# Patient Record
Sex: FEMALE | Race: WHITE | Employment: OTHER | URBAN - METROPOLITAN AREA
[De-identification: names, ages, dates, MRNs, and addresses within clinical notes are randomized per-mention and may not be internally consistent; named-entity substitution may affect disease eponyms.]

---

## 2018-03-12 ENCOUNTER — TELEPHONE (OUTPATIENT)
Dept: FAMILY MEDICINE CLINIC | Age: 83
End: 2018-03-12

## 2018-03-12 NOTE — TELEPHONE ENCOUNTER
LSW called patient regarding transportation issues. Patient reported she ended up asking a friend who is taking off work to take her to appt on 3/15/18 at [employer]. Patient stated [facility] had advised her to call back on 20th to request ride for 4/5/18 appt with Dr. Whitley Maguire and 4/20/18 with Dr. Roshan Rendon. LSW advised patient if unable to get [facility] transportation to call the offices where her appointments are and ask the staff to request Pauly Mnotero transportation with [employer]. LSW advised patient to call LSW with any future transportation issues and as needed.

## 2018-03-15 ENCOUNTER — HOSPITAL ENCOUNTER (OUTPATIENT)
Dept: CARDIOLOGY | Age: 83
Discharge: HOME | End: 2018-03-15
Payer: MEDICARE

## 2018-03-15 LAB
LV EF: 65 %
LVEF MODALITY: NORMAL

## 2018-03-15 PROCEDURE — 93306 TTE W/DOPPLER COMPLETE: CPT

## 2018-04-05 ENCOUNTER — TELEPHONE (OUTPATIENT)
Dept: FAMILY MEDICINE CLINIC | Age: 83
End: 2018-04-05

## 2018-04-05 ENCOUNTER — OFFICE VISIT (OUTPATIENT)
Dept: NON INVASIVE DIAGNOSTICS | Age: 83
End: 2018-04-05
Payer: MEDICARE

## 2018-04-05 VITALS
HEART RATE: 80 BPM | HEIGHT: 64 IN | SYSTOLIC BLOOD PRESSURE: 128 MMHG | BODY MASS INDEX: 30.56 KG/M2 | WEIGHT: 179 LBS | DIASTOLIC BLOOD PRESSURE: 60 MMHG | RESPIRATION RATE: 20 BRPM

## 2018-04-05 DIAGNOSIS — I44.2 COMPLETE HEART BLOCK (HCC): ICD-10-CM

## 2018-04-05 DIAGNOSIS — Z95.0 PACEMAKER: Primary | ICD-10-CM

## 2018-04-05 PROCEDURE — 99212 OFFICE O/P EST SF 10 MIN: CPT | Performed by: INTERNAL MEDICINE

## 2018-04-05 PROCEDURE — 1123F ACP DISCUSS/DSCN MKR DOCD: CPT | Performed by: INTERNAL MEDICINE

## 2018-04-05 PROCEDURE — 1090F PRES/ABSN URINE INCON ASSESS: CPT | Performed by: INTERNAL MEDICINE

## 2018-04-05 PROCEDURE — G8427 DOCREV CUR MEDS BY ELIG CLIN: HCPCS | Performed by: INTERNAL MEDICINE

## 2018-04-05 PROCEDURE — 93280 PM DEVICE PROGR EVAL DUAL: CPT | Performed by: INTERNAL MEDICINE

## 2018-04-05 PROCEDURE — 1036F TOBACCO NON-USER: CPT | Performed by: INTERNAL MEDICINE

## 2018-04-05 PROCEDURE — 4040F PNEUMOC VAC/ADMIN/RCVD: CPT | Performed by: INTERNAL MEDICINE

## 2018-04-05 PROCEDURE — G8417 CALC BMI ABV UP PARAM F/U: HCPCS | Performed by: INTERNAL MEDICINE

## 2018-04-05 PROCEDURE — G8399 PT W/DXA RESULTS DOCUMENT: HCPCS | Performed by: INTERNAL MEDICINE

## 2018-04-13 ENCOUNTER — HOSPITAL ENCOUNTER (OUTPATIENT)
Age: 83
Discharge: HOME | End: 2018-04-15
Payer: MEDICARE

## 2018-04-13 ENCOUNTER — OFFICE VISIT (OUTPATIENT)
Dept: FAMILY MEDICINE CLINIC | Age: 83
End: 2018-04-13
Payer: MEDICARE

## 2018-04-13 VITALS
TEMPERATURE: 97.6 F | HEART RATE: 83 BPM | SYSTOLIC BLOOD PRESSURE: 124 MMHG | WEIGHT: 171 LBS | BODY MASS INDEX: 29.19 KG/M2 | OXYGEN SATURATION: 96 % | RESPIRATION RATE: 18 BRPM | HEIGHT: 64 IN | DIASTOLIC BLOOD PRESSURE: 68 MMHG

## 2018-04-13 DIAGNOSIS — E11.22 TYPE 2 DIABETES MELLITUS WITH STAGE 3 CHRONIC KIDNEY DISEASE, WITHOUT LONG-TERM CURRENT USE OF INSULIN (HCC): ICD-10-CM

## 2018-04-13 DIAGNOSIS — N18.30 TYPE 2 DIABETES MELLITUS WITH STAGE 3 CHRONIC KIDNEY DISEASE, WITHOUT LONG-TERM CURRENT USE OF INSULIN (HCC): ICD-10-CM

## 2018-04-13 DIAGNOSIS — E03.9 HYPOTHYROIDISM, UNSPECIFIED TYPE: ICD-10-CM

## 2018-04-13 DIAGNOSIS — M79.89 LEG SWELLING: ICD-10-CM

## 2018-04-13 DIAGNOSIS — I10 ESSENTIAL HYPERTENSION: Chronic | ICD-10-CM

## 2018-04-13 DIAGNOSIS — M79.89 LEG SWELLING: Primary | ICD-10-CM

## 2018-04-13 LAB
ALBUMIN SERPL-MCNC: 4 G/DL (ref 3.5–5.2)
ALP BLD-CCNC: 79 U/L (ref 35–104)
ALT SERPL-CCNC: 11 U/L (ref 0–32)
ANION GAP SERPL CALCULATED.3IONS-SCNC: 21 MMOL/L (ref 7–16)
AST SERPL-CCNC: 25 U/L (ref 0–31)
BASOPHILS ABSOLUTE: 0.05 E9/L (ref 0–0.2)
BASOPHILS RELATIVE PERCENT: 0.6 % (ref 0–2)
BILIRUB SERPL-MCNC: 0.5 MG/DL (ref 0–1.2)
BUN BLDV-MCNC: 23 MG/DL (ref 8–23)
CALCIUM SERPL-MCNC: 10.1 MG/DL (ref 8.6–10.2)
CHLORIDE BLD-SCNC: 96 MMOL/L (ref 98–107)
CO2: 23 MMOL/L (ref 22–29)
CREAT SERPL-MCNC: 1 MG/DL (ref 0.5–1)
EOSINOPHILS ABSOLUTE: 0.2 E9/L (ref 0.05–0.5)
EOSINOPHILS RELATIVE PERCENT: 2.2 % (ref 0–6)
GFR AFRICAN AMERICAN: >60
GFR NON-AFRICAN AMERICAN: 53 ML/MIN/1.73
GLUCOSE BLD-MCNC: 70 MG/DL (ref 74–109)
HBA1C MFR BLD: 6.1 % (ref 4.8–5.9)
HCT VFR BLD CALC: 44.6 % (ref 34–48)
HEMOGLOBIN: 14 G/DL (ref 11.5–15.5)
IMMATURE GRANULOCYTES #: 0.02 E9/L
IMMATURE GRANULOCYTES %: 0.2 % (ref 0–5)
LYMPHOCYTES ABSOLUTE: 2.94 E9/L (ref 1.5–4)
LYMPHOCYTES RELATIVE PERCENT: 32.9 % (ref 20–42)
MAGNESIUM: 2.5 MG/DL (ref 1.6–2.6)
MCH RBC QN AUTO: 28.7 PG (ref 26–35)
MCHC RBC AUTO-ENTMCNC: 31.4 % (ref 32–34.5)
MCV RBC AUTO: 91.6 FL (ref 80–99.9)
MONOCYTES ABSOLUTE: 1.38 E9/L (ref 0.1–0.95)
MONOCYTES RELATIVE PERCENT: 15.4 % (ref 2–12)
NEUTROPHILS ABSOLUTE: 4.35 E9/L (ref 1.8–7.3)
NEUTROPHILS RELATIVE PERCENT: 48.7 % (ref 43–80)
PDW BLD-RTO: 15.8 FL (ref 11.5–15)
PLATELET # BLD: 408 E9/L (ref 130–450)
PMV BLD AUTO: 10.5 FL (ref 7–12)
POTASSIUM SERPL-SCNC: 4.5 MMOL/L (ref 3.5–5)
RBC # BLD: 4.87 E12/L (ref 3.5–5.5)
SODIUM BLD-SCNC: 140 MMOL/L (ref 132–146)
TOTAL PROTEIN: 8.3 G/DL (ref 6.4–8.3)
TSH SERPL DL<=0.05 MIU/L-ACNC: 1.39 UIU/ML (ref 0.27–4.2)
WBC # BLD: 8.9 E9/L (ref 4.5–11.5)

## 2018-04-13 PROCEDURE — 4040F PNEUMOC VAC/ADMIN/RCVD: CPT | Performed by: FAMILY MEDICINE

## 2018-04-13 PROCEDURE — 36415 COLL VENOUS BLD VENIPUNCTURE: CPT | Performed by: FAMILY MEDICINE

## 2018-04-13 PROCEDURE — 83735 ASSAY OF MAGNESIUM: CPT

## 2018-04-13 PROCEDURE — G8417 CALC BMI ABV UP PARAM F/U: HCPCS | Performed by: FAMILY MEDICINE

## 2018-04-13 PROCEDURE — 84443 ASSAY THYROID STIM HORMONE: CPT

## 2018-04-13 PROCEDURE — G8399 PT W/DXA RESULTS DOCUMENT: HCPCS | Performed by: FAMILY MEDICINE

## 2018-04-13 PROCEDURE — 1123F ACP DISCUSS/DSCN MKR DOCD: CPT | Performed by: FAMILY MEDICINE

## 2018-04-13 PROCEDURE — 1090F PRES/ABSN URINE INCON ASSESS: CPT | Performed by: FAMILY MEDICINE

## 2018-04-13 PROCEDURE — 99214 OFFICE O/P EST MOD 30 MIN: CPT | Performed by: FAMILY MEDICINE

## 2018-04-13 PROCEDURE — 85025 COMPLETE CBC W/AUTO DIFF WBC: CPT

## 2018-04-13 PROCEDURE — 1036F TOBACCO NON-USER: CPT | Performed by: FAMILY MEDICINE

## 2018-04-13 PROCEDURE — 83036 HEMOGLOBIN GLYCOSYLATED A1C: CPT

## 2018-04-13 PROCEDURE — G8427 DOCREV CUR MEDS BY ELIG CLIN: HCPCS | Performed by: FAMILY MEDICINE

## 2018-04-13 PROCEDURE — 80053 COMPREHEN METABOLIC PANEL: CPT

## 2018-04-13 RX ORDER — FUROSEMIDE 20 MG/1
20 TABLET ORAL DAILY
Qty: 60 TABLET | Refills: 1 | Status: CANCELLED | OUTPATIENT
Start: 2018-04-13

## 2018-04-13 RX ORDER — CHLORTHALIDONE 25 MG/1
25 TABLET ORAL DAILY
Qty: 30 TABLET | Refills: 0 | Status: SHIPPED | OUTPATIENT
Start: 2018-04-13 | End: 2018-05-25 | Stop reason: SDUPTHER

## 2018-04-14 ASSESSMENT — ENCOUNTER SYMPTOMS
NAUSEA: 0
HEARTBURN: 0
WHEEZING: 0
COUGH: 0

## 2018-04-27 ENCOUNTER — OFFICE VISIT (OUTPATIENT)
Dept: FAMILY MEDICINE CLINIC | Age: 83
End: 2018-04-27
Payer: MEDICARE

## 2018-04-27 ENCOUNTER — HOSPITAL ENCOUNTER (OUTPATIENT)
Age: 83
Discharge: HOME | End: 2018-04-29
Payer: MEDICARE

## 2018-04-27 VITALS
HEART RATE: 76 BPM | BODY MASS INDEX: 29.53 KG/M2 | HEIGHT: 64 IN | OXYGEN SATURATION: 97 % | WEIGHT: 173 LBS | DIASTOLIC BLOOD PRESSURE: 64 MMHG | RESPIRATION RATE: 16 BRPM | SYSTOLIC BLOOD PRESSURE: 112 MMHG | TEMPERATURE: 97.6 F

## 2018-04-27 DIAGNOSIS — I10 ESSENTIAL HYPERTENSION: Chronic | ICD-10-CM

## 2018-04-27 DIAGNOSIS — I10 ESSENTIAL HYPERTENSION: Primary | Chronic | ICD-10-CM

## 2018-04-27 DIAGNOSIS — R53.1 GENERALIZED WEAKNESS: ICD-10-CM

## 2018-04-27 DIAGNOSIS — Z76.0 MEDICATION REFILL: ICD-10-CM

## 2018-04-27 DIAGNOSIS — E11.9 DIET-CONTROLLED DIABETES MELLITUS (HCC): ICD-10-CM

## 2018-04-27 PROCEDURE — 1123F ACP DISCUSS/DSCN MKR DOCD: CPT | Performed by: FAMILY MEDICINE

## 2018-04-27 PROCEDURE — G8399 PT W/DXA RESULTS DOCUMENT: HCPCS | Performed by: FAMILY MEDICINE

## 2018-04-27 PROCEDURE — 80053 COMPREHEN METABOLIC PANEL: CPT

## 2018-04-27 PROCEDURE — 4040F PNEUMOC VAC/ADMIN/RCVD: CPT | Performed by: FAMILY MEDICINE

## 2018-04-27 PROCEDURE — 1036F TOBACCO NON-USER: CPT | Performed by: FAMILY MEDICINE

## 2018-04-27 PROCEDURE — G8427 DOCREV CUR MEDS BY ELIG CLIN: HCPCS | Performed by: FAMILY MEDICINE

## 2018-04-27 PROCEDURE — 1090F PRES/ABSN URINE INCON ASSESS: CPT | Performed by: FAMILY MEDICINE

## 2018-04-27 PROCEDURE — G8417 CALC BMI ABV UP PARAM F/U: HCPCS | Performed by: FAMILY MEDICINE

## 2018-04-27 PROCEDURE — 99213 OFFICE O/P EST LOW 20 MIN: CPT | Performed by: FAMILY MEDICINE

## 2018-04-27 RX ORDER — AMLODIPINE BESYLATE 10 MG/1
5 TABLET ORAL EVERY EVENING
Status: SHIPPED | COMMUNITY
Start: 2018-04-27 | End: 2018-06-08 | Stop reason: SDUPTHER

## 2018-04-27 RX ORDER — ISOSORBIDE MONONITRATE 30 MG/1
30 TABLET, EXTENDED RELEASE ORAL DAILY
Qty: 90 TABLET | Refills: 0 | Status: SHIPPED | OUTPATIENT
Start: 2018-04-27 | End: 2018-07-23 | Stop reason: SDUPTHER

## 2018-04-27 ASSESSMENT — ENCOUNTER SYMPTOMS
BACK PAIN: 1
COUGH: 0
WHEEZING: 0

## 2018-04-28 LAB
ALBUMIN SERPL-MCNC: 3.9 G/DL (ref 3.5–5.2)
ALP BLD-CCNC: 79 U/L (ref 35–104)
ALT SERPL-CCNC: 16 U/L (ref 0–32)
ANION GAP SERPL CALCULATED.3IONS-SCNC: 18 MMOL/L (ref 7–16)
AST SERPL-CCNC: 25 U/L (ref 0–31)
BILIRUB SERPL-MCNC: 0.4 MG/DL (ref 0–1.2)
BUN BLDV-MCNC: 27 MG/DL (ref 8–23)
CALCIUM SERPL-MCNC: 9.8 MG/DL (ref 8.6–10.2)
CHLORIDE BLD-SCNC: 87 MMOL/L (ref 98–107)
CO2: 28 MMOL/L (ref 22–29)
CREAT SERPL-MCNC: 1 MG/DL (ref 0.5–1)
GFR AFRICAN AMERICAN: >60
GFR NON-AFRICAN AMERICAN: 53 ML/MIN/1.73
GLUCOSE BLD-MCNC: 90 MG/DL (ref 74–109)
POTASSIUM SERPL-SCNC: 3.8 MMOL/L (ref 3.5–5)
SODIUM BLD-SCNC: 133 MMOL/L (ref 132–146)
TOTAL PROTEIN: 8.3 G/DL (ref 6.4–8.3)

## 2018-05-11 RX ORDER — OXYBUTYNIN CHLORIDE 5 MG/1
5 TABLET ORAL NIGHTLY
Qty: 30 TABLET | Refills: 2 | Status: SHIPPED | OUTPATIENT
Start: 2018-05-11 | End: 2018-06-13 | Stop reason: SDUPTHER

## 2018-05-20 ENCOUNTER — HOSPITAL ENCOUNTER (OUTPATIENT)
Age: 83
Setting detail: OBSERVATION
Discharge: HOME | End: 2018-05-21
Attending: EMERGENCY MEDICINE | Admitting: INTERNAL MEDICINE
Payer: MEDICARE

## 2018-05-20 ENCOUNTER — APPOINTMENT (OUTPATIENT)
Dept: GENERAL RADIOLOGY | Age: 83
End: 2018-05-20
Payer: MEDICARE

## 2018-05-20 DIAGNOSIS — M79.602 LEFT ARM PAIN: Primary | ICD-10-CM

## 2018-05-20 LAB
ALBUMIN SERPL-MCNC: 3.8 G/DL (ref 3.5–5.2)
ALP BLD-CCNC: 83 U/L (ref 35–104)
ALT SERPL-CCNC: 16 U/L (ref 0–32)
ANION GAP SERPL CALCULATED.3IONS-SCNC: 15 MMOL/L (ref 7–16)
APTT: 37.8 SEC (ref 24.5–35.1)
AST SERPL-CCNC: 23 U/L (ref 0–31)
BACTERIA: ABNORMAL /HPF
BASOPHILS ABSOLUTE: 0.06 E9/L (ref 0–0.2)
BASOPHILS RELATIVE PERCENT: 0.8 % (ref 0–2)
BILIRUB SERPL-MCNC: 0.5 MG/DL (ref 0–1.2)
BILIRUBIN URINE: NEGATIVE
BLOOD, URINE: NEGATIVE
BUN BLDV-MCNC: 17 MG/DL (ref 8–23)
CALCIUM SERPL-MCNC: 9.4 MG/DL (ref 8.6–10.2)
CHLORIDE BLD-SCNC: 92 MMOL/L (ref 98–107)
CLARITY: CLEAR
CO2: 27 MMOL/L (ref 22–29)
COLOR: YELLOW
CREAT SERPL-MCNC: 0.8 MG/DL (ref 0.5–1)
EKG ATRIAL RATE: 75 BPM
EKG P AXIS: 39 DEGREES
EKG P-R INTERVAL: 184 MS
EKG Q-T INTERVAL: 462 MS
EKG QRS DURATION: 162 MS
EKG QTC CALCULATION (BAZETT): 515 MS
EKG R AXIS: -82 DEGREES
EKG T AXIS: 74 DEGREES
EKG VENTRICULAR RATE: 75 BPM
EOSINOPHILS ABSOLUTE: 0.11 E9/L (ref 0.05–0.5)
EOSINOPHILS RELATIVE PERCENT: 1.5 % (ref 0–6)
GFR AFRICAN AMERICAN: >60
GFR NON-AFRICAN AMERICAN: >60 ML/MIN/1.73
GLUCOSE BLD-MCNC: 94 MG/DL (ref 74–109)
GLUCOSE URINE: NEGATIVE MG/DL
HCT VFR BLD CALC: 41.2 % (ref 34–48)
HEMOGLOBIN: 13.8 G/DL (ref 11.5–15.5)
IMMATURE GRANULOCYTES #: 0.02 E9/L
IMMATURE GRANULOCYTES %: 0.3 % (ref 0–5)
INR BLD: 1.1
KETONES, URINE: NEGATIVE MG/DL
LEUKOCYTE ESTERASE, URINE: ABNORMAL
LYMPHOCYTES ABSOLUTE: 2.53 E9/L (ref 1.5–4)
LYMPHOCYTES RELATIVE PERCENT: 35.5 % (ref 20–42)
MCH RBC QN AUTO: 29 PG (ref 26–35)
MCHC RBC AUTO-ENTMCNC: 33.5 % (ref 32–34.5)
MCV RBC AUTO: 86.6 FL (ref 80–99.9)
MONOCYTES ABSOLUTE: 0.96 E9/L (ref 0.1–0.95)
MONOCYTES RELATIVE PERCENT: 13.5 % (ref 2–12)
NEUTROPHILS ABSOLUTE: 3.44 E9/L (ref 1.8–7.3)
NEUTROPHILS RELATIVE PERCENT: 48.4 % (ref 43–80)
NITRITE, URINE: NEGATIVE
PDW BLD-RTO: 14.1 FL (ref 11.5–15)
PH UA: 7.5 (ref 5–9)
PLATELET # BLD: 344 E9/L (ref 130–450)
PMV BLD AUTO: 9 FL (ref 7–12)
POTASSIUM SERPL-SCNC: 4 MMOL/L (ref 3.5–5)
PROTEIN UA: NEGATIVE MG/DL
PROTHROMBIN TIME: 12.1 SEC (ref 9.3–12.4)
RBC # BLD: 4.76 E12/L (ref 3.5–5.5)
RBC UA: ABNORMAL /HPF (ref 0–2)
SODIUM BLD-SCNC: 134 MMOL/L (ref 132–146)
SPECIFIC GRAVITY UA: <=1.005 (ref 1–1.03)
TOTAL CK: 84 U/L (ref 20–180)
TOTAL PROTEIN: 7.8 G/DL (ref 6.4–8.3)
TROPONIN: <0.01 NG/ML (ref 0–0.03)
UROBILINOGEN, URINE: 0.2 E.U./DL
WBC # BLD: 7.1 E9/L (ref 4.5–11.5)
WBC UA: ABNORMAL /HPF (ref 0–5)

## 2018-05-20 PROCEDURE — 2580000003 HC RX 258: Performed by: INTERNAL MEDICINE

## 2018-05-20 PROCEDURE — 6360000002 HC RX W HCPCS: Performed by: INTERNAL MEDICINE

## 2018-05-20 PROCEDURE — 93005 ELECTROCARDIOGRAM TRACING: CPT | Performed by: EMERGENCY MEDICINE

## 2018-05-20 PROCEDURE — 85025 COMPLETE CBC W/AUTO DIFF WBC: CPT

## 2018-05-20 PROCEDURE — 99285 EMERGENCY DEPT VISIT HI MDM: CPT

## 2018-05-20 PROCEDURE — 85730 THROMBOPLASTIN TIME PARTIAL: CPT

## 2018-05-20 PROCEDURE — 80053 COMPREHEN METABOLIC PANEL: CPT

## 2018-05-20 PROCEDURE — 81001 URINALYSIS AUTO W/SCOPE: CPT

## 2018-05-20 PROCEDURE — 85610 PROTHROMBIN TIME: CPT

## 2018-05-20 PROCEDURE — G0378 HOSPITAL OBSERVATION PER HR: HCPCS

## 2018-05-20 PROCEDURE — 36415 COLL VENOUS BLD VENIPUNCTURE: CPT

## 2018-05-20 PROCEDURE — 71045 X-RAY EXAM CHEST 1 VIEW: CPT

## 2018-05-20 PROCEDURE — 6370000000 HC RX 637 (ALT 250 FOR IP): Performed by: INTERNAL MEDICINE

## 2018-05-20 PROCEDURE — 84484 ASSAY OF TROPONIN QUANT: CPT

## 2018-05-20 PROCEDURE — 96372 THER/PROPH/DIAG INJ SC/IM: CPT

## 2018-05-20 PROCEDURE — 82550 ASSAY OF CK (CPK): CPT

## 2018-05-20 RX ORDER — ONDANSETRON 2 MG/ML
4 INJECTION INTRAMUSCULAR; INTRAVENOUS EVERY 6 HOURS PRN
Status: DISCONTINUED | OUTPATIENT
Start: 2018-05-20 | End: 2018-05-21 | Stop reason: HOSPADM

## 2018-05-20 RX ORDER — ISOSORBIDE MONONITRATE 30 MG/1
30 TABLET, EXTENDED RELEASE ORAL DAILY
Status: DISCONTINUED | OUTPATIENT
Start: 2018-05-21 | End: 2018-05-21 | Stop reason: HOSPADM

## 2018-05-20 RX ORDER — CHOLECALCIFEROL (VITAMIN D3) 50 MCG
2000 TABLET ORAL DAILY
Status: DISCONTINUED | OUTPATIENT
Start: 2018-05-20 | End: 2018-05-21 | Stop reason: HOSPADM

## 2018-05-20 RX ORDER — AMLODIPINE BESYLATE 5 MG/1
5 TABLET ORAL EVERY EVENING
Status: DISCONTINUED | OUTPATIENT
Start: 2018-05-20 | End: 2018-05-21 | Stop reason: HOSPADM

## 2018-05-20 RX ORDER — CHLORTHALIDONE 25 MG/1
25 TABLET ORAL DAILY
Status: DISCONTINUED | OUTPATIENT
Start: 2018-05-20 | End: 2018-05-21 | Stop reason: HOSPADM

## 2018-05-20 RX ORDER — SIMVASTATIN 10 MG
10 TABLET ORAL NIGHTLY
Status: DISCONTINUED | OUTPATIENT
Start: 2018-05-20 | End: 2018-05-21 | Stop reason: HOSPADM

## 2018-05-20 RX ORDER — SODIUM CHLORIDE 0.9 % (FLUSH) 0.9 %
10 SYRINGE (ML) INJECTION EVERY 12 HOURS SCHEDULED
Status: DISCONTINUED | OUTPATIENT
Start: 2018-05-20 | End: 2018-05-21 | Stop reason: HOSPADM

## 2018-05-20 RX ORDER — LEVOTHYROXINE SODIUM 0.1 MG/1
100 TABLET ORAL DAILY
Status: DISCONTINUED | OUTPATIENT
Start: 2018-05-20 | End: 2018-05-21 | Stop reason: HOSPADM

## 2018-05-20 RX ORDER — NITROGLYCERIN 0.4 MG/1
0.4 TABLET SUBLINGUAL EVERY 5 MIN PRN
Status: DISCONTINUED | OUTPATIENT
Start: 2018-05-20 | End: 2018-05-21 | Stop reason: HOSPADM

## 2018-05-20 RX ORDER — SODIUM CHLORIDE 0.9 % (FLUSH) 0.9 %
10 SYRINGE (ML) INJECTION PRN
Status: DISCONTINUED | OUTPATIENT
Start: 2018-05-20 | End: 2018-05-21 | Stop reason: HOSPADM

## 2018-05-20 RX ORDER — OXYBUTYNIN CHLORIDE 5 MG/1
5 TABLET ORAL NIGHTLY
Status: DISCONTINUED | OUTPATIENT
Start: 2018-05-20 | End: 2018-05-21 | Stop reason: HOSPADM

## 2018-05-20 RX ORDER — FOLIC ACID 1 MG/1
500 TABLET ORAL DAILY
Status: DISCONTINUED | OUTPATIENT
Start: 2018-05-20 | End: 2018-05-21 | Stop reason: HOSPADM

## 2018-05-20 RX ORDER — ACETAMINOPHEN 325 MG/1
650 TABLET ORAL EVERY 4 HOURS PRN
Status: DISCONTINUED | OUTPATIENT
Start: 2018-05-20 | End: 2018-05-21 | Stop reason: HOSPADM

## 2018-05-20 RX ORDER — LANOLIN ALCOHOL/MO/W.PET/CERES
1000 CREAM (GRAM) TOPICAL DAILY
Status: DISCONTINUED | OUTPATIENT
Start: 2018-05-20 | End: 2018-05-21 | Stop reason: HOSPADM

## 2018-05-20 RX ORDER — ASPIRIN 81 MG/1
81 TABLET ORAL DAILY
Status: DISCONTINUED | OUTPATIENT
Start: 2018-05-20 | End: 2018-05-21 | Stop reason: HOSPADM

## 2018-05-20 RX ADMIN — Medication 10 ML: at 20:46

## 2018-05-20 RX ADMIN — OXYBUTYNIN CHLORIDE 5 MG: 5 TABLET ORAL at 20:45

## 2018-05-20 RX ADMIN — CYANOCOBALAMIN TAB 1000 MCG 1000 MCG: 1000 TAB at 19:00

## 2018-05-20 RX ADMIN — CHOLECALCIFEROL TAB 50 MCG (2000 UNIT) 2000 UNITS: 50 TAB at 19:00

## 2018-05-20 RX ADMIN — FOLIC ACID 500 MCG: 1 TABLET ORAL at 20:45

## 2018-05-20 RX ADMIN — ASPIRIN 81 MG: 81 TABLET ORAL at 19:00

## 2018-05-20 RX ADMIN — SIMVASTATIN 10 MG: 10 TABLET, FILM COATED ORAL at 20:44

## 2018-05-20 RX ADMIN — AMLODIPINE BESYLATE 5 MG: 5 TABLET ORAL at 20:45

## 2018-05-20 RX ADMIN — ENOXAPARIN SODIUM 40 MG: 40 INJECTION SUBCUTANEOUS at 19:00

## 2018-05-20 ASSESSMENT — PAIN SCALES - GENERAL: PAINLEVEL_OUTOF10: 0

## 2018-05-21 ENCOUNTER — APPOINTMENT (OUTPATIENT)
Dept: NUCLEAR MEDICINE | Age: 83
End: 2018-05-21
Payer: MEDICARE

## 2018-05-21 VITALS
OXYGEN SATURATION: 95 % | WEIGHT: 171 LBS | HEART RATE: 82 BPM | HEIGHT: 64 IN | TEMPERATURE: 97.6 F | DIASTOLIC BLOOD PRESSURE: 79 MMHG | SYSTOLIC BLOOD PRESSURE: 159 MMHG | BODY MASS INDEX: 29.19 KG/M2 | RESPIRATION RATE: 18 BRPM

## 2018-05-21 PROBLEM — R07.9 CHEST PAIN: Status: RESOLVED | Noted: 2018-05-21 | Resolved: 2018-05-21

## 2018-05-21 PROBLEM — E03.9 ACQUIRED HYPOTHYROIDISM: Chronic | Status: ACTIVE | Noted: 2018-05-21

## 2018-05-21 PROBLEM — R07.9 CHEST PAIN: Status: ACTIVE | Noted: 2018-05-21

## 2018-05-21 PROBLEM — E78.5 HYPERLIPIDEMIA LDL GOAL <100: Chronic | Status: ACTIVE | Noted: 2018-05-21

## 2018-05-21 PROBLEM — M79.602 LEFT ARM PAIN: Status: RESOLVED | Noted: 2018-05-20 | Resolved: 2018-05-21

## 2018-05-21 LAB
APTT: 36 SEC (ref 24.5–35.1)
BASOPHILS ABSOLUTE: 0.06 E9/L (ref 0–0.2)
BASOPHILS RELATIVE PERCENT: 0.7 % (ref 0–2)
CHOLESTEROL, TOTAL: 124 MG/DL (ref 0–199)
EOSINOPHILS ABSOLUTE: 0.21 E9/L (ref 0.05–0.5)
EOSINOPHILS RELATIVE PERCENT: 2.5 % (ref 0–6)
HCT VFR BLD CALC: 36.6 % (ref 34–48)
HDLC SERPL-MCNC: 49 MG/DL
HEMOGLOBIN: 12.3 G/DL (ref 11.5–15.5)
IMMATURE GRANULOCYTES #: 0.02 E9/L
IMMATURE GRANULOCYTES %: 0.2 % (ref 0–5)
INR BLD: 1.1
LDL CHOLESTEROL CALCULATED: 60 MG/DL (ref 0–99)
LV EF: 71 %
LVEF MODALITY: NORMAL
LYMPHOCYTES ABSOLUTE: 3.64 E9/L (ref 1.5–4)
LYMPHOCYTES RELATIVE PERCENT: 43.5 % (ref 20–42)
MCH RBC QN AUTO: 28.9 PG (ref 26–35)
MCHC RBC AUTO-ENTMCNC: 33.6 % (ref 32–34.5)
MCV RBC AUTO: 85.9 FL (ref 80–99.9)
MONOCYTES ABSOLUTE: 1.01 E9/L (ref 0.1–0.95)
MONOCYTES RELATIVE PERCENT: 12.1 % (ref 2–12)
NEUTROPHILS ABSOLUTE: 3.42 E9/L (ref 1.8–7.3)
NEUTROPHILS RELATIVE PERCENT: 41 % (ref 43–80)
PDW BLD-RTO: 13.9 FL (ref 11.5–15)
PLATELET # BLD: 334 E9/L (ref 130–450)
PMV BLD AUTO: 9.5 FL (ref 7–12)
PROTHROMBIN TIME: 13 SEC (ref 9.3–12.4)
RBC # BLD: 4.26 E12/L (ref 3.5–5.5)
TRIGL SERPL-MCNC: 76 MG/DL (ref 0–149)
VLDLC SERPL CALC-MCNC: 15 MG/DL
WBC # BLD: 8.4 E9/L (ref 4.5–11.5)

## 2018-05-21 PROCEDURE — 6360000002 HC RX W HCPCS: Performed by: INTERNAL MEDICINE

## 2018-05-21 PROCEDURE — 99214 OFFICE O/P EST MOD 30 MIN: CPT | Performed by: INTERNAL MEDICINE

## 2018-05-21 PROCEDURE — 93017 CV STRESS TEST TRACING ONLY: CPT

## 2018-05-21 PROCEDURE — G0378 HOSPITAL OBSERVATION PER HR: HCPCS

## 2018-05-21 PROCEDURE — 36415 COLL VENOUS BLD VENIPUNCTURE: CPT

## 2018-05-21 PROCEDURE — 6370000000 HC RX 637 (ALT 250 FOR IP): Performed by: INTERNAL MEDICINE

## 2018-05-21 PROCEDURE — 2580000003 HC RX 258: Performed by: INTERNAL MEDICINE

## 2018-05-21 PROCEDURE — APPSS60 APP SPLIT SHARED TIME 46-60 MINUTES: Performed by: NURSE PRACTITIONER

## 2018-05-21 PROCEDURE — 85025 COMPLETE CBC W/AUTO DIFF WBC: CPT

## 2018-05-21 PROCEDURE — 80061 LIPID PANEL: CPT

## 2018-05-21 PROCEDURE — 3430000000 HC RX DIAGNOSTIC RADIOPHARMACEUTICAL: Performed by: RADIOLOGY

## 2018-05-21 PROCEDURE — A9500 TC99M SESTAMIBI: HCPCS | Performed by: RADIOLOGY

## 2018-05-21 PROCEDURE — 85730 THROMBOPLASTIN TIME PARTIAL: CPT

## 2018-05-21 PROCEDURE — 78452 HT MUSCLE IMAGE SPECT MULT: CPT

## 2018-05-21 PROCEDURE — 85610 PROTHROMBIN TIME: CPT

## 2018-05-21 RX ORDER — SODIUM CHLORIDE 0.9 % (FLUSH) 0.9 %
10 SYRINGE (ML) INJECTION PRN
Status: DISCONTINUED | OUTPATIENT
Start: 2018-05-21 | End: 2018-05-21 | Stop reason: SDUPTHER

## 2018-05-21 RX ADMIN — Medication 10 ML: at 09:09

## 2018-05-21 RX ADMIN — Medication 32.4 MILLICURIE: at 10:20

## 2018-05-21 RX ADMIN — ISOSORBIDE MONONITRATE 30 MG: 30 TABLET ORAL at 14:15

## 2018-05-21 RX ADMIN — CHLORTHALIDONE 25 MG: 25 TABLET ORAL at 14:15

## 2018-05-21 RX ADMIN — REGADENOSON 0.4 MG: 0.08 INJECTION, SOLUTION INTRAVENOUS at 11:21

## 2018-05-21 RX ADMIN — Medication 10 MILLICURIE: at 10:32

## 2018-05-21 RX ADMIN — ASPIRIN 81 MG: 81 TABLET ORAL at 14:15

## 2018-05-21 ASSESSMENT — PAIN SCALES - GENERAL
PAINLEVEL_OUTOF10: 0

## 2018-05-25 ENCOUNTER — TELEPHONE (OUTPATIENT)
Dept: FAMILY MEDICINE CLINIC | Age: 83
End: 2018-05-25

## 2018-05-25 DIAGNOSIS — M79.89 LEG SWELLING: ICD-10-CM

## 2018-05-25 RX ORDER — CHLORTHALIDONE 25 MG/1
25 TABLET ORAL DAILY
Qty: 30 TABLET | Refills: 2 | Status: SHIPPED | OUTPATIENT
Start: 2018-05-25 | End: 2018-09-28 | Stop reason: SDUPTHER

## 2018-05-25 NOTE — TELEPHONE ENCOUNTER
LSW received phone call from patient, reported pharmacy wanted to know if patient needed a refill of chlorthalidone 25mg. Patient reportedly tried calling PCP office but automated message said please allow 48 hours for prescription refills. LSW will call office. Also discussed patient possibly being enrolled in [employer] [facility] if there are anymore vouchers for patients not receiving food assistance/SNAP. Patient unsure if niece will be able to go to [employer] for her. LSW will send niece copy of program and will check with  regarding any available vouchers. Patient stated she is very pleased with change in PCP and the office staff.

## 2018-06-08 ENCOUNTER — OFFICE VISIT (OUTPATIENT)
Dept: FAMILY MEDICINE CLINIC | Age: 83
End: 2018-06-08
Payer: MEDICARE

## 2018-06-08 VITALS
RESPIRATION RATE: 16 BRPM | OXYGEN SATURATION: 99 % | BODY MASS INDEX: 30.56 KG/M2 | DIASTOLIC BLOOD PRESSURE: 60 MMHG | TEMPERATURE: 98.2 F | SYSTOLIC BLOOD PRESSURE: 112 MMHG | HEIGHT: 64 IN | WEIGHT: 179 LBS | HEART RATE: 79 BPM

## 2018-06-08 DIAGNOSIS — Z76.0 MEDICATION REFILL: ICD-10-CM

## 2018-06-08 DIAGNOSIS — R07.9 CHEST PAIN, UNSPECIFIED TYPE: ICD-10-CM

## 2018-06-08 DIAGNOSIS — M25.512 LEFT SHOULDER PAIN, UNSPECIFIED CHRONICITY: ICD-10-CM

## 2018-06-08 PROCEDURE — 1111F DSCHRG MED/CURRENT MED MERGE: CPT | Performed by: FAMILY MEDICINE

## 2018-06-08 PROCEDURE — 4040F PNEUMOC VAC/ADMIN/RCVD: CPT | Performed by: FAMILY MEDICINE

## 2018-06-08 PROCEDURE — 1036F TOBACCO NON-USER: CPT | Performed by: FAMILY MEDICINE

## 2018-06-08 PROCEDURE — 1090F PRES/ABSN URINE INCON ASSESS: CPT | Performed by: FAMILY MEDICINE

## 2018-06-08 PROCEDURE — G8427 DOCREV CUR MEDS BY ELIG CLIN: HCPCS | Performed by: FAMILY MEDICINE

## 2018-06-08 PROCEDURE — G8399 PT W/DXA RESULTS DOCUMENT: HCPCS | Performed by: FAMILY MEDICINE

## 2018-06-08 PROCEDURE — G8417 CALC BMI ABV UP PARAM F/U: HCPCS | Performed by: FAMILY MEDICINE

## 2018-06-08 PROCEDURE — 1123F ACP DISCUSS/DSCN MKR DOCD: CPT | Performed by: FAMILY MEDICINE

## 2018-06-08 PROCEDURE — 99214 OFFICE O/P EST MOD 30 MIN: CPT | Performed by: FAMILY MEDICINE

## 2018-06-08 RX ORDER — SIMVASTATIN 10 MG
10 TABLET ORAL NIGHTLY
Qty: 30 TABLET | Refills: 3 | Status: SHIPPED | OUTPATIENT
Start: 2018-06-08 | End: 2018-09-28 | Stop reason: SDUPTHER

## 2018-06-08 RX ORDER — AMLODIPINE BESYLATE 5 MG/1
5 TABLET ORAL EVERY EVENING
Qty: 90 TABLET | Refills: 0 | Status: SHIPPED | OUTPATIENT
Start: 2018-06-08 | End: 2018-09-28 | Stop reason: SDUPTHER

## 2018-06-14 RX ORDER — OXYBUTYNIN CHLORIDE 5 MG/1
5 TABLET ORAL NIGHTLY
Qty: 90 TABLET | Refills: 1 | Status: SHIPPED | OUTPATIENT
Start: 2018-06-14 | End: 2018-09-28 | Stop reason: SDUPTHER

## 2018-06-30 ENCOUNTER — APPOINTMENT (OUTPATIENT)
Dept: CT IMAGING | Age: 83
End: 2018-06-30
Payer: MEDICARE

## 2018-06-30 ENCOUNTER — APPOINTMENT (OUTPATIENT)
Dept: GENERAL RADIOLOGY | Age: 83
End: 2018-06-30
Payer: MEDICARE

## 2018-06-30 ENCOUNTER — HOSPITAL ENCOUNTER (OUTPATIENT)
Age: 83
Setting detail: OBSERVATION
Discharge: NURSING, REHAB OR LONG-TERM CARE | End: 2018-07-03
Attending: EMERGENCY MEDICINE | Admitting: INTERNAL MEDICINE
Payer: MEDICARE

## 2018-06-30 DIAGNOSIS — R56.9 SEIZURE-LIKE ACTIVITY (HCC): ICD-10-CM

## 2018-06-30 DIAGNOSIS — R55 SYNCOPE AND COLLAPSE: Primary | ICD-10-CM

## 2018-06-30 PROBLEM — E87.1 HYPONATREMIA: Status: ACTIVE | Noted: 2018-06-30

## 2018-06-30 LAB
ALBUMIN SERPL-MCNC: 3.9 G/DL (ref 3.5–5.2)
ALP BLD-CCNC: 82 U/L (ref 35–104)
ALT SERPL-CCNC: 19 U/L (ref 0–32)
AMORPHOUS: ABNORMAL
ANION GAP SERPL CALCULATED.3IONS-SCNC: 12 MMOL/L (ref 7–16)
AST SERPL-CCNC: 31 U/L (ref 0–31)
BACTERIA: ABNORMAL /HPF
BASOPHILS ABSOLUTE: 0.04 E9/L (ref 0–0.2)
BASOPHILS RELATIVE PERCENT: 0.5 % (ref 0–2)
BILIRUB SERPL-MCNC: 0.5 MG/DL (ref 0–1.2)
BILIRUBIN URINE: NEGATIVE
BLOOD, URINE: NEGATIVE
BUN BLDV-MCNC: 18 MG/DL (ref 8–23)
CALCIUM SERPL-MCNC: 9.9 MG/DL (ref 8.6–10.2)
CHLORIDE BLD-SCNC: 86 MMOL/L (ref 98–107)
CHP ED QC CHECK: YES
CLARITY: ABNORMAL
CO2: 28 MMOL/L (ref 22–29)
COLOR: YELLOW
CREAT SERPL-MCNC: 0.9 MG/DL (ref 0.5–1)
EKG ATRIAL RATE: 78 BPM
EKG P AXIS: 49 DEGREES
EKG P-R INTERVAL: 180 MS
EKG Q-T INTERVAL: 460 MS
EKG QRS DURATION: 164 MS
EKG QTC CALCULATION (BAZETT): 524 MS
EKG R AXIS: -83 DEGREES
EKG T AXIS: 79 DEGREES
EKG VENTRICULAR RATE: 78 BPM
EOSINOPHILS ABSOLUTE: 0.14 E9/L (ref 0.05–0.5)
EOSINOPHILS RELATIVE PERCENT: 1.9 % (ref 0–6)
GFR AFRICAN AMERICAN: >60
GFR NON-AFRICAN AMERICAN: 59 ML/MIN/1.73
GLUCOSE BLD-MCNC: 100 MG/DL (ref 74–109)
GLUCOSE BLD-MCNC: 91 MG/DL
GLUCOSE URINE: NEGATIVE MG/DL
HCT VFR BLD CALC: 38.8 % (ref 34–48)
HEMOGLOBIN: 13.6 G/DL (ref 11.5–15.5)
IMMATURE GRANULOCYTES #: 0.03 E9/L
IMMATURE GRANULOCYTES %: 0.4 % (ref 0–5)
KETONES, URINE: NEGATIVE MG/DL
LACTIC ACID: 1.1 MMOL/L (ref 0.5–2.2)
LEUKOCYTE ESTERASE, URINE: ABNORMAL
LYMPHOCYTES ABSOLUTE: 1.98 E9/L (ref 1.5–4)
LYMPHOCYTES RELATIVE PERCENT: 26.5 % (ref 20–42)
MCH RBC QN AUTO: 29.6 PG (ref 26–35)
MCHC RBC AUTO-ENTMCNC: 35.1 % (ref 32–34.5)
MCV RBC AUTO: 84.3 FL (ref 80–99.9)
METER GLUCOSE: 126 MG/DL (ref 70–110)
METER GLUCOSE: 141 MG/DL (ref 70–110)
METER GLUCOSE: 91 MG/DL (ref 70–110)
MONOCYTES ABSOLUTE: 1.08 E9/L (ref 0.1–0.95)
MONOCYTES RELATIVE PERCENT: 14.5 % (ref 2–12)
NEUTROPHILS ABSOLUTE: 4.19 E9/L (ref 1.8–7.3)
NEUTROPHILS RELATIVE PERCENT: 56.2 % (ref 43–80)
NITRITE, URINE: NEGATIVE
PDW BLD-RTO: 14 FL (ref 11.5–15)
PH UA: 7.5 (ref 5–9)
PLATELET # BLD: 354 E9/L (ref 130–450)
PMV BLD AUTO: 9.6 FL (ref 7–12)
POTASSIUM SERPL-SCNC: 3.6 MMOL/L (ref 3.5–5)
PRO-BNP: 1035 PG/ML (ref 0–450)
PROLACTIN: 34.14 NG/ML
PROTEIN UA: NEGATIVE MG/DL
RBC # BLD: 4.6 E12/L (ref 3.5–5.5)
RBC UA: ABNORMAL /HPF (ref 0–2)
RENAL EPITHELIAL, UA: ABNORMAL /HPF
SODIUM BLD-SCNC: 126 MMOL/L (ref 132–146)
SPECIFIC GRAVITY UA: 1.01 (ref 1–1.03)
TOTAL PROTEIN: 8.1 G/DL (ref 6.4–8.3)
TROPONIN: <0.01 NG/ML (ref 0–0.03)
TROPONIN: <0.01 NG/ML (ref 0–0.03)
UROBILINOGEN, URINE: 0.2 E.U./DL
WBC # BLD: 7.5 E9/L (ref 4.5–11.5)
WBC UA: ABNORMAL /HPF (ref 0–5)

## 2018-06-30 PROCEDURE — 81001 URINALYSIS AUTO W/SCOPE: CPT

## 2018-06-30 PROCEDURE — 85025 COMPLETE CBC W/AUTO DIFF WBC: CPT

## 2018-06-30 PROCEDURE — 6360000002 HC RX W HCPCS: Performed by: INTERNAL MEDICINE

## 2018-06-30 PROCEDURE — 71045 X-RAY EXAM CHEST 1 VIEW: CPT

## 2018-06-30 PROCEDURE — 80053 COMPREHEN METABOLIC PANEL: CPT

## 2018-06-30 PROCEDURE — G0378 HOSPITAL OBSERVATION PER HR: HCPCS

## 2018-06-30 PROCEDURE — 70450 CT HEAD/BRAIN W/O DYE: CPT

## 2018-06-30 PROCEDURE — 36415 COLL VENOUS BLD VENIPUNCTURE: CPT

## 2018-06-30 PROCEDURE — 84146 ASSAY OF PROLACTIN: CPT

## 2018-06-30 PROCEDURE — 93005 ELECTROCARDIOGRAM TRACING: CPT | Performed by: EMERGENCY MEDICINE

## 2018-06-30 PROCEDURE — 2580000003 HC RX 258: Performed by: EMERGENCY MEDICINE

## 2018-06-30 PROCEDURE — 2580000003 HC RX 258: Performed by: INTERNAL MEDICINE

## 2018-06-30 PROCEDURE — 82962 GLUCOSE BLOOD TEST: CPT

## 2018-06-30 PROCEDURE — 96372 THER/PROPH/DIAG INJ SC/IM: CPT

## 2018-06-30 PROCEDURE — 84484 ASSAY OF TROPONIN QUANT: CPT

## 2018-06-30 PROCEDURE — 6370000000 HC RX 637 (ALT 250 FOR IP): Performed by: INTERNAL MEDICINE

## 2018-06-30 PROCEDURE — 99285 EMERGENCY DEPT VISIT HI MDM: CPT

## 2018-06-30 PROCEDURE — 83880 ASSAY OF NATRIURETIC PEPTIDE: CPT

## 2018-06-30 PROCEDURE — 83605 ASSAY OF LACTIC ACID: CPT

## 2018-06-30 PROCEDURE — 87088 URINE BACTERIA CULTURE: CPT

## 2018-06-30 RX ORDER — SODIUM CHLORIDE 0.9 % (FLUSH) 0.9 %
10 SYRINGE (ML) INJECTION EVERY 12 HOURS SCHEDULED
Status: DISCONTINUED | OUTPATIENT
Start: 2018-06-30 | End: 2018-07-03 | Stop reason: HOSPADM

## 2018-06-30 RX ORDER — ISOSORBIDE MONONITRATE 30 MG/1
30 TABLET, EXTENDED RELEASE ORAL DAILY
Status: DISCONTINUED | OUTPATIENT
Start: 2018-07-01 | End: 2018-07-03 | Stop reason: HOSPADM

## 2018-06-30 RX ORDER — FOLIC ACID 1 MG/1
500 TABLET ORAL DAILY
Status: DISCONTINUED | OUTPATIENT
Start: 2018-07-01 | End: 2018-07-03 | Stop reason: HOSPADM

## 2018-06-30 RX ORDER — 0.9 % SODIUM CHLORIDE 0.9 %
500 INTRAVENOUS SOLUTION INTRAVENOUS ONCE
Status: COMPLETED | OUTPATIENT
Start: 2018-06-30 | End: 2018-06-30

## 2018-06-30 RX ORDER — OXYBUTYNIN CHLORIDE 5 MG/1
5 TABLET ORAL NIGHTLY
Status: DISCONTINUED | OUTPATIENT
Start: 2018-06-30 | End: 2018-07-03 | Stop reason: HOSPADM

## 2018-06-30 RX ORDER — NICOTINE POLACRILEX 4 MG
15 LOZENGE BUCCAL PRN
Status: DISCONTINUED | OUTPATIENT
Start: 2018-06-30 | End: 2018-07-03 | Stop reason: HOSPADM

## 2018-06-30 RX ORDER — SIMVASTATIN 10 MG
10 TABLET ORAL NIGHTLY
Status: DISCONTINUED | OUTPATIENT
Start: 2018-06-30 | End: 2018-07-03 | Stop reason: HOSPADM

## 2018-06-30 RX ORDER — DEXTROSE MONOHYDRATE 25 G/50ML
12.5 INJECTION, SOLUTION INTRAVENOUS PRN
Status: DISCONTINUED | OUTPATIENT
Start: 2018-06-30 | End: 2018-07-03 | Stop reason: HOSPADM

## 2018-06-30 RX ORDER — AMLODIPINE BESYLATE 5 MG/1
5 TABLET ORAL EVERY EVENING
Status: DISCONTINUED | OUTPATIENT
Start: 2018-06-30 | End: 2018-07-03 | Stop reason: HOSPADM

## 2018-06-30 RX ORDER — ACETAMINOPHEN 325 MG/1
650 TABLET ORAL EVERY 4 HOURS PRN
Status: DISCONTINUED | OUTPATIENT
Start: 2018-06-30 | End: 2018-07-03 | Stop reason: HOSPADM

## 2018-06-30 RX ORDER — SODIUM CHLORIDE 9 MG/ML
INJECTION, SOLUTION INTRAVENOUS CONTINUOUS
Status: DISCONTINUED | OUTPATIENT
Start: 2018-06-30 | End: 2018-07-03

## 2018-06-30 RX ORDER — LEVOTHYROXINE SODIUM 0.1 MG/1
100 TABLET ORAL DAILY
Status: DISCONTINUED | OUTPATIENT
Start: 2018-07-01 | End: 2018-07-03 | Stop reason: HOSPADM

## 2018-06-30 RX ORDER — DEXTROSE MONOHYDRATE 50 MG/ML
100 INJECTION, SOLUTION INTRAVENOUS PRN
Status: DISCONTINUED | OUTPATIENT
Start: 2018-06-30 | End: 2018-07-03 | Stop reason: HOSPADM

## 2018-06-30 RX ORDER — ONDANSETRON 2 MG/ML
4 INJECTION INTRAMUSCULAR; INTRAVENOUS EVERY 6 HOURS PRN
Status: DISCONTINUED | OUTPATIENT
Start: 2018-06-30 | End: 2018-07-03 | Stop reason: HOSPADM

## 2018-06-30 RX ORDER — SODIUM CHLORIDE 0.9 % (FLUSH) 0.9 %
10 SYRINGE (ML) INJECTION PRN
Status: DISCONTINUED | OUTPATIENT
Start: 2018-06-30 | End: 2018-07-03 | Stop reason: HOSPADM

## 2018-06-30 RX ORDER — ASPIRIN 81 MG/1
81 TABLET ORAL DAILY
Status: DISCONTINUED | OUTPATIENT
Start: 2018-07-01 | End: 2018-07-03 | Stop reason: HOSPADM

## 2018-06-30 RX ADMIN — AMLODIPINE BESYLATE 5 MG: 5 TABLET ORAL at 20:31

## 2018-06-30 RX ADMIN — SODIUM CHLORIDE: 9 INJECTION, SOLUTION INTRAVENOUS at 19:36

## 2018-06-30 RX ADMIN — SIMVASTATIN 10 MG: 10 TABLET, FILM COATED ORAL at 21:25

## 2018-06-30 RX ADMIN — ENOXAPARIN SODIUM 40 MG: 40 INJECTION SUBCUTANEOUS at 20:31

## 2018-06-30 RX ADMIN — Medication 10 ML: at 19:36

## 2018-06-30 RX ADMIN — OXYBUTYNIN CHLORIDE 5 MG: 5 TABLET ORAL at 21:25

## 2018-06-30 RX ADMIN — SODIUM CHLORIDE 500 ML: 9 INJECTION, SOLUTION INTRAVENOUS at 14:04

## 2018-06-30 ASSESSMENT — ENCOUNTER SYMPTOMS
DIARRHEA: 0
SHORTNESS OF BREATH: 0
NAUSEA: 0
EYE REDNESS: 0
SINUS PRESSURE: 0
COUGH: 0
EYE DISCHARGE: 0
SORE THROAT: 0
EYE PAIN: 0
WHEEZING: 0
BACK PAIN: 0
ABDOMINAL DISTENTION: 0
VOMITING: 0

## 2018-06-30 ASSESSMENT — PAIN SCALES - GENERAL
PAINLEVEL_OUTOF10: 0
PAINLEVEL_OUTOF10: 0

## 2018-06-30 NOTE — ED PROVIDER NOTES
Hematological: Negative for adenopathy. All other systems reviewed and are negative. Physical Exam   Constitutional: She is oriented to person, place, and time. She appears well-developed and well-nourished. HENT:   Head: Normocephalic and atraumatic. Eyes: Conjunctivae are normal.   Neck: Normal range of motion. Neck supple. Cardiovascular: Normal rate, regular rhythm and normal heart sounds. No murmur heard. Pulmonary/Chest: Effort normal and breath sounds normal. No respiratory distress. She has no wheezes. She has no rales. Left-sided pacemaker noted     Abdominal: Soft. Bowel sounds are normal. There is no tenderness. There is no rebound and no guarding. Musculoskeletal: She exhibits no edema. Neurological: She is alert and oriented to person, place, and time. No cranial nerve deficit. Coordination normal.   Skin: Skin is warm and dry. Nursing note and vitals reviewed. Procedures    MDM           --------------------------------------------- PAST HISTORY ---------------------------------------------  Past Medical History:  has a past medical history of Asthma; Atrioventricular (AV) dissociation; AV block, 1st degree; Diabetes mellitus ([facility] [address] GERD (gastroesophageal reflux disease); Herniated disc; Hyperlipidemia; Hypertension; Mild aortic regurgitation; Mitral regurgitation; Nontoxic uninodular goiter; OA (osteoarthritis of spine); and Obesity. Past Surgical History:  has a past surgical history that includes Hysterectomy and A-V cardiac pacemaker insertion (Left, 06/13/2016). Social History:  reports that she has never smoked. She has never used smokeless tobacco. She reports that she does not drink alcohol or use drugs. Family History: family history is not on file. The patients home medications have been reviewed. Allergies: Ace inhibitors; Alendronate sodium; Flomax [tamsulosin hcl];  Lisinopril; Penicillins; and Vioxx

## 2018-07-01 LAB
ANION GAP SERPL CALCULATED.3IONS-SCNC: 14 MMOL/L (ref 7–16)
BUN BLDV-MCNC: 16 MG/DL (ref 8–23)
CALCIUM SERPL-MCNC: 8.6 MG/DL (ref 8.6–10.2)
CHLORIDE BLD-SCNC: 95 MMOL/L (ref 98–107)
CO2: 24 MMOL/L (ref 22–29)
CREAT SERPL-MCNC: 0.9 MG/DL (ref 0.5–1)
GFR AFRICAN AMERICAN: >60
GFR NON-AFRICAN AMERICAN: 59 ML/MIN/1.73
GLUCOSE BLD-MCNC: 93 MG/DL (ref 74–109)
HCT VFR BLD CALC: 38 % (ref 34–48)
HEMOGLOBIN: 12.9 G/DL (ref 11.5–15.5)
MCH RBC QN AUTO: 29 PG (ref 26–35)
MCHC RBC AUTO-ENTMCNC: 33.9 % (ref 32–34.5)
MCV RBC AUTO: 85.4 FL (ref 80–99.9)
METER GLUCOSE: 103 MG/DL (ref 70–110)
METER GLUCOSE: 126 MG/DL (ref 70–110)
METER GLUCOSE: 71 MG/DL (ref 70–110)
METER GLUCOSE: 84 MG/DL (ref 70–110)
PDW BLD-RTO: 13.8 FL (ref 11.5–15)
PLATELET # BLD: 332 E9/L (ref 130–450)
PMV BLD AUTO: 9.4 FL (ref 7–12)
POTASSIUM SERPL-SCNC: 3.1 MMOL/L (ref 3.5–5)
RBC # BLD: 4.45 E12/L (ref 3.5–5.5)
SODIUM BLD-SCNC: 133 MMOL/L (ref 132–146)
TROPONIN: <0.01 NG/ML (ref 0–0.03)
URINE CULTURE, ROUTINE: NORMAL
WBC # BLD: 6.7 E9/L (ref 4.5–11.5)

## 2018-07-01 PROCEDURE — G8978 MOBILITY CURRENT STATUS: HCPCS

## 2018-07-01 PROCEDURE — 96372 THER/PROPH/DIAG INJ SC/IM: CPT

## 2018-07-01 PROCEDURE — 80048 BASIC METABOLIC PNL TOTAL CA: CPT

## 2018-07-01 PROCEDURE — 97162 PT EVAL MOD COMPLEX 30 MIN: CPT

## 2018-07-01 PROCEDURE — 82962 GLUCOSE BLOOD TEST: CPT

## 2018-07-01 PROCEDURE — 85027 COMPLETE CBC AUTOMATED: CPT

## 2018-07-01 PROCEDURE — 99223 1ST HOSP IP/OBS HIGH 75: CPT | Performed by: INTERNAL MEDICINE

## 2018-07-01 PROCEDURE — G8979 MOBILITY GOAL STATUS: HCPCS

## 2018-07-01 PROCEDURE — 6360000002 HC RX W HCPCS: Performed by: INTERNAL MEDICINE

## 2018-07-01 PROCEDURE — G8987 SELF CARE CURRENT STATUS: HCPCS

## 2018-07-01 PROCEDURE — 2580000003 HC RX 258: Performed by: INTERNAL MEDICINE

## 2018-07-01 PROCEDURE — G8988 SELF CARE GOAL STATUS: HCPCS

## 2018-07-01 PROCEDURE — 97165 OT EVAL LOW COMPLEX 30 MIN: CPT

## 2018-07-01 PROCEDURE — 6370000000 HC RX 637 (ALT 250 FOR IP): Performed by: INTERNAL MEDICINE

## 2018-07-01 PROCEDURE — G0378 HOSPITAL OBSERVATION PER HR: HCPCS

## 2018-07-01 PROCEDURE — 36415 COLL VENOUS BLD VENIPUNCTURE: CPT

## 2018-07-01 RX ORDER — POTASSIUM CHLORIDE 20 MEQ/1
40 TABLET, EXTENDED RELEASE ORAL ONCE
Status: COMPLETED | OUTPATIENT
Start: 2018-07-01 | End: 2018-07-01

## 2018-07-01 RX ADMIN — FOLIC ACID 500 MCG: 1 TABLET ORAL at 09:10

## 2018-07-01 RX ADMIN — ISOSORBIDE MONONITRATE 30 MG: 30 TABLET ORAL at 09:10

## 2018-07-01 RX ADMIN — POTASSIUM CHLORIDE 40 MEQ: 20 TABLET, EXTENDED RELEASE ORAL at 13:02

## 2018-07-01 RX ADMIN — SODIUM CHLORIDE: 9 INJECTION, SOLUTION INTRAVENOUS at 17:31

## 2018-07-01 RX ADMIN — ASPIRIN 81 MG: 81 TABLET, COATED ORAL at 09:10

## 2018-07-01 RX ADMIN — SIMVASTATIN 10 MG: 10 TABLET, FILM COATED ORAL at 21:26

## 2018-07-01 RX ADMIN — OXYBUTYNIN CHLORIDE 5 MG: 5 TABLET ORAL at 21:26

## 2018-07-01 RX ADMIN — LEVOTHYROXINE SODIUM 100 MCG: 100 TABLET ORAL at 06:24

## 2018-07-01 RX ADMIN — ENOXAPARIN SODIUM 40 MG: 40 INJECTION SUBCUTANEOUS at 21:26

## 2018-07-01 RX ADMIN — AMLODIPINE BESYLATE 5 MG: 5 TABLET ORAL at 17:38

## 2018-07-01 ASSESSMENT — PAIN SCALES - GENERAL
PAINLEVEL_OUTOF10: 0

## 2018-07-01 NOTE — H&P
tablet by mouth daily  Omega-3 Fatty Acids (FISH OIL) 1200 MG CPDR, Take by mouth  Calcium Carbonate-Vitamin D (CALTRATE 600+D PO), Take by mouth  vitamin B-12 (CYANOCOBALAMIN) 1000 MCG tablet, Take 1 tablet by mouth daily  Cholecalciferol (VITAMIN D) 2000 units CAPS capsule, Take 2,000 Units by mouth daily  aspirin 81 MG EC tablet, Take 1 tablet by mouth daily  folic acid (FOLVITE) 660 MCG tablet, Take 1 tablet by mouth daily  levothyroxine (SYNTHROID) 100 MCG tablet, Take 1 tablet by mouth daily  acetaminophen (APAP EXTRA STRENGTH) 500 MG tablet, Take 1 tablet by mouth every 6 hours as needed for Pain  Elastic Bandages & Supports (MEDICAL COMPRESSION STOCKINGS) MISC, 1 each by Does not apply route daily as needed (swelling)  Lidocaine 0.5 % GEL, Apply 2 oz topically daily as needed (pain)  Loratadine (CLARITIN PO), Take 10 mg by mouth daily     Allergies:    Ace inhibitors; Alendronate sodium; Flomax [tamsulosin hcl]; Lisinopril; Penicillins; and Vioxx [rofecoxib]    Social History:    reports that she has never smoked. She has never used smokeless tobacco. She reports that she does not drink alcohol or use drugs.     Family History:     Non contributory given her advanced age    REVIEW OF SYSTEMS    Constitutional: negative for chills and fevers  Eyes: negative for icterus and redness  Ears, nose, mouth, throat, and face: negative for epistaxis, hearing loss, nasal congestion, sore mouth, sore throat and tinnitus  Respiratory: negative for cough and hemoptysis  Cardiovascular: negative for chest pain and palpitations  Gastrointestinal: negative for abdominal pain and diarrhea  Genitourinary:negative for dysuria and frequency  Integument/breast: negative for pruritus and rash  Hematologic/lymphatic: negative for bleeding and easy bruising  Musculoskeletal:negative for arthralgias and back pain  Neurological: negative for headaches and memory problems  Behavioral/Psych: negative for anxiety and depression  Endocrine: negative for temperature intolerance  Allergic/Immunologic: negative for anaphylaxis and angioedema    PHYSICAL EXAM:    Vitals:  /65   Pulse 81   Temp 96.3 °F (35.7 °C) (Temporal)   Resp 16   Ht 5' 4\" (1.626 m)   Wt 190 lb (86.2 kg)   SpO2 98%   BMI 32.61 kg/m²     General appearance: alert, appears stated age and cooperative  Head: Normocephalic, without obvious abnormality, atraumatic  Eyes: conjunctivae/corneas clear. PERRL, EOM's intact. Fundi benign. Ears: normal TM's and external ear canals both ears  Nose: Nares normal. Septum midline. Mucosa normal. No drainage or sinus tenderness.   Throat: lips, mucosa, and tongue normal; teeth and gums normal  Neck: no adenopathy, no carotid bruit, no JVD, supple, symmetrical, trachea midline and thyroid not enlarged, symmetric, no tenderness/mass/nodules  Lungs: clear to auscultation bilaterally  Heart: regular rate and rhythm, S1, S2 normal, no murmur, click, rub or gallop  Abdomen: soft, non-tender; bowel sounds normal; no masses,  no organomegaly  Extremities: extremities normal, atraumatic, no cyanosis or edema  Pulses: 2+ and symmetric  Skin: Skin color, texture, turgor normal. No rashes or lesions  Neurologic: Grossly normal    LABS:    CBC with Differential:    Lab Results   Component Value Date    WBC 6.7 07/01/2018    RBC 4.45 07/01/2018    HGB 12.9 07/01/2018    HCT 38.0 07/01/2018     07/01/2018    MCV 85.4 07/01/2018    MCH 29.0 07/01/2018    MCHC 33.9 07/01/2018    RDW 13.8 07/01/2018    SEGSPCT 44 10/30/2013    LYMPHOPCT 26.5 06/30/2018    MONOPCT 14.5 06/30/2018    BASOPCT 0.5 06/30/2018    MONOSABS 1.08 06/30/2018    LYMPHSABS 1.98 06/30/2018    EOSABS 0.14 06/30/2018    BASOSABS 0.04 06/30/2018     CMP:    Lab Results   Component Value Date     07/01/2018    K 3.1 07/01/2018    CL 95 07/01/2018    CO2 24 07/01/2018    BUN 16 07/01/2018    CREATININE 0.9 07/01/2018    GFRAA >60 07/01/2018    LABGLOM 59 underlying mild central pulmonary vascular congestion. 3. Possible left basilar infiltrate/pneumonia versus atelectasis,   clinical correlation for infectious parameters recommended with   suspected small amount left pleural fluid. 4. Right and moderate elevation. 5. Osteopenia.             EKG: This EKG is signed and interpreted by me. Rate: 78  Rhythm: atrially paced, ventricular paced  Interpretation: no acute changes  Comparison: stable as compared to patient's most recent EKG      Problem list:    Patient Active Problem List   Diagnosis    HTN (hypertension), benign    Diabetes mellitus type 2, diet-controlled ([facility] [address]    AI (aortic insufficiency)    Pacemaker    Hyperlipidemia LDL goal <100    Acquired hypothyroidism    Syncope    Hyponatremia         ASSESSMENT:      1. Syncope, likely cardiogenic with apparent convulsion    2. Essential hypertension    3. Diabetes mellitus type II, diet-controlled    4. Aortic insufficiency    5. Hypothyroidism    6. Hyperlipidemia    7. Hypokalemia    8. History of dual-chamber is maker for complete heart block    9. Brief episode of paroxysmal atrial fibrillation on pacemaker interrogation 4/5/2018    PLAN:     1. Continue IV fluids. 2. Replace potassium    3. Monitor blood glucose    4. Consult cardiology    5. No need to repeat echocardiogram    6.  Discussed with juvencio, they would like to look into alternative living arrangement for patient, possible assisted living versus subacute rehab, was  to assist, juvencio states she will be here tomorrow to discuss with     Natalia Monroe D.O., HÉCTOR  11:00 AM  7/1/2018

## 2018-07-01 NOTE — PROGRESS NOTES
prescription per patient    Glasses: yes [x] no [] reading []      UE Assessment:  Hand Dominance: Right [x]  Left []     Strength ROM Additional Info:    RUE   4-/5 WFL fair  and WFL FMC/dexterity noted during ADL tasks     LUE 4-/5 WFL fair  and WFL FMC/dexterity noted during ADL tasks   Sensation: intact  Tone: WFL  Edema:none noted    Functional Assessment:   Initial Eval Status  Comments   Feeding  Independent    Grooming  Setup sitting     Upper Body Dressing Setup sitting     Lower Body Dressing SBA    Bathing n/t    Toileting  SBA    Bed Mobility  Supine to Sit: SBA  Sit to Supine:n/t No dizziness or light headedness noted   Functional Transfers Sit to stand- no light headedness noted SBA bed to chair with Q cane   Functional Mobility N/t session interrupted by physician      Sit balance: good  Stand balance: good-  Endurance/Activity tolerance: good-                              Comments: Upon arrival pt supine in bed. At end of session pt sitting up in chair with all devices within reach, all lines and tubes intact. Nursing notified. Treatment: OT evaluation, education on seizure like episodes and safety with performing ADL's and driving, LE dressing task, bed mobility, OT for functional assessment of  ADL, Functional Transfer/Mobility Training, Equipment Needs, Energy Conservation Techniques, Pt/Family Education, Ther Ex., OT role and POC reviewed. Patient and friend demo good-  understanding of functional limitations and safety.        Assessment of current deficits   Functional mobility [x]  ADLs [x] Strength [x]  Cognition []  Functional transfers  [x] IADLs [x] Safety Awareness [x]  Endurance [x]  Fine Motor Coordination [] Balance [] Vision/perception [] Sensation []   Gross Motor Coordination [] ROM []       Eval Complexity: low  Profile and History- low  Assessment of Occupational Performance and Identification of Deficits- low  Clinical Decision Making- low    Treatment frequency: OT 1-2x     Plan of Care:  ADL retraining []   Equipment needs [x]   Neuromuscular re-education []  Energy Conservation Techniques [x]  Functional Transfer training [x]  Patient and/or Family Education [x]  Functional Mobility training [x]  Environmental Modifications []  Cognitive re-training []    Compensatory techniques for ADLs [x]  Splinting Needs []   Positioning to improve overall function []  Other: []      Rehab Potential: good    Patient / Family Goal: home with assistance by friends as needed. Short term goals  Time Frame: 1-3 days  STG #1:  Demo good follow through of bathroom safety and Q cane precautions in ADL's and transfers      STG #2:  Increase grooming to mod I in standing/ sitting with QC  STGl #3   Increase UE dressing and bathing to mod I    STG #4   Increase LE dressing and bathing to mod I with AE prn   STG #5   Increase functional commode transfer to mod I with QC  STG #6   Increase safety and problem solving to good during ADL's  STG #7   Increase functional activity tolerance to good in ADL tasks      Patient and/or family understands diagnosis, prognosis and plan of care: yes    [] Malnutrition indicators have been identified and nursing has been notified to ensure a dietitian consult is ordered. Comments: Based on patient's functional performance as stated above and level of assistance needed prior to admission, this therapist believes that the patient would benefit from further skilled [address] [place] [address] following hospital stay in an effort to increase safety, functional independence, and quality of life. Time in: 12:29  Time out: 12:45  OT evaluation    Alon Johnson.  [address], [address]

## 2018-07-02 ENCOUNTER — APPOINTMENT (OUTPATIENT)
Dept: NEUROLOGY | Age: 83
End: 2018-07-02
Payer: MEDICARE

## 2018-07-02 LAB
LEFT VENTRICULAR EJECTION FRACTION HIGH VALUE: 55 %
LEFT VENTRICULAR EJECTION FRACTION MODE: NORMAL
LV EF: 50 %
METER GLUCOSE: 107 MG/DL (ref 70–110)
METER GLUCOSE: 151 MG/DL (ref 70–110)
METER GLUCOSE: 88 MG/DL (ref 70–110)

## 2018-07-02 PROCEDURE — 6360000004 HC RX CONTRAST MEDICATION: Performed by: INTERNAL MEDICINE

## 2018-07-02 PROCEDURE — G0378 HOSPITAL OBSERVATION PER HR: HCPCS

## 2018-07-02 PROCEDURE — 93308 TTE F-UP OR LMTD: CPT

## 2018-07-02 PROCEDURE — 6360000002 HC RX W HCPCS: Performed by: INTERNAL MEDICINE

## 2018-07-02 PROCEDURE — 82962 GLUCOSE BLOOD TEST: CPT

## 2018-07-02 PROCEDURE — 2580000003 HC RX 258: Performed by: INTERNAL MEDICINE

## 2018-07-02 PROCEDURE — 95819 EEG AWAKE AND ASLEEP: CPT

## 2018-07-02 PROCEDURE — 96372 THER/PROPH/DIAG INJ SC/IM: CPT

## 2018-07-02 PROCEDURE — 6370000000 HC RX 637 (ALT 250 FOR IP): Performed by: INTERNAL MEDICINE

## 2018-07-02 RX ADMIN — FOLIC ACID 500 MCG: 1 TABLET ORAL at 09:20

## 2018-07-02 RX ADMIN — PERFLUTREN 1.65 MG: 6.52 INJECTION, SUSPENSION INTRAVENOUS at 14:07

## 2018-07-02 RX ADMIN — Medication 10 ML: at 09:21

## 2018-07-02 RX ADMIN — ENOXAPARIN SODIUM 40 MG: 40 INJECTION SUBCUTANEOUS at 21:30

## 2018-07-02 RX ADMIN — AMLODIPINE BESYLATE 5 MG: 5 TABLET ORAL at 17:26

## 2018-07-02 RX ADMIN — ISOSORBIDE MONONITRATE 30 MG: 30 TABLET ORAL at 09:20

## 2018-07-02 RX ADMIN — ACETAMINOPHEN 650 MG: 325 TABLET, FILM COATED ORAL at 22:23

## 2018-07-02 RX ADMIN — LEVOTHYROXINE SODIUM 100 MCG: 100 TABLET ORAL at 06:27

## 2018-07-02 RX ADMIN — OXYBUTYNIN CHLORIDE 5 MG: 5 TABLET ORAL at 21:30

## 2018-07-02 RX ADMIN — ASPIRIN 81 MG: 81 TABLET, COATED ORAL at 09:21

## 2018-07-02 RX ADMIN — SIMVASTATIN 10 MG: 10 TABLET, FILM COATED ORAL at 21:30

## 2018-07-02 RX ADMIN — SODIUM CHLORIDE: 9 INJECTION, SOLUTION INTRAVENOUS at 04:35

## 2018-07-02 ASSESSMENT — PAIN SCALES - GENERAL
PAINLEVEL_OUTOF10: 0
PAINLEVEL_OUTOF10: 0
PAINLEVEL_OUTOF10: 5
PAINLEVEL_OUTOF10: 0

## 2018-07-02 ASSESSMENT — PAIN DESCRIPTION - PAIN TYPE: TYPE: ACUTE PAIN

## 2018-07-02 ASSESSMENT — PAIN DESCRIPTION - LOCATION: LOCATION: EAR;NECK

## 2018-07-02 ASSESSMENT — PAIN DESCRIPTION - DESCRIPTORS: DESCRIPTORS: DISCOMFORT

## 2018-07-02 ASSESSMENT — PAIN DESCRIPTION - ONSET: ONSET: GRADUAL

## 2018-07-02 ASSESSMENT — PAIN DESCRIPTION - FREQUENCY: FREQUENCY: INTERMITTENT

## 2018-07-02 ASSESSMENT — PAIN DESCRIPTION - ORIENTATION: ORIENTATION: RIGHT

## 2018-07-02 NOTE — CARE COORDINATION
Social Work/Discharge Planning:    BECCA spoke with Liaison Chad Wei at [facility], [employer] stated that [facility] is able to accept pt pending medicaid. BECCA will follow up with pt niece tomorrow 7/3 in regards to facility choice as pt niece will be visiting [facility] today 7/2. BECCA will follow.     Electronically signed by PAMELA Hernández on 7/2/2018 at 5:35 PM

## 2018-07-02 NOTE — PROGRESS NOTES
Called and left message with Dr. Murali henning at bedside and would like to speak with her, also wanting criteria to make her inpatient ifpossible

## 2018-07-02 NOTE — PROCEDURES
[address]                   [address] Clarance Simple                            ELECTROENCEPHALOGRAM REPORT    PATIENT NAME: Kalyani Jimenez                   :        1933  MED REC NO:   [id number]                            ROOM:       8211  ACCOUNT NO:   [de-identified]                           ADMIT DATE: 2018  PROVIDER:     Miles Ugarte MD    DATE OF EE2018    DIAGNOSIS:  Awake normal, sleep normal report. A 17-channel EEG was recorded and demonstrates a background alpha rhythm of  9-10 Hz following eye closure. During the sleep portion of the record,  there was no activation. INTERPRETATION:  Normal awake and sleep EEG. A normal EEG does not rule  out seizure disorder. Clinical correlation advised.         Clark Malagon MD    D: 2018 13:20:12       T: 2018 13:22:16     [email or web address]  Job#: [id number]     Doc#: [id number]    CC:

## 2018-07-03 VITALS
BODY MASS INDEX: 32.44 KG/M2 | TEMPERATURE: 97.7 F | HEIGHT: 64 IN | RESPIRATION RATE: 16 BRPM | HEART RATE: 84 BPM | SYSTOLIC BLOOD PRESSURE: 141 MMHG | DIASTOLIC BLOOD PRESSURE: 82 MMHG | OXYGEN SATURATION: 98 % | WEIGHT: 190 LBS

## 2018-07-03 LAB
METER GLUCOSE: 73 MG/DL (ref 70–110)
METER GLUCOSE: 90 MG/DL (ref 70–110)

## 2018-07-03 PROCEDURE — 97535 SELF CARE MNGMENT TRAINING: CPT

## 2018-07-03 PROCEDURE — 82962 GLUCOSE BLOOD TEST: CPT

## 2018-07-03 PROCEDURE — 6370000000 HC RX 637 (ALT 250 FOR IP): Performed by: INTERNAL MEDICINE

## 2018-07-03 PROCEDURE — G0378 HOSPITAL OBSERVATION PER HR: HCPCS

## 2018-07-03 PROCEDURE — 97110 THERAPEUTIC EXERCISES: CPT

## 2018-07-03 RX ADMIN — ISOSORBIDE MONONITRATE 30 MG: 30 TABLET ORAL at 09:52

## 2018-07-03 RX ADMIN — ASPIRIN 81 MG: 81 TABLET, COATED ORAL at 09:52

## 2018-07-03 RX ADMIN — LEVOTHYROXINE SODIUM 100 MCG: 100 TABLET ORAL at 06:17

## 2018-07-03 RX ADMIN — FOLIC ACID 500 MCG: 1 TABLET ORAL at 09:52

## 2018-07-03 NOTE — PROGRESS NOTES
Nurse to nurse called to [facility] [phone], accepting nurse was Faye Westfall

## 2018-07-03 NOTE — PROGRESS NOTES
Occupational Therapy  OCCUPATIONAL THERAPY PROGRESS REPORT    Date:7/3/2018  Patient Name: Tigre Arzate  MRN: [id number]  : 1933  Room: [address] [place], [place] [address]     Evaluating OT: Pati Gill. BILL RuffR/L #[id number]    Recommended Adaptive Equipment: tbd     AM PAC ADL RAW SCORE -  19/24  G CODE: CHRIS  Diagnosis: syncope and collapse with possible seizure  Surgery:   Past Surgical History:   Procedure Laterality Date    A-V CARDIAC PACEMAKER INSERTION Left 2016    Dual chamber pacemaker [address] by Dr Leila Alvarez          Past Medical History:   Past Medical History:   Diagnosis Date    Asthma     Atrioventricular (AV) dissociation     AV block, 1st degree 2015    Diabetes mellitus ([facility] [address]     GERD (gastroesophageal reflux disease)     Herniated disc     Cervical    Hyperlipidemia     Hypertension     Mild aortic regurgitation 13    Mitral regurgitation 13    mild-moderate    Nontoxic uninodular goiter     OA (osteoarthritis of spine)     cervical spine    Obesity        Precautions: falls, slight [place]    Patient agreeable to treatment yes  Home Living: Pt lives alone in a [facility] apartment with level entry and no steps to negotiate. Bathroom setup: walk in shower with seat and standard commode with HR  Equipment owned: Qcane and shower seat    Prior Level of Function: Independent with ADLs assisted as needed by friends  with IADLs; using Qcane for ambulation. Driving: yes- short distances - advised not to drive until cleared by physician   Medication management:self  Occupation: enjoys [employer] study and bingo at [employer]    Pain Level: pt c/o no pain this session     Cognition: oriented x 3; follows multi step directions.  Latent response time noted  Good- Problem solving skills  Good- Memory   good Sequencing  Additional Comments: alert and cooperative , followed commands with good- safety  Sensory:   Hearing: slight [place]  Vision: needs new prescription per patient    Glasses: yes [x] no [] reading []      UE Assessment:  Hand Dominance: Right [x]  Left []     Strength ROM Additional Info:    RUE   4-/5 WFL fair  and WFL FMC/dexterity noted during ADL tasks     LUE 4-/5 WFL fair  and WFL FMC/dexterity noted during ADL tasks   Sensation: intact  Tone: WFL  Edema:none noted    Functional Assessment:   Initial Eval Status  Current status:   Feeding  Independent indep   Grooming  Setup sitting  setup   Upper Body Dressing Setup sitting  setup   Lower Body Dressing SBA setup   Bathing n/t SBA    Toileting  SBA Sup   Bed Mobility  Supine to Sit: SBA  Sit to Supine:n/t Sup   Functional Transfers Sit to stand- no light headedness noted Sup with SBQC   Functional Mobility N/t session interrupted by physician Sup with SBQC     Sit balance: good  Stand balance: good-  Endurance/Activity tolerance: good-                              Comments: Upon arrival pt supine in bed. At end of session pt sitting up in chair with all devices within reach, all lines and tubes intact. Nursing notified. Treatment: Patient completed bed mobility, func mobilty with SBQC bed>bathroom toilet, completed toileting with distant Sup and hygiene in standing at sink with Sup. Patient completed func mobility to bedside chair and performed B UE there ex 10x1 for shldr movements in all planes. Patient completed LB drsg with setup to don LAZARUS hose and slipper socks.      Assessment of current deficits   Functional mobility [x]  ADLs [x] Strength [x]  Cognition []  Functional transfers  [x] IADLs [x] Safety Awareness [x]  Endurance [x]  Fine Motor Coordination [] Balance [] Vision/perception [] Sensation []   Gross Motor Coordination [] ROM []       Eval Complexity: low  Profile and History- low  Assessment of Occupational Performance and Identification of Deficits- low  Clinical Decision Making- low    Treatment frequency: OT 1-2x     Plan of Care:  ADL retraining []   Equipment needs [x]   Neuromuscular re-education []  Energy Conservation Techniques [x]  Functional Transfer training [x]  Patient and/or Family Education [x]  Functional Mobility training [x]  Environmental Modifications []  Cognitive re-training []    Compensatory techniques for ADLs [x]  Splinting Needs []   Positioning to improve overall function []  Other: []      Rehab Potential: good    Patient / Family Goal: home with assistance by friends as needed. Short term goals  Time Frame: 1-3 days  STG #1:  Demo good follow through of bathroom safety and Q cane precautions in ADL's and transfers      STG #2:  Increase grooming to mod I in standing/ sitting with QC  STGl #3   Increase UE dressing and bathing to mod I    STG #4   Increase LE dressing and bathing to mod I with AE prn   STG #5   Increase functional commode transfer to mod I with QC  STG #6   Increase safety and problem solving to good during ADL's  STG #7   Increase functional activity tolerance to good in ADL tasks      Patient and/or family understands diagnosis, prognosis and plan of care: yes    [] Malnutrition indicators have been identified and nursing has been notified to ensure a dietitian consult is ordered. Comments: Based on patient's functional performance as stated above and level of assistance needed prior to admission, this therapist believes that the patient would benefit from further skilled [address] [place] [address] following hospital stay in an effort to increase safety, functional independence, and quality of life.      Time in: 11:30 am   Time out: 12:00 pm   Total Treatment time: 30 min    Varsha Meyer OTR/L [id number]

## 2018-07-03 NOTE — CARE COORDINATION
Social Work/Discharge Planning:    SW spoke with Marita Verduzco at [facility] stated that HELP will submit for Pending Medicaid for pt for [facility]. Will need Paper N-17 signed, Awaiting Pending medicaid number. SW will follow.     Electronically signed by PAMELA Bolanos on 7/3/2018 at 9:26 AM

## 2018-07-12 ENCOUNTER — TELEPHONE (OUTPATIENT)
Dept: ADMINISTRATIVE | Age: 83
End: 2018-07-12

## 2018-07-17 NOTE — TELEPHONE ENCOUNTER
I spoke with Lopez Juares at [facility] and let her know per Dr Cherisse Kayser recommendation she was to keep her pacemaker appt as scheduled. I confirmed her scheduled pacemaker ck and appt with Dr Jen Ramires  on 10/03 at 1:00. Lopez Juares verbalized understanding.

## 2018-07-23 DIAGNOSIS — Z76.0 MEDICATION REFILL: ICD-10-CM

## 2018-07-24 RX ORDER — ISOSORBIDE MONONITRATE 30 MG/1
TABLET, EXTENDED RELEASE ORAL
Qty: 90 TABLET | Refills: 0 | Status: SHIPPED | OUTPATIENT
Start: 2018-07-24 | End: 2018-09-28 | Stop reason: SDUPTHER

## 2018-08-07 NOTE — DISCHARGE SUMMARY
Physician Discharge Summary     Patient ID:  Boubacar Hawthorne  [id number]  62 y.o.  1933    Admit date: 2018    Discharge date and time:   Admission Diagnoses:   Patient Active Problem List   Diagnosis    HTN (hypertension), benign    Diabetes mellitus type 2, diet-controlled ([facility] [address]    AI (aortic insufficiency)    Pacemaker    Hyperlipidemia LDL goal <100    Acquired hypothyroidism    Syncope and collapse    Hyponatremia       Discharge Diagnoses: as above / syncope    Consults: [address] [place] [address] Course: The patient is a 80 y.o. female patient of Dr Charlie Victoria who presents with syncope. She was in her usual state of health yesterday getting her hair braided when she asked for glass water and she dropped a glass and water came out of her mouth and she began itching. She did lose consciousness for approximately 15 minutes and afterwards was confused. She does have a history of a pacemaker and told her family that she felt that the pacemaker had \"misfire\" over the last week. She denies any shortness of breath chest pain fever chills nausea vomiting or diarrhea. She did have a total breakfast yesterday morning. She did apparently have an episode of nausea and vomiting but there was no urinary incontinence or tongue biting noted. Pt was admitted and treated conservative for syncope. She was seen by ep and device function was evalauted. There were no abnormalities. She was thought to have vasovagal or dehydration syncope. Pt stabl;e to got to [facility] for ongoing rehab and nursing care. No results for input(s): WBC, HGB, HCT, PLT in the last 72 hours. No results for input(s): NA, K, CL, CO2, BUN, CREATININE, CALCIUM in the last 72 hours.     Invalid input(s): GLU    Ct Head Wo Contrast    Result Date: 2018  Patient MRN: [id number] : 1933 Age:  80 years Order Date: 2018 12:15 PM Gender: Female Exam: CT HEAD WO CONTRAST Number of Images: 143 Indication:   New seizure Comparison: Brain MRI 2004. Head CT 2004. Findings: This examination was performed on a CT scanner with dose reduction. Technique: Low-dose CT  acquisition technique included one of following options; 1 . Automated exposure control, 2. Adjustment of MA and or KV according to patient's size or 3. Use of iterative reconstruction. Moderately severe hyperostosis frontalis interna. Moderate to moderately severe cerebral volume loss/atrophy. No intraparenchymal hemorrhage. No extra-axial fluid collection or hematoma. Hypodensities in the bilateral centrum semiovale and periventricular regions. Bilateral basal ganglia calcifications noted dating back to at least 2004. No subfalcine, transtentorial or tonsillar herniation or shift. Vascular calcifications within the bilateral internal carotid artery cavernous segments and the vertebral arteries. . Visualized portions of the superior cervical spine, visualized calvarium, mastoid air cells, elevation, calvarium, bony paranasal sinuses, bilateral zygomatic arches, bony orbital walls, and the visualized skull base demonstrate no evidence of an acute fracture or lytic or blastic bony lesion. 1. No CT evidence of acute intracranial abnormality, as questioned. 2. Moderately severe hyperostosis frontalis interna. 3. Moderate to moderately severe cerebral volume loss/atrophy. 4. Moderate white matter changes consistent with sequelae small vessel ischemic disease. 5. Vascular calcifications within the bilateral internal carotid artery cavernous segments and the vertebral arteries. Sheree Yee Chest Portable    Result Date: 2018  Patient MRN: [id number] : 1933 Age:  80 years Gender: Female Order Date: 2018 12:15 PM Exam: XR CHEST PORTABLE Number of Views: 1 Indication:   Syncope checkup Comparison: Chest radiograph 2018 Findings:  There is a stable, enlarged cardiomediastinal silhouette with osteopenia with central pulmonary vascular congestion and streaky opacification in the left lung base with right hemidiaphragmatic elevation and possible small left pleural effusion. Clerance Riis No pneumothorax. ALERT:  THIS IS AN ABNORMAL REPORT 1. Stable, enlarged cardiomediastinal silhouette. . 2. Suspected underlying mild central pulmonary vascular congestion. 3. Possible left basilar infiltrate/pneumonia versus atelectasis, clinical correlation for infectious parameters recommended with suspected small amount left pleural fluid. 4. Right and moderate elevation. 5. Osteopenia. Discharge Exam:    HEENT: NCAT,  PERRLA, No JVD  Heart:  RRR, no murmurs, gallops, or rubs.   Lungs:  CTA bilaterally, no wheeze, rales or rhonchi  Abd: bowel sounds present, nontender, nondistended, no masses  Extrem:  No clubbing, cyanosis, or edema    Disposition: home    Patient Instructions:      Medication List      CONTINUE taking these medications    acetaminophen 500 MG tablet  Commonly known as:  APAP EXTRA STRENGTH  Take 1 tablet by mouth every 6 hours as needed for Pain     amLODIPine 5 MG tablet  Commonly known as:  NORVASC  Take 1 tablet by mouth every evening     aspirin 81 MG EC tablet  Take 1 tablet by mouth daily     CALTRATE 600+D PO     chlorthalidone 25 MG tablet  Commonly known as:  HYGROTON  Take 1 tablet by mouth daily     CLARITIN PO     Fish Oil 5707 MG Cpdr     folic acid 964 MCG tablet  Commonly known as:  FOLVITE  Take 1 tablet by mouth daily     levothyroxine 100 MCG tablet  Commonly known as:  SYNTHROID  Take 1 tablet by mouth daily     Lidocaine 0.5 % Gel  Apply 2 oz topically daily as needed (pain)     Medical Compression Stockings Misc  1 each by Does not apply route daily as needed (swelling)     oxybutynin 5 MG tablet  Commonly known as:  DITROPAN  Take 1 tablet by mouth nightly     simvastatin 10 MG tablet  Commonly known as:  ZOCOR  Take 1 tablet by mouth nightly     vitamin B-12 1000 MCG tablet  Commonly known as:  CYANOCOBALAMIN  Take 1 tablet by mouth

## 2018-09-28 DIAGNOSIS — Z76.0 MEDICATION REFILL: ICD-10-CM

## 2018-09-28 DIAGNOSIS — M79.89 LEG SWELLING: ICD-10-CM

## 2018-10-01 RX ORDER — LEVOTHYROXINE SODIUM 0.1 MG/1
100 TABLET ORAL DAILY
Qty: 30 TABLET | Refills: 5 | Status: SHIPPED | OUTPATIENT
Start: 2018-10-01 | End: 2018-12-04 | Stop reason: SDUPTHER

## 2018-10-01 RX ORDER — LANOLIN ALCOHOL/MO/W.PET/CERES
1000 CREAM (GRAM) TOPICAL DAILY
Qty: 30 TABLET | Refills: 3 | Status: SHIPPED
Start: 2018-10-01 | End: 2020-07-28

## 2018-10-01 RX ORDER — OXYBUTYNIN CHLORIDE 5 MG/1
5 TABLET ORAL NIGHTLY
Qty: 90 TABLET | Refills: 1 | Status: SHIPPED | OUTPATIENT
Start: 2018-10-01 | End: 2018-10-12 | Stop reason: SDUPTHER

## 2018-10-01 RX ORDER — AMLODIPINE BESYLATE 5 MG/1
5 TABLET ORAL EVERY EVENING
Qty: 90 TABLET | Refills: 0 | Status: SHIPPED | OUTPATIENT
Start: 2018-10-01 | End: 2018-11-28 | Stop reason: SDUPTHER

## 2018-10-01 RX ORDER — CHLORTHALIDONE 25 MG/1
25 TABLET ORAL DAILY
Qty: 30 TABLET | Refills: 2 | Status: SHIPPED | OUTPATIENT
Start: 2018-10-01 | End: 2018-12-03 | Stop reason: SDUPTHER

## 2018-10-01 RX ORDER — ISOSORBIDE MONONITRATE 30 MG/1
TABLET, EXTENDED RELEASE ORAL
Qty: 90 TABLET | Refills: 0 | Status: SHIPPED | OUTPATIENT
Start: 2018-10-01 | End: 2018-10-03

## 2018-10-01 RX ORDER — SIMVASTATIN 10 MG
10 TABLET ORAL NIGHTLY
Qty: 30 TABLET | Refills: 3 | Status: SHIPPED | OUTPATIENT
Start: 2018-10-01 | End: 2019-01-03 | Stop reason: SDUPTHER

## 2018-10-01 RX ORDER — ASPIRIN 81 MG/1
81 TABLET ORAL DAILY
Qty: 30 TABLET | Refills: 3 | Status: SHIPPED | OUTPATIENT
Start: 2018-10-01 | End: 2019-01-30 | Stop reason: SDUPTHER

## 2018-10-01 RX ORDER — MULTIVIT-MIN/IRON/FOLIC ACID/K 18-600-40
2000 CAPSULE ORAL DAILY
Qty: 30 CAPSULE | Refills: 3 | Status: SHIPPED | OUTPATIENT
Start: 2018-10-01 | End: 2018-12-04 | Stop reason: SDUPTHER

## 2018-10-01 RX ORDER — LANOLIN ALCOHOL/MO/W.PET/CERES
400 CREAM (GRAM) TOPICAL DAILY
Qty: 30 TABLET | Refills: 2 | Status: SHIPPED | OUTPATIENT
Start: 2018-10-01 | End: 2018-11-08 | Stop reason: SDUPTHER

## 2018-10-01 RX ORDER — ACETAMINOPHEN 500 MG
500 TABLET ORAL EVERY 6 HOURS PRN
Qty: 100 TABLET | Refills: 2 | Status: SHIPPED | OUTPATIENT
Start: 2018-10-01 | End: 2019-01-17 | Stop reason: SDUPTHER

## 2018-10-02 RX ORDER — LORATADINE 10 MG/1
10 TABLET ORAL DAILY
Qty: 30 TABLET | Refills: 5 | Status: SHIPPED | OUTPATIENT
Start: 2018-10-02 | End: 2019-04-10 | Stop reason: SDUPTHER

## 2018-10-02 RX ORDER — POTASSIUM CHLORIDE 20 MEQ/1
20 TABLET, EXTENDED RELEASE ORAL DAILY
Qty: 30 TABLET | Refills: 0 | Status: SHIPPED | OUTPATIENT
Start: 2018-10-02 | End: 2018-10-15 | Stop reason: SDUPTHER

## 2018-10-03 ENCOUNTER — NURSE ONLY (OUTPATIENT)
Dept: CARDIOLOGY CLINIC | Age: 83
End: 2018-10-03
Payer: MEDICARE

## 2018-10-03 ENCOUNTER — OFFICE VISIT (OUTPATIENT)
Dept: CARDIOLOGY CLINIC | Age: 83
End: 2018-10-03
Payer: MEDICARE

## 2018-10-03 VITALS
WEIGHT: 157 LBS | DIASTOLIC BLOOD PRESSURE: 70 MMHG | HEIGHT: 67 IN | BODY MASS INDEX: 24.64 KG/M2 | RESPIRATION RATE: 20 BRPM | SYSTOLIC BLOOD PRESSURE: 118 MMHG | HEART RATE: 88 BPM

## 2018-10-03 DIAGNOSIS — J45.20 MILD INTERMITTENT ASTHMA WITHOUT COMPLICATION: ICD-10-CM

## 2018-10-03 DIAGNOSIS — E03.9 ACQUIRED HYPOTHYROIDISM: Chronic | ICD-10-CM

## 2018-10-03 DIAGNOSIS — I38 VHD (VALVULAR HEART DISEASE): Primary | ICD-10-CM

## 2018-10-03 DIAGNOSIS — E11.9 DIABETES MELLITUS TYPE 2, DIET-CONTROLLED (HCC): Chronic | ICD-10-CM

## 2018-10-03 DIAGNOSIS — Z87.898 H/O SYNCOPE: ICD-10-CM

## 2018-10-03 DIAGNOSIS — Z95.0 PACEMAKER: Chronic | ICD-10-CM

## 2018-10-03 DIAGNOSIS — M50.30 DDD (DEGENERATIVE DISC DISEASE), CERVICAL: ICD-10-CM

## 2018-10-03 DIAGNOSIS — I10 ESSENTIAL HYPERTENSION: ICD-10-CM

## 2018-10-03 DIAGNOSIS — R55 SYNCOPE AND COLLAPSE: ICD-10-CM

## 2018-10-03 DIAGNOSIS — M15.9 PRIMARY OSTEOARTHRITIS INVOLVING MULTIPLE JOINTS: ICD-10-CM

## 2018-10-03 DIAGNOSIS — Z95.0 PACEMAKER: Primary | Chronic | ICD-10-CM

## 2018-10-03 DIAGNOSIS — I44.2 CHB (COMPLETE HEART BLOCK) (HCC): ICD-10-CM

## 2018-10-03 DIAGNOSIS — I34.0 NON-RHEUMATIC MITRAL REGURGITATION: ICD-10-CM

## 2018-10-03 DIAGNOSIS — E78.5 HYPERLIPIDEMIA LDL GOAL <100: Chronic | ICD-10-CM

## 2018-10-03 DIAGNOSIS — I35.1 NONRHEUMATIC AORTIC VALVE INSUFFICIENCY: ICD-10-CM

## 2018-10-03 PROBLEM — M15.0 PRIMARY OSTEOARTHRITIS INVOLVING MULTIPLE JOINTS: Status: ACTIVE | Noted: 2018-10-03

## 2018-10-03 PROCEDURE — 1101F PT FALLS ASSESS-DOCD LE1/YR: CPT | Performed by: INTERNAL MEDICINE

## 2018-10-03 PROCEDURE — 1123F ACP DISCUSS/DSCN MKR DOCD: CPT | Performed by: INTERNAL MEDICINE

## 2018-10-03 PROCEDURE — 1090F PRES/ABSN URINE INCON ASSESS: CPT | Performed by: INTERNAL MEDICINE

## 2018-10-03 PROCEDURE — G8484 FLU IMMUNIZE NO ADMIN: HCPCS | Performed by: INTERNAL MEDICINE

## 2018-10-03 PROCEDURE — G8399 PT W/DXA RESULTS DOCUMENT: HCPCS | Performed by: INTERNAL MEDICINE

## 2018-10-03 PROCEDURE — 4040F PNEUMOC VAC/ADMIN/RCVD: CPT | Performed by: INTERNAL MEDICINE

## 2018-10-03 PROCEDURE — 99214 OFFICE O/P EST MOD 30 MIN: CPT | Performed by: INTERNAL MEDICINE

## 2018-10-03 PROCEDURE — 93000 ELECTROCARDIOGRAM COMPLETE: CPT | Performed by: INTERNAL MEDICINE

## 2018-10-03 PROCEDURE — 1036F TOBACCO NON-USER: CPT | Performed by: INTERNAL MEDICINE

## 2018-10-03 PROCEDURE — G8420 CALC BMI NORM PARAMETERS: HCPCS | Performed by: INTERNAL MEDICINE

## 2018-10-03 PROCEDURE — G8427 DOCREV CUR MEDS BY ELIG CLIN: HCPCS | Performed by: INTERNAL MEDICINE

## 2018-10-03 PROCEDURE — 93280 PM DEVICE PROGR EVAL DUAL: CPT | Performed by: INTERNAL MEDICINE

## 2018-10-03 RX ORDER — METOPROLOL SUCCINATE 50 MG
50 TABLET, EXTENDED RELEASE 24 HR ORAL DAILY
Qty: 30 TABLET | Refills: 11 | Status: SHIPPED | OUTPATIENT
Start: 2018-10-03 | End: 2019-10-23 | Stop reason: SDUPTHER

## 2018-10-03 RX ORDER — METOPROLOL SUCCINATE 50 MG
50 TABLET, EXTENDED RELEASE 24 HR ORAL DAILY
Qty: 30 TABLET | Refills: 11
Start: 2018-10-03 | End: 2018-10-03 | Stop reason: SDUPTHER

## 2018-10-03 NOTE — PROGRESS NOTES
See Karla Karimia report. In clinic device check - patient continues to refuse remote transmissions due to problems with them in the past. Will check Q 6 months with Dr Derek Mohan.

## 2018-10-12 RX ORDER — OXYBUTYNIN CHLORIDE 5 MG/1
5 TABLET ORAL NIGHTLY
Qty: 90 TABLET | Refills: 0 | Status: SHIPPED | OUTPATIENT
Start: 2018-10-12 | End: 2019-03-12 | Stop reason: SDUPTHER

## 2018-10-12 NOTE — TELEPHONE ENCOUNTER
Requested Prescriptions     Pending Prescriptions Disp Refills    oxybutynin (DITROPAN) 5 MG tablet 90 tablet 1     Sig: Take 1 tablet by mouth nightly       Next appt is 12/4/2018  Last appt was 6/8/2018

## 2018-10-15 NOTE — TELEPHONE ENCOUNTER
Pharmacy called to clarify whether pts ditropan was changed, she was on 5mg ER and regular was sent over, please clarify.

## 2018-10-17 RX ORDER — POTASSIUM CHLORIDE 20 MEQ/1
TABLET, EXTENDED RELEASE ORAL
Qty: 30 TABLET | Refills: 0 | Status: SHIPPED | OUTPATIENT
Start: 2018-10-17 | End: 2018-11-14 | Stop reason: SDUPTHER

## 2018-10-17 RX ORDER — OXYBUTYNIN CHLORIDE 5 MG/1
5 TABLET ORAL NIGHTLY
Qty: 90 TABLET | Refills: 0 | OUTPATIENT
Start: 2018-10-17

## 2018-11-08 DIAGNOSIS — Z76.0 MEDICATION REFILL: ICD-10-CM

## 2018-11-08 RX ORDER — LANOLIN ALCOHOL/MO/W.PET/CERES
400 CREAM (GRAM) TOPICAL DAILY
Qty: 30 TABLET | Refills: 2 | Status: SHIPPED | OUTPATIENT
Start: 2018-11-08 | End: 2019-02-22 | Stop reason: SDUPTHER

## 2018-11-08 NOTE — TELEPHONE ENCOUNTER
Requested Prescriptions     Pending Prescriptions Disp Refills    folic acid (FOLVITE) 610 MCG tablet 30 tablet 2     Sig: Take 1 tablet by mouth daily       Next appt is 12/4/2018  Last appt was 6/8/2018

## 2018-11-09 DIAGNOSIS — E87.1 HYPONATREMIA: Primary | ICD-10-CM

## 2018-11-15 RX ORDER — POTASSIUM CHLORIDE 20 MEQ/1
TABLET, EXTENDED RELEASE ORAL
Qty: 30 TABLET | Refills: 0 | Status: SHIPPED | OUTPATIENT
Start: 2018-11-15 | End: 2018-11-28 | Stop reason: SDUPTHER

## 2018-11-28 DIAGNOSIS — Z76.0 MEDICATION REFILL: ICD-10-CM

## 2018-11-29 RX ORDER — POTASSIUM CHLORIDE 20 MEQ/1
TABLET, EXTENDED RELEASE ORAL
Qty: 30 TABLET | Refills: 0 | Status: SHIPPED | OUTPATIENT
Start: 2018-11-29 | End: 2018-12-22 | Stop reason: SDUPTHER

## 2018-11-29 RX ORDER — AMLODIPINE BESYLATE 5 MG/1
TABLET ORAL
Qty: 30 TABLET | Refills: 0 | Status: SHIPPED | OUTPATIENT
Start: 2018-11-29 | End: 2019-01-03 | Stop reason: SDUPTHER

## 2018-12-03 DIAGNOSIS — M79.89 LEG SWELLING: ICD-10-CM

## 2018-12-03 NOTE — TELEPHONE ENCOUNTER
Requested Prescriptions     Pending Prescriptions Disp Refills    Omega-3 Fatty Acids (FISH OIL) 1200 MG CPDR 90 capsule 0     Sig: Take 1 capsule by mouth daily    chlorthalidone (HYGROTON) 25 MG tablet 30 tablet 2     Sig: Take 1 tablet by mouth daily       Next appt is 12/4/2018  Last appt was 6/8/2018

## 2018-12-04 ENCOUNTER — HOSPITAL ENCOUNTER (OUTPATIENT)
Age: 83
Discharge: HOME | End: 2018-12-06
Payer: MEDICARE

## 2018-12-04 ENCOUNTER — OFFICE VISIT (OUTPATIENT)
Dept: FAMILY MEDICINE CLINIC | Age: 83
End: 2018-12-04
Payer: MEDICARE

## 2018-12-04 VITALS
RESPIRATION RATE: 16 BRPM | BODY MASS INDEX: 30.22 KG/M2 | HEART RATE: 75 BPM | TEMPERATURE: 98.2 F | WEIGHT: 177 LBS | OXYGEN SATURATION: 98 % | DIASTOLIC BLOOD PRESSURE: 70 MMHG | SYSTOLIC BLOOD PRESSURE: 136 MMHG | HEIGHT: 64 IN

## 2018-12-04 DIAGNOSIS — E03.9 ACQUIRED HYPOTHYROIDISM: Chronic | ICD-10-CM

## 2018-12-04 DIAGNOSIS — Z95.0 PACEMAKER: Chronic | ICD-10-CM

## 2018-12-04 DIAGNOSIS — E11.9 DIABETES MELLITUS TYPE 2, DIET-CONTROLLED (HCC): Primary | Chronic | ICD-10-CM

## 2018-12-04 DIAGNOSIS — I10 HTN (HYPERTENSION), BENIGN: Chronic | ICD-10-CM

## 2018-12-04 DIAGNOSIS — Z76.0 MEDICATION REFILL: ICD-10-CM

## 2018-12-04 DIAGNOSIS — E11.9 DIABETES MELLITUS TYPE 2, DIET-CONTROLLED (HCC): Chronic | ICD-10-CM

## 2018-12-04 LAB
ALBUMIN SERPL-MCNC: 4.1 G/DL (ref 3.5–5.2)
ALP BLD-CCNC: 80 U/L (ref 35–104)
ALT SERPL-CCNC: 10 U/L (ref 0–32)
ANION GAP SERPL CALCULATED.3IONS-SCNC: 18 MMOL/L (ref 7–16)
AST SERPL-CCNC: 18 U/L (ref 0–31)
BASOPHILS ABSOLUTE: 0.06 E9/L (ref 0–0.2)
BASOPHILS RELATIVE PERCENT: 0.6 % (ref 0–2)
BILIRUB SERPL-MCNC: 0.3 MG/DL (ref 0–1.2)
BUN BLDV-MCNC: 25 MG/DL (ref 8–23)
CALCIUM SERPL-MCNC: 9.6 MG/DL (ref 8.6–10.2)
CHLORIDE BLD-SCNC: 92 MMOL/L (ref 98–107)
CHOLESTEROL, TOTAL: 146 MG/DL (ref 0–199)
CO2: 22 MMOL/L (ref 22–29)
CREAT SERPL-MCNC: 0.9 MG/DL (ref 0.5–1)
EOSINOPHILS ABSOLUTE: 0.22 E9/L (ref 0.05–0.5)
EOSINOPHILS RELATIVE PERCENT: 2.3 % (ref 0–6)
GFR AFRICAN AMERICAN: >60
GFR NON-AFRICAN AMERICAN: 59 ML/MIN/1.73
GLUCOSE BLD-MCNC: 62 MG/DL (ref 74–99)
HBA1C MFR BLD: 6.4 % (ref 4–5.6)
HCT VFR BLD CALC: 43.5 % (ref 34–48)
HDLC SERPL-MCNC: 50 MG/DL
HEMOGLOBIN: 14.2 G/DL (ref 11.5–15.5)
IMMATURE GRANULOCYTES #: 0.02 E9/L
IMMATURE GRANULOCYTES %: 0.2 % (ref 0–5)
LDL CHOLESTEROL CALCULATED: 68 MG/DL (ref 0–99)
LYMPHOCYTES ABSOLUTE: 3.16 E9/L (ref 1.5–4)
LYMPHOCYTES RELATIVE PERCENT: 32.7 % (ref 20–42)
MCH RBC QN AUTO: 28.6 PG (ref 26–35)
MCHC RBC AUTO-ENTMCNC: 32.6 % (ref 32–34.5)
MCV RBC AUTO: 87.5 FL (ref 80–99.9)
MONOCYTES ABSOLUTE: 1.45 E9/L (ref 0.1–0.95)
MONOCYTES RELATIVE PERCENT: 15 % (ref 2–12)
NEUTROPHILS ABSOLUTE: 4.75 E9/L (ref 1.8–7.3)
NEUTROPHILS RELATIVE PERCENT: 49.2 % (ref 43–80)
PDW BLD-RTO: 14.3 FL (ref 11.5–15)
PLATELET # BLD: 416 E9/L (ref 130–450)
PMV BLD AUTO: 10.5 FL (ref 7–12)
POTASSIUM SERPL-SCNC: 3.7 MMOL/L (ref 3.5–5)
RBC # BLD: 4.97 E12/L (ref 3.5–5.5)
SODIUM BLD-SCNC: 132 MMOL/L (ref 132–146)
TOTAL PROTEIN: 8.4 G/DL (ref 6.4–8.3)
TRIGL SERPL-MCNC: 142 MG/DL (ref 0–149)
TSH SERPL DL<=0.05 MIU/L-ACNC: 1.48 UIU/ML (ref 0.27–4.2)
VLDLC SERPL CALC-MCNC: 28 MG/DL
WBC # BLD: 9.7 E9/L (ref 4.5–11.5)

## 2018-12-04 PROCEDURE — 1090F PRES/ABSN URINE INCON ASSESS: CPT | Performed by: FAMILY MEDICINE

## 2018-12-04 PROCEDURE — 1036F TOBACCO NON-USER: CPT | Performed by: FAMILY MEDICINE

## 2018-12-04 PROCEDURE — 4040F PNEUMOC VAC/ADMIN/RCVD: CPT | Performed by: FAMILY MEDICINE

## 2018-12-04 PROCEDURE — 99213 OFFICE O/P EST LOW 20 MIN: CPT | Performed by: FAMILY MEDICINE

## 2018-12-04 PROCEDURE — G8427 DOCREV CUR MEDS BY ELIG CLIN: HCPCS | Performed by: FAMILY MEDICINE

## 2018-12-04 PROCEDURE — G8399 PT W/DXA RESULTS DOCUMENT: HCPCS | Performed by: FAMILY MEDICINE

## 2018-12-04 PROCEDURE — 1101F PT FALLS ASSESS-DOCD LE1/YR: CPT | Performed by: FAMILY MEDICINE

## 2018-12-04 PROCEDURE — G8417 CALC BMI ABV UP PARAM F/U: HCPCS | Performed by: FAMILY MEDICINE

## 2018-12-04 PROCEDURE — 1123F ACP DISCUSS/DSCN MKR DOCD: CPT | Performed by: FAMILY MEDICINE

## 2018-12-04 PROCEDURE — G8484 FLU IMMUNIZE NO ADMIN: HCPCS | Performed by: FAMILY MEDICINE

## 2018-12-04 PROCEDURE — 84443 ASSAY THYROID STIM HORMONE: CPT

## 2018-12-04 PROCEDURE — 80061 LIPID PANEL: CPT

## 2018-12-04 PROCEDURE — 36415 COLL VENOUS BLD VENIPUNCTURE: CPT | Performed by: FAMILY MEDICINE

## 2018-12-04 PROCEDURE — 83036 HEMOGLOBIN GLYCOSYLATED A1C: CPT

## 2018-12-04 PROCEDURE — 85025 COMPLETE CBC W/AUTO DIFF WBC: CPT

## 2018-12-04 PROCEDURE — 80053 COMPREHEN METABOLIC PANEL: CPT

## 2018-12-04 RX ORDER — MULTIVIT-MIN/IRON/FOLIC ACID/K 18-600-40
2000 CAPSULE ORAL DAILY
Qty: 30 CAPSULE | Refills: 3 | Status: SHIPPED | OUTPATIENT
Start: 2018-12-04 | End: 2019-01-03 | Stop reason: SDUPTHER

## 2018-12-04 RX ORDER — LEVOTHYROXINE SODIUM 0.1 MG/1
100 TABLET ORAL DAILY
Qty: 30 TABLET | Refills: 2 | Status: SHIPPED | OUTPATIENT
Start: 2018-12-04 | End: 2019-05-24 | Stop reason: SDUPTHER

## 2018-12-04 RX ORDER — CHLORTHALIDONE 25 MG/1
25 TABLET ORAL DAILY
Qty: 30 TABLET | Refills: 2 | Status: SHIPPED | OUTPATIENT
Start: 2018-12-04 | End: 2019-03-07 | Stop reason: SDUPTHER

## 2018-12-04 ASSESSMENT — ENCOUNTER SYMPTOMS
BACK PAIN: 1
CONSTIPATION: 0

## 2018-12-04 ASSESSMENT — PATIENT HEALTH QUESTIONNAIRE - PHQ9
SUM OF ALL RESPONSES TO PHQ QUESTIONS 1-9: 2
SUM OF ALL RESPONSES TO PHQ9 QUESTIONS 1 & 2: 2
1. LITTLE INTEREST OR PLEASURE IN DOING THINGS: 1
2. FEELING DOWN, DEPRESSED OR HOPELESS: 1
SUM OF ALL RESPONSES TO PHQ QUESTIONS 1-9: 2

## 2018-12-04 NOTE — PROGRESS NOTES
of spine)     cervical spine    Obesity      Past Surgical History:   Procedure Laterality Date    A-V CARDIAC PACEMAKER INSERTION Left 06/13/2016    Dual chamber pacemaker [address] by Dr Sylvain Posada         Past Family Hx:  Reviewed with patient  No family history on file. Social Hx:  Reviewed with patient  Social History   Substance Use Topics    Smoking status: Never Smoker    Smokeless tobacco: Never Used    Alcohol use No      Comment:         Immunization History   Administered Date(s) Administered    Influenza Vaccine, unspecified formulation 10/14/2015    Influenza, High Dose (Fluzone 65 yrs and older) 11/11/2016    Pneumococcal 13-valent Conjugate ([id number]) 12/29/2015    Pneumococcal Polysaccharide ([id number]) 01/12/2017    Tdap (Boostrix, Adacel) 05/20/2016       Review of Systems  Review of Systems   Eyes:        Vision changes   Cardiovascular: Positive for chest pain and leg swelling. Gastrointestinal: Negative for constipation. Musculoskeletal: Positive for back pain (occasional). no falls    PE:  VS:  /70   Pulse 75   Temp 98.2 °F (36.8 °C) (Oral)   Resp 16   Ht 5' 4\" (1.626 m)   Wt 177 lb (80.3 kg)   SpO2 98%   BMI 30.38 kg/m²   Physical Exam   Constitutional: She is oriented to person, place, and time. She appears well-developed and well-nourished. HENT:   Head: Normocephalic and atraumatic. Cardiovascular: Normal rate and regular rhythm. Exam reveals gallop. Exam reveals no friction rub. No murmur heard. Paced rhythm   Pulmonary/Chest: Effort normal and breath sounds normal. She has no wheezes. She has no rales. Musculoskeletal: She exhibits edema (wearing compression stockings). Neurological: She is alert and oriented to person, place, and time. Skin: Skin is warm and dry. Psychiatric: She has a normal mood and affect. Assessment/Plan:  Donnie was seen today for follow-up.     Diagnoses and all orders for this visit:    Diabetes mellitus type 2, diet-controlled ([facility] [address]  Diet controlled  Repeat lab stoday  -     CBC Auto Differential; Future  -     Comprehensive Metabolic Panel; Future  -     Lipid Panel; Future  -     Hemoglobin A1C; Future    Medication refill  -     Cholecalciferol (VITAMIN D) 2000 units CAPS capsule; Take 2,000 Units by mouth daily    HTN (hypertension), benign  At goal  Cont current medications  Note mild hyponatremia on labs - repeat today, may need reduction in thiazide  -     CBC Auto Differential; Future  -     Comprehensive Metabolic Panel; Future    Pacemaker  F/u with cardiologist.    Acquired hypothyroidism  Take levothyroxine on empty stomahc  -     Comprehensive Metabolic Panel; Future  -     TSH without Reflex; Future  -     levothyroxine (SYNTHROID) 100 MCG tablet; Take 1 tablet by mouth daily On an empty stomach 30 minutes prior to eating or other medications      Return in about 3 months (around 3/4/2019) for routine followup. Advised patient to call with any new medication issues. Allquestions answered.   Call or go to ED immediately if symptoms worsen or persist.

## 2018-12-24 RX ORDER — POTASSIUM CHLORIDE 20 MEQ/1
TABLET, EXTENDED RELEASE ORAL
Qty: 30 TABLET | Refills: 0 | Status: SHIPPED | OUTPATIENT
Start: 2018-12-24 | End: 2019-02-23 | Stop reason: SDUPTHER

## 2019-01-03 DIAGNOSIS — Z76.0 MEDICATION REFILL: ICD-10-CM

## 2019-01-03 RX ORDER — SIMVASTATIN 10 MG
10 TABLET ORAL NIGHTLY
Qty: 30 TABLET | Refills: 3 | Status: SHIPPED | OUTPATIENT
Start: 2019-01-03 | End: 2019-04-23 | Stop reason: SDUPTHER

## 2019-01-03 RX ORDER — MULTIVIT-MIN/IRON/FOLIC ACID/K 18-600-40
2000 CAPSULE ORAL DAILY
Qty: 30 CAPSULE | Refills: 3 | Status: SHIPPED | OUTPATIENT
Start: 2019-01-03 | End: 2019-05-24 | Stop reason: SDUPTHER

## 2019-01-03 RX ORDER — AMLODIPINE BESYLATE 5 MG/1
TABLET ORAL
Qty: 30 TABLET | Refills: 3 | Status: SHIPPED | OUTPATIENT
Start: 2019-01-03 | End: 2019-05-28 | Stop reason: SDUPTHER

## 2019-01-17 ENCOUNTER — TELEPHONE (OUTPATIENT)
Dept: FAMILY MEDICINE CLINIC | Age: 84
End: 2019-01-17

## 2019-01-17 RX ORDER — ACETAMINOPHEN 500 MG
500 TABLET ORAL 4 TIMES DAILY PRN
Qty: 120 TABLET | Refills: 5 | Status: SHIPPED | OUTPATIENT
Start: 2019-01-17 | End: 2019-01-30 | Stop reason: SDUPTHER

## 2019-01-30 DIAGNOSIS — Z76.0 MEDICATION REFILL: ICD-10-CM

## 2019-01-31 RX ORDER — ACETAMINOPHEN 500 MG
500 TABLET ORAL 4 TIMES DAILY PRN
Qty: 120 TABLET | Refills: 5 | Status: SHIPPED | OUTPATIENT
Start: 2019-01-31 | End: 2019-09-23 | Stop reason: SDUPTHER

## 2019-01-31 RX ORDER — ASPIRIN 81 MG/1
81 TABLET ORAL DAILY
Qty: 30 TABLET | Refills: 3 | Status: SHIPPED | OUTPATIENT
Start: 2019-01-31 | End: 2019-05-09 | Stop reason: SDUPTHER

## 2019-02-22 DIAGNOSIS — Z76.0 MEDICATION REFILL: ICD-10-CM

## 2019-02-22 RX ORDER — LANOLIN ALCOHOL/MO/W.PET/CERES
CREAM (GRAM) TOPICAL
Qty: 30 TABLET | Refills: 5 | Status: SHIPPED | OUTPATIENT
Start: 2019-02-22 | End: 2019-07-30 | Stop reason: SDUPTHER

## 2019-02-26 RX ORDER — POTASSIUM CHLORIDE 20 MEQ/1
TABLET, EXTENDED RELEASE ORAL
Qty: 30 TABLET | Refills: 0 | Status: SHIPPED | OUTPATIENT
Start: 2019-02-26 | End: 2019-04-02 | Stop reason: SDUPTHER

## 2019-03-07 ENCOUNTER — HOSPITAL ENCOUNTER (OUTPATIENT)
Age: 84
Discharge: HOME | End: 2019-03-09
Payer: COMMERCIAL

## 2019-03-07 ENCOUNTER — OFFICE VISIT (OUTPATIENT)
Dept: FAMILY MEDICINE CLINIC | Age: 84
End: 2019-03-07
Payer: COMMERCIAL

## 2019-03-07 VITALS
HEIGHT: 64 IN | BODY MASS INDEX: 31.07 KG/M2 | SYSTOLIC BLOOD PRESSURE: 116 MMHG | WEIGHT: 182 LBS | OXYGEN SATURATION: 98 % | HEART RATE: 76 BPM | DIASTOLIC BLOOD PRESSURE: 80 MMHG | TEMPERATURE: 98 F | RESPIRATION RATE: 18 BRPM

## 2019-03-07 DIAGNOSIS — F32.89 OTHER DEPRESSION: Primary | ICD-10-CM

## 2019-03-07 DIAGNOSIS — R26.2 AMBULATORY DYSFUNCTION: ICD-10-CM

## 2019-03-07 DIAGNOSIS — Z86.79 HISTORY OF COMPLETE HEART BLOCK: ICD-10-CM

## 2019-03-07 DIAGNOSIS — F32.89 OTHER DEPRESSION: ICD-10-CM

## 2019-03-07 DIAGNOSIS — E11.9 DIABETES MELLITUS TYPE 2, DIET-CONTROLLED (HCC): ICD-10-CM

## 2019-03-07 DIAGNOSIS — R41.3 MEMORY CHANGES: ICD-10-CM

## 2019-03-07 DIAGNOSIS — R05.3 CHRONIC COUGH: ICD-10-CM

## 2019-03-07 PROBLEM — F32.A DEPRESSION: Status: ACTIVE | Noted: 2019-03-07

## 2019-03-07 LAB
ALBUMIN SERPL-MCNC: 4.2 G/DL (ref 3.5–5.2)
ALP BLD-CCNC: 92 U/L (ref 35–104)
ALT SERPL-CCNC: 15 U/L (ref 0–32)
ANION GAP SERPL CALCULATED.3IONS-SCNC: 20 MMOL/L (ref 7–16)
AST SERPL-CCNC: 24 U/L (ref 0–31)
BASOPHILS ABSOLUTE: 0.03 E9/L (ref 0–0.2)
BASOPHILS RELATIVE PERCENT: 0.4 % (ref 0–2)
BILIRUB SERPL-MCNC: 0.5 MG/DL (ref 0–1.2)
BUN BLDV-MCNC: 21 MG/DL (ref 8–23)
CALCIUM SERPL-MCNC: 9.8 MG/DL (ref 8.6–10.2)
CHLORIDE BLD-SCNC: 93 MMOL/L (ref 98–107)
CO2: 23 MMOL/L (ref 22–29)
CREAT SERPL-MCNC: 0.9 MG/DL (ref 0.5–1)
EOSINOPHILS ABSOLUTE: 0.1 E9/L (ref 0.05–0.5)
EOSINOPHILS RELATIVE PERCENT: 1.5 % (ref 0–6)
GFR AFRICAN AMERICAN: >60
GFR NON-AFRICAN AMERICAN: 59 ML/MIN/1.73
GLUCOSE BLD-MCNC: 122 MG/DL (ref 74–99)
HBA1C MFR BLD: 6.1 % (ref 4–5.6)
HCT VFR BLD CALC: 43.9 % (ref 34–48)
HEMOGLOBIN: 14.6 G/DL (ref 11.5–15.5)
IMMATURE GRANULOCYTES #: 0.02 E9/L
IMMATURE GRANULOCYTES %: 0.3 % (ref 0–5)
LYMPHOCYTES ABSOLUTE: 2.01 E9/L (ref 1.5–4)
LYMPHOCYTES RELATIVE PERCENT: 29.5 % (ref 20–42)
MCH RBC QN AUTO: 29.3 PG (ref 26–35)
MCHC RBC AUTO-ENTMCNC: 33.3 % (ref 32–34.5)
MCV RBC AUTO: 88.2 FL (ref 80–99.9)
MONOCYTES ABSOLUTE: 0.77 E9/L (ref 0.1–0.95)
MONOCYTES RELATIVE PERCENT: 11.3 % (ref 2–12)
NEUTROPHILS ABSOLUTE: 3.89 E9/L (ref 1.8–7.3)
NEUTROPHILS RELATIVE PERCENT: 57 % (ref 43–80)
PDW BLD-RTO: 13.9 FL (ref 11.5–15)
PLATELET # BLD: 316 E9/L (ref 130–450)
PMV BLD AUTO: 10.8 FL (ref 7–12)
POTASSIUM SERPL-SCNC: 3.4 MMOL/L (ref 3.5–5)
RBC # BLD: 4.98 E12/L (ref 3.5–5.5)
SODIUM BLD-SCNC: 136 MMOL/L (ref 132–146)
TOTAL PROTEIN: 8.7 G/DL (ref 6.4–8.3)
TSH SERPL DL<=0.05 MIU/L-ACNC: 1.69 UIU/ML (ref 0.27–4.2)
VITAMIN B-12: 607 PG/ML (ref 211–946)
WBC # BLD: 6.8 E9/L (ref 4.5–11.5)

## 2019-03-07 PROCEDURE — 85025 COMPLETE CBC W/AUTO DIFF WBC: CPT

## 2019-03-07 PROCEDURE — 99214 OFFICE O/P EST MOD 30 MIN: CPT | Performed by: FAMILY MEDICINE

## 2019-03-07 PROCEDURE — G0444 DEPRESSION SCREEN ANNUAL: HCPCS | Performed by: FAMILY MEDICINE

## 2019-03-07 PROCEDURE — 82607 VITAMIN B-12: CPT

## 2019-03-07 PROCEDURE — 80053 COMPREHEN METABOLIC PANEL: CPT

## 2019-03-07 PROCEDURE — 83036 HEMOGLOBIN GLYCOSYLATED A1C: CPT

## 2019-03-07 PROCEDURE — 84443 ASSAY THYROID STIM HORMONE: CPT

## 2019-03-07 RX ORDER — CHLORTHALIDONE 25 MG/1
TABLET ORAL
Qty: 30 TABLET | Refills: 0 | Status: SHIPPED | OUTPATIENT
Start: 2019-03-07 | End: 2019-05-07 | Stop reason: SDUPTHER

## 2019-03-07 RX ORDER — FLUTICASONE PROPIONATE 50 MCG
2 SPRAY, SUSPENSION (ML) NASAL DAILY
Qty: 1 BOTTLE | Refills: 5 | Status: SHIPPED | OUTPATIENT
Start: 2019-03-07 | End: 2019-05-30 | Stop reason: SDUPTHER

## 2019-03-07 ASSESSMENT — PATIENT HEALTH QUESTIONNAIRE - PHQ9
6. FEELING BAD ABOUT YOURSELF - OR THAT YOU ARE A FAILURE OR HAVE LET YOURSELF OR YOUR FAMILY DOWN: 3
4. FEELING TIRED OR HAVING LITTLE ENERGY: 2
7. TROUBLE CONCENTRATING ON THINGS, SUCH AS READING THE NEWSPAPER OR WATCHING TELEVISION: 3
2. FEELING DOWN, DEPRESSED OR HOPELESS: 2
SUM OF ALL RESPONSES TO PHQ9 QUESTIONS 1 & 2: 3
8. MOVING OR SPEAKING SO SLOWLY THAT OTHER PEOPLE COULD HAVE NOTICED. OR THE OPPOSITE, BEING SO FIGETY OR RESTLESS THAT YOU HAVE BEEN MOVING AROUND A LOT MORE THAN USUAL: 3
SUM OF ALL RESPONSES TO PHQ QUESTIONS 1-9: 19
3. TROUBLE FALLING OR STAYING ASLEEP: 2
1. LITTLE INTEREST OR PLEASURE IN DOING THINGS: 1
9. THOUGHTS THAT YOU WOULD BE BETTER OFF DEAD, OR OF HURTING YOURSELF: 1
5. POOR APPETITE OR OVEREATING: 2
SUM OF ALL RESPONSES TO PHQ QUESTIONS 1-9: 19
10. IF YOU CHECKED OFF ANY PROBLEMS, HOW DIFFICULT HAVE THESE PROBLEMS MADE IT FOR YOU TO DO YOUR WORK, TAKE CARE OF THINGS AT HOME, OR GET ALONG WITH OTHER PEOPLE: 1

## 2019-03-07 ASSESSMENT — ENCOUNTER SYMPTOMS
SHORTNESS OF BREATH: 1
COUGH: 1

## 2019-03-12 RX ORDER — OXYBUTYNIN CHLORIDE 5 MG/1
TABLET ORAL
Qty: 30 TABLET | Refills: 3 | Status: SHIPPED | OUTPATIENT
Start: 2019-03-12 | End: 2019-07-05

## 2019-03-21 ENCOUNTER — TELEPHONE (OUTPATIENT)
Dept: FAMILY MEDICINE CLINIC | Age: 84
End: 2019-03-21

## 2019-04-01 ENCOUNTER — NURSE ONLY (OUTPATIENT)
Dept: CARDIOLOGY CLINIC | Age: 84
End: 2019-04-01
Payer: COMMERCIAL

## 2019-04-01 DIAGNOSIS — I44.2 CHB (COMPLETE HEART BLOCK) (HCC): ICD-10-CM

## 2019-04-01 DIAGNOSIS — Z95.0 PACEMAKER: Primary | ICD-10-CM

## 2019-04-01 PROCEDURE — 93280 PM DEVICE PROGR EVAL DUAL: CPT | Performed by: INTERNAL MEDICINE

## 2019-04-02 DIAGNOSIS — E87.1 HYPONATREMIA: Primary | ICD-10-CM

## 2019-04-02 RX ORDER — POTASSIUM CHLORIDE 20 MEQ/1
TABLET, EXTENDED RELEASE ORAL
Qty: 60 TABLET | Refills: 1 | Status: SHIPPED | OUTPATIENT
Start: 2019-04-02 | End: 2019-04-30

## 2019-04-09 RX ORDER — AMOXICILLIN 500 MG
CAPSULE ORAL
Qty: 30 CAPSULE | Refills: 0 | Status: SHIPPED | OUTPATIENT
Start: 2019-04-09 | End: 2019-05-09 | Stop reason: SDUPTHER

## 2019-04-10 RX ORDER — LORATADINE 10 MG/1
TABLET ORAL
Qty: 30 TABLET | Refills: 2 | Status: SHIPPED | OUTPATIENT
Start: 2019-04-10 | End: 2019-06-05 | Stop reason: SDUPTHER

## 2019-04-12 ENCOUNTER — TELEPHONE (OUTPATIENT)
Dept: FAMILY MEDICINE CLINIC | Age: 84
End: 2019-04-12
Payer: COMMERCIAL

## 2019-04-12 DIAGNOSIS — E11.9 DIABETES MELLITUS TYPE 2, DIET-CONTROLLED (HCC): Primary | ICD-10-CM

## 2019-04-12 PROCEDURE — G0179 MD RECERTIFICATION HHA PT: HCPCS | Performed by: FAMILY MEDICINE

## 2019-04-23 DIAGNOSIS — Z76.0 MEDICATION REFILL: ICD-10-CM

## 2019-04-23 RX ORDER — SIMVASTATIN 10 MG
TABLET ORAL
Qty: 30 TABLET | Refills: 0 | Status: SHIPPED | OUTPATIENT
Start: 2019-04-23 | End: 2019-05-21 | Stop reason: SDUPTHER

## 2019-04-24 RX ORDER — CHLORTHALIDONE 25 MG/1
TABLET ORAL
Qty: 30 TABLET | Refills: 0 | Status: SHIPPED | OUTPATIENT
Start: 2019-04-24 | End: 2019-05-07 | Stop reason: SINTOL

## 2019-04-30 ENCOUNTER — OFFICE VISIT (OUTPATIENT)
Dept: FAMILY MEDICINE CLINIC | Age: 84
End: 2019-04-30
Payer: COMMERCIAL

## 2019-04-30 ENCOUNTER — HOSPITAL ENCOUNTER (OUTPATIENT)
Age: 84
Discharge: HOME | End: 2019-04-30
Payer: COMMERCIAL

## 2019-04-30 VITALS
SYSTOLIC BLOOD PRESSURE: 136 MMHG | OXYGEN SATURATION: 98 % | DIASTOLIC BLOOD PRESSURE: 72 MMHG | HEIGHT: 64 IN | RESPIRATION RATE: 18 BRPM | BODY MASS INDEX: 31.24 KG/M2 | TEMPERATURE: 98 F | WEIGHT: 183 LBS | HEART RATE: 68 BPM

## 2019-04-30 DIAGNOSIS — R60.0 BILATERAL LOWER EXTREMITY EDEMA: Primary | ICD-10-CM

## 2019-04-30 DIAGNOSIS — R79.9 ABNORMAL SERUM TOTAL PROTEIN LEVEL: ICD-10-CM

## 2019-04-30 DIAGNOSIS — I44.2 CHB (COMPLETE HEART BLOCK) (HCC): ICD-10-CM

## 2019-04-30 DIAGNOSIS — E87.6 HYPOKALEMIA: ICD-10-CM

## 2019-04-30 DIAGNOSIS — Z87.19 HISTORY OF DENTAL PROBLEMS: ICD-10-CM

## 2019-04-30 DIAGNOSIS — E87.1 HYPONATREMIA: ICD-10-CM

## 2019-04-30 DIAGNOSIS — Z86.59 HISTORY OF DEPRESSION: ICD-10-CM

## 2019-04-30 LAB
ALBUMIN SERPL-MCNC: 4.3 G/DL (ref 3.5–5.2)
ALP BLD-CCNC: 93 U/L (ref 35–104)
ALT SERPL-CCNC: 18 U/L (ref 0–32)
ANION GAP SERPL CALCULATED.3IONS-SCNC: 15 MMOL/L (ref 7–16)
AST SERPL-CCNC: 26 U/L (ref 0–31)
BILIRUB SERPL-MCNC: 0.4 MG/DL (ref 0–1.2)
BUN BLDV-MCNC: 21 MG/DL (ref 8–23)
CALCIUM SERPL-MCNC: 9.4 MG/DL (ref 8.6–10.2)
CHLORIDE BLD-SCNC: 88 MMOL/L (ref 98–107)
CO2: 27 MMOL/L (ref 22–29)
CREAT SERPL-MCNC: 0.9 MG/DL (ref 0.5–1)
GFR AFRICAN AMERICAN: >60
GFR NON-AFRICAN AMERICAN: 59 ML/MIN/1.73
GLUCOSE BLD-MCNC: 71 MG/DL (ref 74–99)
POTASSIUM SERPL-SCNC: 3.4 MMOL/L (ref 3.5–5)
SODIUM BLD-SCNC: 130 MMOL/L (ref 132–146)

## 2019-04-30 PROCEDURE — 1036F TOBACCO NON-USER: CPT | Performed by: FAMILY MEDICINE

## 2019-04-30 PROCEDURE — G8427 DOCREV CUR MEDS BY ELIG CLIN: HCPCS | Performed by: FAMILY MEDICINE

## 2019-04-30 PROCEDURE — G8417 CALC BMI ABV UP PARAM F/U: HCPCS | Performed by: FAMILY MEDICINE

## 2019-04-30 PROCEDURE — 36415 COLL VENOUS BLD VENIPUNCTURE: CPT

## 2019-04-30 PROCEDURE — 99213 OFFICE O/P EST LOW 20 MIN: CPT | Performed by: FAMILY MEDICINE

## 2019-04-30 PROCEDURE — 1090F PRES/ABSN URINE INCON ASSESS: CPT | Performed by: FAMILY MEDICINE

## 2019-04-30 PROCEDURE — 80053 COMPREHEN METABOLIC PANEL: CPT

## 2019-04-30 PROCEDURE — 84165 PROTEIN E-PHORESIS SERUM: CPT

## 2019-04-30 PROCEDURE — 4040F PNEUMOC VAC/ADMIN/RCVD: CPT | Performed by: FAMILY MEDICINE

## 2019-04-30 PROCEDURE — 1123F ACP DISCUSS/DSCN MKR DOCD: CPT | Performed by: FAMILY MEDICINE

## 2019-04-30 RX ORDER — POTASSIUM CHLORIDE 750 MG/1
TABLET, EXTENDED RELEASE ORAL
Qty: 130 TABLET | Refills: 2 | Status: SHIPPED | OUTPATIENT
Start: 2019-04-30 | End: 2019-05-24

## 2019-04-30 RX ORDER — FUROSEMIDE 20 MG/1
20 TABLET ORAL 2 TIMES DAILY
Qty: 20 TABLET | Refills: 0 | Status: SHIPPED | OUTPATIENT
Start: 2019-04-30 | End: 2019-05-17 | Stop reason: SDUPTHER

## 2019-04-30 SDOH — HEALTH STABILITY: MENTAL HEALTH: HOW OFTEN DO YOU HAVE A DRINK CONTAINING ALCOHOL?: NEVER

## 2019-04-30 ASSESSMENT — PATIENT HEALTH QUESTIONNAIRE - PHQ9
2. FEELING DOWN, DEPRESSED OR HOPELESS: 1
SUM OF ALL RESPONSES TO PHQ QUESTIONS 1-9: 2
SUM OF ALL RESPONSES TO PHQ QUESTIONS 1-9: 2
1. LITTLE INTEREST OR PLEASURE IN DOING THINGS: 1
SUM OF ALL RESPONSES TO PHQ9 QUESTIONS 1 & 2: 2

## 2019-04-30 NOTE — PROGRESS NOTES
0    simvastatin (ZOCOR) 10 MG tablet TAKE ONE TABLET BY MOUTH AT BEDTIME. 30 tablet 0    loratadine (CLARITIN) 10 MG tablet TAKE 1 TABLET BY MOUTH ONCE DAILY 30 tablet 2    Omega-3 Fatty Acids (FISH OIL) 1200 MG CAPS TAKE 1 CAPSULE BY MOUTH IN THE MORNING. 30 capsule 0    potassium chloride (KLOR-CON M) 20 MEQ extended release tablet TAKE 1 TABLET BY MOUTH twice daily 60 tablet 1    oxybutynin (DITROPAN) 5 MG tablet TAKE ONE TABLET BY MOUTH AT BEDTIME. 30 tablet 3    chlorthalidone (HYGROTON) 25 MG tablet TAKE 1 TABLET BY MOUTH IN  THE MORNING. 30 tablet 0    Misc. Devices (ROLLATOR) MISC Use as directed; dx osteoarthritis, heart failure 1 each 0    fluticasone (FLONASE) 50 MCG/ACT nasal spray 2 sprays by Each Nare route daily 1 Bottle 5    folic acid (FOLVITE) 312 MCG tablet TAKE 1 TABLET BY MOUTH IN  THE MORNING. 30 tablet 5    acetaminophen (TYLENOL) 500 MG tablet Take 1 tablet by mouth 4 times daily as needed for Pain 120 tablet 5    aspirin 81 MG EC tablet Take 1 tablet by mouth daily 30 tablet 3    Cholecalciferol (VITAMIN D) 2000 units CAPS capsule Take 2,000 Units by mouth daily 30 capsule 3    amLODIPine (NORVASC) 5 MG tablet TAKE ONE TABLET BY MOUTH AT BEDTIME. 30 tablet 3    Omega-3 Fatty Acids (FISH OIL) 1200 MG CPDR Take 1 capsule by mouth daily 90 capsule 0    levothyroxine (SYNTHROID) 100 MCG tablet Take 1 tablet by mouth daily On an empty stomach 30 minutes prior to eating or other medications 30 tablet 2    TOPROL XL 50 MG extended release tablet Take 1 tablet by mouth daily 30 tablet 11    vitamin B-12 (CYANOCOBALAMIN) 1000 MCG tablet Take 1 tablet by mouth daily 30 tablet 3    Elastic Bandages & Supports (MEDICAL COMPRESSION STOCKINGS) MISC 1 each by Does not apply route daily as needed (swelling) 1 each 0    Lidocaine 0.5 % GEL Apply 2 oz topically daily as needed (pain) 1 Tube 1     No current facility-administered medications for this visit.          Past Medical/Surgical Hx;  Reviewed with patient         Diagnosis Date    Asthma     Atrioventricular (AV) dissociation     AV block, 1st degree 2/11/2015    Diabetes mellitus ([facility] [address]     GERD (gastroesophageal reflux disease)     Herniated disc     Cervical    Hyperlipidemia     Hypertension     Mild aortic regurgitation 11/5/13    Mitral regurgitation 11/5/13    mild-moderate    Nontoxic uninodular goiter     OA (osteoarthritis of spine)     cervical spine    Obesity      Past Surgical History:   Procedure Laterality Date    A-V CARDIAC PACEMAKER INSERTION Left 06/13/2016    Dual chamber pacemaker [address] by Dr Yi Standing         Past Family Hx:  Reviewed with patient  No family history on file. Social Hx:  Reviewed with patient  Social History     Tobacco Use    Smoking status: Never Smoker    Smokeless tobacco: Never Used   Substance Use Topics    Alcohol use: No     Frequency: Never     Binge frequency: Never     Comment:         Immunization History   Administered Date(s) Administered    Influenza Vaccine, unspecified formulation 10/14/2015    Influenza, High Dose (Fluzone 65 yrs and older) 11/11/2016    Pneumococcal 13-valent Conjugate ([id number]) 12/29/2015    Pneumococcal Polysaccharide ([id number]) 01/12/2017    Tdap (Boostrix, Adacel) 05/20/2016       Review of Systems  Review of Systems    PE:  VS:  /72   Pulse 68   Temp 98 °F (36.7 °C) (Oral)   Resp 18   Ht 5' 4\" (1.626 m)   Wt 183 lb (83 kg)   SpO2 98%   Breastfeeding? No   BMI 31.41 kg/m²   Physical Exam    Assessment/Plan:  Donnie was seen today for edema and pre-op exam.    Diagnoses and all orders for this visit:    Bilateral lower extremity edema  Change to lasix temporarily - add extra potassium when taking   Repeat labs    Hypokalemia  -     Comprehensive Metabolic Panel; Future  -     BASIC METABOLIC PANEL;  Future    Abnormal serum total protein level  Check spep and repeat cmp  -

## 2019-05-01 ENCOUNTER — TELEPHONE (OUTPATIENT)
Dept: FAMILY MEDICINE CLINIC | Age: 84
End: 2019-05-01

## 2019-05-01 LAB
ALBUMIN SERPL-MCNC: 3.5 G/DL (ref 3.5–4.7)
ALPHA-1-GLOBULIN: 0.3 G/DL (ref 0.2–0.4)
ALPHA-2-GLOBULIN: 1 G/FL (ref 0.5–1)
BETA GLOBULIN: 1.2 G/DL (ref 0.8–1.3)
ELECTROPHORESIS: ABNORMAL
GAMMA GLOBULIN: 2.4 G/DL (ref 0.7–1.6)
TOTAL PROTEIN: 8.4 G/DL (ref 6.4–8.3)

## 2019-05-01 NOTE — TELEPHONE ENCOUNTER
Spoke to Bruce got the directions pharmacy called wasn't clear on directions for lasix and potassium. Told the pharmacist per RODOLFO   Lasix is to be taken 1 bid for 3 days then 1 tab once a  day thereafter continually  Also to take Potassium 2 tab bid plus 1 additional while on lasix for 3 days and then resume 2 tabs BID after the three days.

## 2019-05-07 ENCOUNTER — TELEPHONE (OUTPATIENT)
Dept: FAMILY MEDICINE CLINIC | Age: 84
End: 2019-05-07

## 2019-05-08 ENCOUNTER — TELEPHONE (OUTPATIENT)
Dept: FAMILY MEDICINE CLINIC | Age: 84
End: 2019-05-08

## 2019-05-08 NOTE — TELEPHONE ENCOUNTER
Tasha Christian from [employer] called said that they received the prescription for compression stockings, however they need to know what the weight compression should be  Return call at [phone]

## 2019-05-08 NOTE — TELEPHONE ENCOUNTER
- Niece was concerned, said that she spoke with her Maddy Joya. Girma Brent that she it is making her feel uncomfortable, she is having urine leakage since starting the new water pill. Said that she stayed in her room, due to this. Said that her Maddy Joya is very concerned. - I called and spoke with the nurse at [facility] and notified of lab results and drs recommendations. Notes recorded by Brianna Diaz MD on 5/7/2019 at 10:39 AM EDT  Please call patient. Labs show low sodium levels. This is likely from chlorthalidone. Please stop this medication. Will need repeat labs in 1 week.    She may experience some leg swelling - if so, let me know  Please check weights daily.

## 2019-05-17 ENCOUNTER — TELEPHONE (OUTPATIENT)
Dept: FAMILY MEDICINE CLINIC | Age: 84
End: 2019-05-17

## 2019-05-17 DIAGNOSIS — I10 HTN (HYPERTENSION), BENIGN: Primary | ICD-10-CM

## 2019-05-17 DIAGNOSIS — E87.6 HYPOKALEMIA: ICD-10-CM

## 2019-05-17 DIAGNOSIS — E87.1 HYPONATREMIA: ICD-10-CM

## 2019-05-17 RX ORDER — FUROSEMIDE 20 MG/1
TABLET ORAL
Qty: 60 TABLET | Refills: 0 | Status: SHIPPED | OUTPATIENT
Start: 2019-05-17 | End: 2019-05-30

## 2019-05-17 NOTE — TELEPHONE ENCOUNTER
Please call patient - I have not yet received the results of her basic metabolic panel which she was suppoed to have done this week.

## 2019-05-20 NOTE — TELEPHONE ENCOUNTER
Left message for nurse to return call that we have not received the results from [facility] that was to be completed last week. If done fax it to [phone], if not completed can this please be done today and then the result faxed. That I called last week for this.

## 2019-05-21 DIAGNOSIS — E87.6 HYPOKALEMIA: Primary | ICD-10-CM

## 2019-05-21 DIAGNOSIS — Z76.0 MEDICATION REFILL: ICD-10-CM

## 2019-05-21 NOTE — TELEPHONE ENCOUNTER
Maren from [address] [place] [address] returned call they do not have the order, she is placing the order now. I refaxed the order to her attention. She will have this drawn on Wednesday.

## 2019-05-22 LAB
BUN BLDV-MCNC: 19 MG/DL
CALCIUM SERPL-MCNC: 8.9 MG/DL
CHLORIDE BLD-SCNC: 101 MMOL/L
CO2: 26 MMOL/L
CREAT SERPL-MCNC: 0.9 MG/DL
GFR CALCULATED: NORMAL
GLUCOSE BLD-MCNC: 91 MG/DL
POTASSIUM SERPL-SCNC: 4.1 MMOL/L
SODIUM BLD-SCNC: 135 MMOL/L

## 2019-05-22 RX ORDER — SIMVASTATIN 10 MG
TABLET ORAL
Qty: 30 TABLET | Refills: 5 | Status: SHIPPED | OUTPATIENT
Start: 2019-05-22 | End: 2019-12-20

## 2019-05-22 RX ORDER — POTASSIUM CHLORIDE 20 MEQ/1
TABLET, EXTENDED RELEASE ORAL
Qty: 60 TABLET | Refills: 0 | Status: SHIPPED | OUTPATIENT
Start: 2019-05-22 | End: 2019-05-30

## 2019-05-23 NOTE — TELEPHONE ENCOUNTER
Left message for [address] [place] [address]; we never received the results they said was faxed on 5/22/19,  Asking if they can fax them to [phone].

## 2019-05-24 DIAGNOSIS — E03.9 ACQUIRED HYPOTHYROIDISM: Primary | ICD-10-CM

## 2019-05-24 DIAGNOSIS — Z76.0 MEDICATION REFILL: ICD-10-CM

## 2019-05-24 RX ORDER — LEVOTHYROXINE SODIUM 0.1 MG/1
100 TABLET ORAL DAILY
Qty: 30 TABLET | Refills: 5 | Status: SHIPPED | OUTPATIENT
Start: 2019-05-24 | End: 2019-12-18

## 2019-05-24 RX ORDER — MULTIVIT-MIN/IRON/FOLIC ACID/K 18-600-40
2000 CAPSULE ORAL DAILY
Qty: 30 CAPSULE | Refills: 5 | Status: SHIPPED | OUTPATIENT
Start: 2019-05-24 | End: 2019-08-27 | Stop reason: SDUPTHER

## 2019-05-25 NOTE — TELEPHONE ENCOUNTER
Sodium has improved. For now will need to cont off chlorthalidone. I would like to see her to reevaluate her blood pressure and clinical status. Can you see if she can come in Wed?

## 2019-05-28 DIAGNOSIS — Z76.0 MEDICATION REFILL: ICD-10-CM

## 2019-05-28 RX ORDER — AMLODIPINE BESYLATE 5 MG/1
TABLET ORAL
Qty: 30 TABLET | Refills: 2 | Status: SHIPPED | OUTPATIENT
Start: 2019-05-28 | End: 2019-08-02 | Stop reason: SDUPTHER

## 2019-05-29 NOTE — TELEPHONE ENCOUNTER
Nurse at [facility] notified of results, stay off the Chlorthaoidone and scheduled the appt with Xena Adrian on 5/30 @ 10:45 am

## 2019-05-30 ENCOUNTER — OFFICE VISIT (OUTPATIENT)
Dept: FAMILY MEDICINE CLINIC | Age: 84
End: 2019-05-30
Payer: COMMERCIAL

## 2019-05-30 VITALS
HEIGHT: 64 IN | RESPIRATION RATE: 18 BRPM | OXYGEN SATURATION: 98 % | DIASTOLIC BLOOD PRESSURE: 72 MMHG | SYSTOLIC BLOOD PRESSURE: 144 MMHG | HEART RATE: 79 BPM | BODY MASS INDEX: 31.92 KG/M2 | WEIGHT: 187 LBS

## 2019-05-30 DIAGNOSIS — E87.1 HYPONATREMIA: Primary | ICD-10-CM

## 2019-05-30 DIAGNOSIS — Z86.59 HISTORY OF DEPRESSION: ICD-10-CM

## 2019-05-30 PROCEDURE — 99213 OFFICE O/P EST LOW 20 MIN: CPT | Performed by: FAMILY MEDICINE

## 2019-05-30 PROCEDURE — 1036F TOBACCO NON-USER: CPT | Performed by: FAMILY MEDICINE

## 2019-05-30 PROCEDURE — G8417 CALC BMI ABV UP PARAM F/U: HCPCS | Performed by: FAMILY MEDICINE

## 2019-05-30 PROCEDURE — 1090F PRES/ABSN URINE INCON ASSESS: CPT | Performed by: FAMILY MEDICINE

## 2019-05-30 PROCEDURE — 4040F PNEUMOC VAC/ADMIN/RCVD: CPT | Performed by: FAMILY MEDICINE

## 2019-05-30 PROCEDURE — G8428 CUR MEDS NOT DOCUMENT: HCPCS | Performed by: FAMILY MEDICINE

## 2019-05-30 PROCEDURE — 1123F ACP DISCUSS/DSCN MKR DOCD: CPT | Performed by: FAMILY MEDICINE

## 2019-05-30 RX ORDER — HYDROCHLOROTHIAZIDE 12.5 MG/1
12.5 CAPSULE, GELATIN COATED ORAL DAILY
Qty: 30 CAPSULE | Refills: 2 | Status: SHIPPED | OUTPATIENT
Start: 2019-05-30 | End: 2019-08-15 | Stop reason: SDUPTHER

## 2019-05-30 RX ORDER — FLUTICASONE PROPIONATE 50 MCG
2 SPRAY, SUSPENSION (ML) NASAL DAILY
Qty: 1 BOTTLE | Refills: 5 | Status: SHIPPED
Start: 2019-05-30 | End: 2020-07-28

## 2019-05-30 NOTE — PROGRESS NOTES
5/30/2019    Donnie Cody is a 80 y.o. female here for   Chief Complaint   Patient presents with    Hypertension    Edema     bilateral lower legs, denies pain, wearing compression stockings    Medication Reaction     Lasix made patient feel weak     Here for f/u. She had abnormal labs show hyponatremia. Was on chlorthalidone which was stopped. Sodium did improve. On lasix for swelling. She feels like she has had \"big legs my whole life\". No change in weight that she has noticed. She does get weighed at assistive living facility periodically. She did not brin gin a log of her weights today. Note rise in bp. BP Readings from Last 3 Encounters:   05/30/19 (!) 144/72   04/30/19 136/72   03/07/19 116/80   she is off chlorthalidone. Feels weak when taking lasix  Would like to stop taking it if possible. otehrwise no chest pain, dyspnea  Walks with a walker. No recent falls. Wt Readings from Last 3 Encounters:   05/30/19 187 lb (84.8 kg)   04/30/19 183 lb (83 kg)   03/07/19 182 lb (82.6 kg)       Allergies   Allergen Reactions    Flomax [Tamsulosin Hcl] Other (See Comments)     Causes chest pain    Ace Inhibitors Swelling     Causes lip swelling    Alendronate Sodium Other (See Comments)     Pt unsure of allergies    Lasix [Furosemide] Other (See Comments)     weakness    Lisinopril Rash    Penicillins Rash     Rash    Vioxx [Rofecoxib] Other (See Comments)       Medications  Current Outpatient Medications   Medication Sig Dispense Refill    amLODIPine (NORVASC) 5 MG tablet TAKE ONE TABLET BY MOUTH AT BEDTIME. 30 tablet 2    Cholecalciferol (VITAMIN D) 2000 units CAPS capsule Take 2,000 Units by mouth daily 30 capsule 5    levothyroxine (SYNTHROID) 100 MCG tablet Take 1 tablet by mouth daily On an empty stomach 30 minutes prior to eating or other medications 30 tablet 5    simvastatin (ZOCOR) 10 MG tablet TAKE ONE TABLET BY MOUTH AT BEDTIME.  30 tablet 5    potassium chloride (KLOR-CON  A-V CARDIAC PACEMAKER INSERTION Left 06/13/2016    Dual chamber pacemaker [address] by Dr Miranda Marie         Past Family Hx:  Reviewed with patient  No family history on file. Social Hx:  Reviewed with patient  Social History     Tobacco Use    Smoking status: Never Smoker    Smokeless tobacco: Never Used   Substance Use Topics    Alcohol use: No     Frequency: Never     Binge frequency: Never     Comment:         Immunization History   Administered Date(s) Administered    Influenza Vaccine, unspecified formulation 10/14/2015    Influenza, High Dose (Fluzone 65 yrs and older) 11/11/2016    Pneumococcal 13-valent Conjugate ([id number]) 12/29/2015    Pneumococcal Polysaccharide (Xesdqzhgn95) 01/12/2017    Tdap (Boostrix, Adacel) 05/20/2016       Review of Systems  Review of Systems  As per HPI    PE:  VS:  BP (!) 144/72   Pulse 79   Resp 18   Ht 5' 4\" (1.626 m)   Wt 187 lb (84.8 kg)   SpO2 98%   Breastfeeding? No   BMI 32.10 kg/m²   Physical Exam   Constitutional: She is oriented to person, place, and time. She appears well-developed and well-nourished. HENT:   Head: Normocephalic and atraumatic. Cardiovascular: Normal rate and regular rhythm. Exam reveals gallop. Exam reveals no friction rub. No murmur heard. Pulmonary/Chest: Effort normal. She has no wheezes. She has no rales. Few bibasilar rales     Musculoskeletal: She exhibits edema. Neurological: She is alert and oriented to person, place, and time. Skin: Skin is warm and dry. Assessment/Plan:  Donnie was seen today for hypertension, edema and medication reaction. Diagnoses and all orders for this visit:    Hyponatremia  -     BASIC METABOLIC PANEL; Future    Other orders  -     hydrochlorothiazide (MICROZIDE) 12.5 MG capsule;  Take 1 capsule by mouth daily  -     fluticasone (FLONASE) 50 MCG/ACT nasal spray; 2 sprays by Each Nostril route daily    HTN/ hyponatremia  Sodium normal off

## 2019-06-05 DIAGNOSIS — I10 HTN (HYPERTENSION), BENIGN: ICD-10-CM

## 2019-06-05 RX ORDER — FUROSEMIDE 20 MG/1
TABLET ORAL
Qty: 60 TABLET | Refills: 3 | OUTPATIENT
Start: 2019-06-05

## 2019-06-05 RX ORDER — LORATADINE 10 MG/1
TABLET ORAL
Qty: 30 TABLET | Refills: 5 | Status: SHIPPED | OUTPATIENT
Start: 2019-06-05 | End: 2019-08-27

## 2019-06-06 LAB
BUN BLDV-MCNC: 21 MG/DL
CALCIUM SERPL-MCNC: 9.4 MG/DL
CHLORIDE BLD-SCNC: 96 MMOL/L
CO2: 29 MMOL/L
CREAT SERPL-MCNC: 0.9 MG/DL
GFR CALCULATED: NORMAL
GLUCOSE BLD-MCNC: 95 MG/DL
POTASSIUM SERPL-SCNC: 3.3 MMOL/L
SODIUM BLD-SCNC: 133 MMOL/L

## 2019-06-07 DIAGNOSIS — E87.1 HYPONATREMIA: ICD-10-CM

## 2019-06-13 DIAGNOSIS — I10 HTN (HYPERTENSION), BENIGN: Primary | Chronic | ICD-10-CM

## 2019-06-26 RX ORDER — POTASSIUM CHLORIDE 20 MEQ/1
TABLET, EXTENDED RELEASE ORAL
Qty: 60 TABLET | Refills: 0 | Status: SHIPPED | OUTPATIENT
Start: 2019-06-26 | End: 2019-07-05

## 2019-07-01 LAB
BASOPHILS ABSOLUTE: NORMAL /ΜL
BASOPHILS RELATIVE PERCENT: NORMAL %
BUN BLDV-MCNC: 19 MG/DL
CALCIUM SERPL-MCNC: 9.1 MG/DL
CHLORIDE BLD-SCNC: 99 MMOL/L
CO2: NORMAL MMOL/L
CREAT SERPL-MCNC: 0.9 MG/DL
EOSINOPHILS ABSOLUTE: NORMAL /ΜL
EOSINOPHILS RELATIVE PERCENT: NORMAL %
GFR CALCULATED: NORMAL
GLUCOSE BLD-MCNC: 86 MG/DL
HCT VFR BLD CALC: 39.7 % (ref 36–46)
HEMOGLOBIN: 13 G/DL (ref 12–16)
LYMPHOCYTES ABSOLUTE: NORMAL /ΜL
LYMPHOCYTES RELATIVE PERCENT: NORMAL %
MCH RBC QN AUTO: NORMAL PG
MCHC RBC AUTO-ENTMCNC: NORMAL G/DL
MCV RBC AUTO: NORMAL FL
MONOCYTES ABSOLUTE: NORMAL /ΜL
MONOCYTES RELATIVE PERCENT: NORMAL %
NEUTROPHILS ABSOLUTE: NORMAL /ΜL
NEUTROPHILS RELATIVE PERCENT: NORMAL %
PDW BLD-RTO: NORMAL %
PLATELET # BLD: 303 K/ΜL
PMV BLD AUTO: NORMAL FL
POTASSIUM SERPL-SCNC: 3.9 MMOL/L
RBC # BLD: 4.4 10^6/ΜL
SODIUM BLD-SCNC: 134 MMOL/L
WBC # BLD: 6.7 10^3/ML

## 2019-07-05 ENCOUNTER — HOSPITAL ENCOUNTER (OUTPATIENT)
Age: 84
Discharge: HOME | End: 2019-07-05
Payer: COMMERCIAL

## 2019-07-05 ENCOUNTER — OFFICE VISIT (OUTPATIENT)
Dept: FAMILY MEDICINE CLINIC | Age: 84
End: 2019-07-05
Payer: COMMERCIAL

## 2019-07-05 ENCOUNTER — TELEPHONE (OUTPATIENT)
Dept: FAMILY MEDICINE CLINIC | Age: 84
End: 2019-07-05

## 2019-07-05 VITALS
DIASTOLIC BLOOD PRESSURE: 78 MMHG | SYSTOLIC BLOOD PRESSURE: 154 MMHG | RESPIRATION RATE: 18 BRPM | BODY MASS INDEX: 30.74 KG/M2 | WEIGHT: 180.06 LBS | HEART RATE: 60 BPM | HEIGHT: 64 IN | OXYGEN SATURATION: 94 %

## 2019-07-05 DIAGNOSIS — I10 HTN (HYPERTENSION), BENIGN: Primary | ICD-10-CM

## 2019-07-05 DIAGNOSIS — E87.1 HYPONATREMIA: ICD-10-CM

## 2019-07-05 DIAGNOSIS — E87.6 HYPOKALEMIA: ICD-10-CM

## 2019-07-05 DIAGNOSIS — R32 URINARY INCONTINENCE, UNSPECIFIED TYPE: ICD-10-CM

## 2019-07-05 LAB
ANION GAP SERPL CALCULATED.3IONS-SCNC: 12 MMOL/L (ref 7–16)
BUN BLDV-MCNC: 16 MG/DL (ref 8–23)
CALCIUM SERPL-MCNC: 9.4 MG/DL (ref 8.6–10.2)
CHLORIDE BLD-SCNC: 91 MMOL/L (ref 98–107)
CO2: 28 MMOL/L (ref 22–29)
CREAT SERPL-MCNC: 1 MG/DL (ref 0.5–1)
GFR AFRICAN AMERICAN: >60
GFR NON-AFRICAN AMERICAN: 53 ML/MIN/1.73
GLUCOSE BLD-MCNC: 93 MG/DL (ref 74–99)
POTASSIUM SERPL-SCNC: 3.7 MMOL/L (ref 3.5–5)
SODIUM BLD-SCNC: 131 MMOL/L (ref 132–146)

## 2019-07-05 PROCEDURE — 1123F ACP DISCUSS/DSCN MKR DOCD: CPT | Performed by: FAMILY MEDICINE

## 2019-07-05 PROCEDURE — 0509F URINE INCON PLAN DOCD: CPT | Performed by: FAMILY MEDICINE

## 2019-07-05 PROCEDURE — 36415 COLL VENOUS BLD VENIPUNCTURE: CPT

## 2019-07-05 PROCEDURE — G8427 DOCREV CUR MEDS BY ELIG CLIN: HCPCS | Performed by: FAMILY MEDICINE

## 2019-07-05 PROCEDURE — 80048 BASIC METABOLIC PNL TOTAL CA: CPT

## 2019-07-05 PROCEDURE — 1036F TOBACCO NON-USER: CPT | Performed by: FAMILY MEDICINE

## 2019-07-05 PROCEDURE — 1090F PRES/ABSN URINE INCON ASSESS: CPT | Performed by: FAMILY MEDICINE

## 2019-07-05 PROCEDURE — G8417 CALC BMI ABV UP PARAM F/U: HCPCS | Performed by: FAMILY MEDICINE

## 2019-07-05 PROCEDURE — 99213 OFFICE O/P EST LOW 20 MIN: CPT | Performed by: FAMILY MEDICINE

## 2019-07-05 PROCEDURE — 4040F PNEUMOC VAC/ADMIN/RCVD: CPT | Performed by: FAMILY MEDICINE

## 2019-07-05 RX ORDER — AMOXICILLIN 500 MG
CAPSULE ORAL
Qty: 30 CAPSULE | Refills: 0 | Status: SHIPPED | OUTPATIENT
Start: 2019-07-05 | End: 2019-07-06 | Stop reason: SDUPTHER

## 2019-07-05 RX ORDER — SPIRONOLACTONE 25 MG/1
25 TABLET ORAL DAILY
Qty: 30 TABLET | Refills: 0 | Status: SHIPPED | OUTPATIENT
Start: 2019-07-05 | End: 2019-07-06 | Stop reason: SDUPTHER

## 2019-07-05 RX ORDER — OXYBUTYNIN CHLORIDE 5 MG/1
TABLET ORAL
Qty: 60 TABLET | Refills: 2 | Status: SHIPPED | OUTPATIENT
Start: 2019-07-05 | End: 2019-07-12

## 2019-07-05 NOTE — PROGRESS NOTES
7/5/2019    Donnie Pascal is a 80 y.o. female here for   Chief Complaint   Patient presents with    Hypertension    Leg Swelling     no improvment     Here for f/u. We have been working on adjusting her bp medication and diuretics. She has had problematic hyponatremia and hypokalemia as well as leg swelling and symptoms of congestive heart failure. Note recent BMP results. She was taken off chlorthalidoen and lasix and given hctz only. She has had no worsening of her leg swelling. They are checking daily weights and they have been steady per patient. She denies worsening shortness of breath. She did have four teeth pulled. She had pain and bleeding but no other complications. She is debating whether or   Regarding hypertension. Patient is not monitoring home blood pressures. Cardiovascular risk factors: advanced age (older than 54 for men, 72 for women), dyslipidemia, hypertension, obesity (BMI >= 30 kg/m2) and sedentary lifestyle. Patient does not smoke. Currently on hctz 12.5 mg daily, amlodipine 5 mg daily. . Taking as prescribed. No adverse effects except prev noted electrolyte abnormalities. Today,  BP: (!) 154/78 and she is asymptomatic. BP Readings from Last 3 Encounters:   07/05/19 (!) 154/78   05/30/19 (!) 144/72   04/30/19 136/72     Patient denies chest pain, diaphoresis, worsening dyspnea,palpitations, headache, vision changes. She is c/o urinary incontinence and urgency  She relates this to her water pills   On ditropan 5 mg nightly.     Wt Readings from Last 3 Encounters:   07/05/19 180 lb 1 oz (81.7 kg)   05/30/19 187 lb (84.8 kg)   04/30/19 183 lb (83 kg)       Allergies   Allergen Reactions    Flomax [Tamsulosin Hcl] Other (See Comments)     Causes chest pain    Ace Inhibitors Swelling     Causes lip swelling    Alendronate Sodium Other (See Comments)     Pt unsure of allergies    Lasix [Furosemide] Other (See Comments)     weakness    Lisinopril Rash    Penicillins Rash

## 2019-07-06 DIAGNOSIS — I10 HTN (HYPERTENSION), BENIGN: Primary | Chronic | ICD-10-CM

## 2019-07-08 ENCOUNTER — TELEPHONE (OUTPATIENT)
Dept: FAMILY MEDICINE CLINIC | Age: 84
End: 2019-07-08

## 2019-07-08 NOTE — TELEPHONE ENCOUNTER
They will call assisted living and make sure they are notified. I called and left message at nurses station stating that potassium was discontinued and for them to return call with any questions.

## 2019-07-09 RX ORDER — SPIRONOLACTONE 25 MG/1
TABLET ORAL
Qty: 30 TABLET | Refills: 0 | Status: SHIPPED | OUTPATIENT
Start: 2019-07-09 | End: 2019-08-02 | Stop reason: SDUPTHER

## 2019-07-09 RX ORDER — AMOXICILLIN 500 MG
CAPSULE ORAL
Qty: 30 CAPSULE | Refills: 0 | Status: SHIPPED | OUTPATIENT
Start: 2019-07-09 | End: 2019-08-09 | Stop reason: SDUPTHER

## 2019-07-12 ENCOUNTER — TELEPHONE (OUTPATIENT)
Dept: FAMILY MEDICINE CLINIC | Age: 84
End: 2019-07-12

## 2019-07-12 DIAGNOSIS — R32 URINARY INCONTINENCE, UNSPECIFIED TYPE: Primary | ICD-10-CM

## 2019-07-12 RX ORDER — OXYBUTYNIN CHLORIDE 5 MG/1
TABLET ORAL
Qty: 90 TABLET | Refills: 0 | Status: SHIPPED | OUTPATIENT
Start: 2019-07-12 | End: 2019-12-17 | Stop reason: SDUPTHER

## 2019-07-15 DIAGNOSIS — Z76.0 MEDICATION REFILL: ICD-10-CM

## 2019-07-16 RX ORDER — ASPIRIN 81 MG/1
TABLET, COATED ORAL
Qty: 30 TABLET | Refills: 5 | Status: SHIPPED | OUTPATIENT
Start: 2019-07-16 | End: 2020-01-14

## 2019-07-19 ENCOUNTER — TELEPHONE (OUTPATIENT)
Dept: FAMILY MEDICINE CLINIC | Age: 84
End: 2019-07-19

## 2019-07-19 DIAGNOSIS — R32 URINARY INCONTINENCE, UNSPECIFIED TYPE: ICD-10-CM

## 2019-07-19 RX ORDER — CIPROFLOXACIN 500 MG/1
500 TABLET, FILM COATED ORAL 2 TIMES DAILY
Qty: 6 TABLET | Refills: 0 | Status: SHIPPED | OUTPATIENT
Start: 2019-07-19 | End: 2019-07-22

## 2019-07-19 NOTE — TELEPHONE ENCOUNTER
cipro ordered for UTI  Note pcn allergy  Called and spoke with Yaa Dougherty, however she is out of town in [facility]. [facility] and left message with nurse that prescription had been sent to pharmacy.

## 2019-07-30 DIAGNOSIS — Z76.0 MEDICATION REFILL: ICD-10-CM

## 2019-07-30 RX ORDER — FOLIC ACID 0.4 MG
TABLET ORAL
Qty: 30 TABLET | Refills: 5 | Status: SHIPPED
Start: 2019-07-30 | End: 2020-03-02

## 2019-08-02 DIAGNOSIS — Z76.0 MEDICATION REFILL: ICD-10-CM

## 2019-08-02 DIAGNOSIS — I10 HTN (HYPERTENSION), BENIGN: Chronic | ICD-10-CM

## 2019-08-02 RX ORDER — SPIRONOLACTONE 25 MG/1
TABLET ORAL
Qty: 30 TABLET | Refills: 2 | Status: SHIPPED | OUTPATIENT
Start: 2019-08-02 | End: 2019-11-25 | Stop reason: SDUPTHER

## 2019-08-02 RX ORDER — AMLODIPINE BESYLATE 5 MG/1
TABLET ORAL
Qty: 30 TABLET | Refills: 2 | Status: SHIPPED | OUTPATIENT
Start: 2019-08-02 | End: 2019-11-22 | Stop reason: SDUPTHER

## 2019-08-09 DIAGNOSIS — I10 HTN (HYPERTENSION), BENIGN: Chronic | ICD-10-CM

## 2019-08-15 ENCOUNTER — TELEPHONE (OUTPATIENT)
Dept: FAMILY MEDICINE CLINIC | Age: 84
End: 2019-08-15

## 2019-08-15 DIAGNOSIS — R32 URINARY INCONTINENCE, UNSPECIFIED TYPE: ICD-10-CM

## 2019-08-15 DIAGNOSIS — I10 HTN (HYPERTENSION), BENIGN: Primary | Chronic | ICD-10-CM

## 2019-08-16 RX ORDER — HYDROCHLOROTHIAZIDE 12.5 MG/1
CAPSULE, GELATIN COATED ORAL
Qty: 30 CAPSULE | Refills: 1 | Status: SHIPPED | OUTPATIENT
Start: 2019-08-16 | End: 2019-09-30 | Stop reason: SDUPTHER

## 2019-08-16 RX ORDER — OXYBUTYNIN CHLORIDE 5 MG/1
10 TABLET ORAL DAILY
Qty: 60 TABLET | Refills: 1 | Status: SHIPPED | OUTPATIENT
Start: 2019-08-16 | End: 2019-08-16

## 2019-08-16 RX ORDER — AMOXICILLIN 500 MG
CAPSULE ORAL
Qty: 30 CAPSULE | Refills: 11 | Status: SHIPPED
Start: 2019-08-16 | End: 2020-08-05 | Stop reason: SDUPTHER

## 2019-08-16 NOTE — TELEPHONE ENCOUNTER
I called again, left message for nurse to return call with any other symptoms patient is having, besides diarrhea  - please advise

## 2019-08-23 ENCOUNTER — OFFICE VISIT (OUTPATIENT)
Dept: FAMILY MEDICINE CLINIC | Age: 84
End: 2019-08-23
Payer: COMMERCIAL

## 2019-08-23 VITALS
WEIGHT: 176 LBS | HEART RATE: 63 BPM | DIASTOLIC BLOOD PRESSURE: 76 MMHG | BODY MASS INDEX: 30.21 KG/M2 | SYSTOLIC BLOOD PRESSURE: 141 MMHG

## 2019-08-23 DIAGNOSIS — I10 HTN (HYPERTENSION), BENIGN: Primary | ICD-10-CM

## 2019-08-23 PROCEDURE — 99213 OFFICE O/P EST LOW 20 MIN: CPT | Performed by: FAMILY MEDICINE

## 2019-08-23 PROCEDURE — 1123F ACP DISCUSS/DSCN MKR DOCD: CPT | Performed by: FAMILY MEDICINE

## 2019-08-23 PROCEDURE — 4040F PNEUMOC VAC/ADMIN/RCVD: CPT | Performed by: FAMILY MEDICINE

## 2019-08-23 PROCEDURE — G8427 DOCREV CUR MEDS BY ELIG CLIN: HCPCS | Performed by: FAMILY MEDICINE

## 2019-08-23 PROCEDURE — G8417 CALC BMI ABV UP PARAM F/U: HCPCS | Performed by: FAMILY MEDICINE

## 2019-08-23 PROCEDURE — 1090F PRES/ABSN URINE INCON ASSESS: CPT | Performed by: FAMILY MEDICINE

## 2019-08-23 PROCEDURE — 1036F TOBACCO NON-USER: CPT | Performed by: FAMILY MEDICINE

## 2019-08-23 ASSESSMENT — ENCOUNTER SYMPTOMS
WHEEZING: 0
COUGH: 0

## 2019-08-23 NOTE — PROGRESS NOTES
(NORVASC) 5 MG tablet TAKE ONE TABLET BY MOUTH AT BEDTIME. 30 tablet 2    GNP FOLIC ACID 241 MCG tablet TAKE 1 TABLET BY MOUTH IN  THE MORNING. 30 tablet 5    ASPIRIN LOW DOSE 81 MG EC tablet TAKE 1 TABLET BY MOUTH ONCE DAILY 30 tablet 5    oxybutynin (DITROPAN) 5 MG tablet Take 2 tablets (10mg) in the morning and 5 mg in the evening 90 tablet 0    calcium carbonate-vitamin D (CALTRATE) 600-400 MG-UNIT TABS per tab TAKE 1 TABLET BY MOUTH TWICE A DAY. 60 tablet 0    CLEAR-ATADINE 10 MG tablet TAKE 1 TABLET BY MOUTH ONCE DAILY 30 tablet 5    fluticasone (FLONASE) 50 MCG/ACT nasal spray 2 sprays by Each Nostril route daily 1 Bottle 5    Cholecalciferol (VITAMIN D) 2000 units CAPS capsule Take 2,000 Units by mouth daily 30 capsule 5    levothyroxine (SYNTHROID) 100 MCG tablet Take 1 tablet by mouth daily On an empty stomach 30 minutes prior to eating or other medications 30 tablet 5    simvastatin (ZOCOR) 10 MG tablet TAKE ONE TABLET BY MOUTH AT BEDTIME. 30 tablet 5    acetaminophen (TYLENOL) 500 MG tablet Take 1 tablet by mouth 4 times daily as needed for Pain 120 tablet 5    TOPROL XL 50 MG extended release tablet Take 1 tablet by mouth daily 30 tablet 11    vitamin B-12 (CYANOCOBALAMIN) 1000 MCG tablet Take 1 tablet by mouth daily 30 tablet 3    Lidocaine 0.5 % GEL Apply 2 oz topically daily as needed (pain) 1 Tube 1     No current facility-administered medications for this visit.          Past Medical/Surgical Hx;  Reviewed with patient         Diagnosis Date    Asthma     Atrioventricular (AV) dissociation     AV block, 1st degree 2/11/2015    Diabetes mellitus ([facility] [address]     GERD (gastroesophageal reflux disease)     Herniated disc     Cervical    Hyperlipidemia     Hypertension     Mild aortic regurgitation 11/5/13    Mitral regurgitation 11/5/13    mild-moderate    Nontoxic uninodular goiter     OA (osteoarthritis of spine)     cervical spine    Obesity      Past Surgical History:   Procedure

## 2019-08-26 DIAGNOSIS — Z76.0 MEDICATION REFILL: Primary | ICD-10-CM

## 2019-08-27 DIAGNOSIS — Z76.0 MEDICATION REFILL: ICD-10-CM

## 2019-08-27 DIAGNOSIS — J45.20 MILD INTERMITTENT ASTHMA WITHOUT COMPLICATION: Primary | ICD-10-CM

## 2019-08-27 RX ORDER — MULTIVIT-MIN/IRON/FOLIC ACID/K 18-600-40
2000 CAPSULE ORAL DAILY
Qty: 30 CAPSULE | Refills: 5 | Status: SHIPPED
Start: 2019-08-27 | End: 2020-07-28

## 2019-08-27 RX ORDER — LORATADINE 10 MG/1
TABLET ORAL
Qty: 30 TABLET | Refills: 5 | Status: SHIPPED
Start: 2019-08-27 | End: 2020-03-19

## 2019-09-17 DIAGNOSIS — E56.9 VITAMIN DEFICIENCY: Primary | ICD-10-CM

## 2019-09-23 DIAGNOSIS — Z76.0 MEDICATION REFILL: Primary | ICD-10-CM

## 2019-09-24 RX ORDER — PSEUDOEPHED/ACETAMINOPH/DIPHEN 30MG-500MG
TABLET ORAL
Qty: 120 TABLET | Refills: 5 | Status: SHIPPED
Start: 2019-09-24 | End: 2020-04-14

## 2019-09-30 DIAGNOSIS — R32 URINARY INCONTINENCE, UNSPECIFIED TYPE: Primary | ICD-10-CM

## 2019-09-30 DIAGNOSIS — I10 HTN (HYPERTENSION), BENIGN: Chronic | ICD-10-CM

## 2019-10-01 RX ORDER — OXYBUTYNIN CHLORIDE 5 MG/1
TABLET ORAL
Qty: 90 TABLET | Refills: 2 | Status: SHIPPED | OUTPATIENT
Start: 2019-10-01 | End: 2019-12-10 | Stop reason: SDUPTHER

## 2019-10-01 RX ORDER — HYDROCHLOROTHIAZIDE 12.5 MG/1
CAPSULE, GELATIN COATED ORAL
Qty: 30 CAPSULE | Refills: 2 | Status: SHIPPED
Start: 2019-10-01 | End: 2020-07-28

## 2019-10-23 RX ORDER — METOPROLOL SUCCINATE 50 MG
50 TABLET, EXTENDED RELEASE 24 HR ORAL DAILY
Qty: 30 TABLET | Refills: 11 | Status: SHIPPED
Start: 2019-10-23 | End: 2020-10-14

## 2019-10-28 ENCOUNTER — OFFICE VISIT (OUTPATIENT)
Dept: CARDIOLOGY CLINIC | Age: 84
End: 2019-10-28
Payer: COMMERCIAL

## 2019-10-28 ENCOUNTER — TELEPHONE (OUTPATIENT)
Dept: CARDIOLOGY CLINIC | Age: 84
End: 2019-10-28

## 2019-10-28 ENCOUNTER — NURSE ONLY (OUTPATIENT)
Dept: CARDIOLOGY CLINIC | Age: 84
End: 2019-10-28
Payer: COMMERCIAL

## 2019-10-28 VITALS
WEIGHT: 178 LBS | BODY MASS INDEX: 27.94 KG/M2 | SYSTOLIC BLOOD PRESSURE: 132 MMHG | RESPIRATION RATE: 20 BRPM | HEIGHT: 67 IN | DIASTOLIC BLOOD PRESSURE: 68 MMHG | HEART RATE: 64 BPM

## 2019-10-28 DIAGNOSIS — Z95.0 PACEMAKER: Primary | ICD-10-CM

## 2019-10-28 DIAGNOSIS — I44.2 CHB (COMPLETE HEART BLOCK) (HCC): ICD-10-CM

## 2019-10-28 DIAGNOSIS — I44.2 CHB (COMPLETE HEART BLOCK) (HCC): Primary | ICD-10-CM

## 2019-10-28 PROCEDURE — 1036F TOBACCO NON-USER: CPT | Performed by: NURSE PRACTITIONER

## 2019-10-28 PROCEDURE — G8417 CALC BMI ABV UP PARAM F/U: HCPCS | Performed by: NURSE PRACTITIONER

## 2019-10-28 PROCEDURE — 99213 OFFICE O/P EST LOW 20 MIN: CPT | Performed by: NURSE PRACTITIONER

## 2019-10-28 PROCEDURE — 1123F ACP DISCUSS/DSCN MKR DOCD: CPT | Performed by: NURSE PRACTITIONER

## 2019-10-28 PROCEDURE — 93280 PM DEVICE PROGR EVAL DUAL: CPT | Performed by: INTERNAL MEDICINE

## 2019-10-28 PROCEDURE — G8484 FLU IMMUNIZE NO ADMIN: HCPCS | Performed by: NURSE PRACTITIONER

## 2019-10-28 PROCEDURE — 93000 ELECTROCARDIOGRAM COMPLETE: CPT | Performed by: INTERNAL MEDICINE

## 2019-10-28 PROCEDURE — 4040F PNEUMOC VAC/ADMIN/RCVD: CPT | Performed by: NURSE PRACTITIONER

## 2019-10-28 PROCEDURE — 1090F PRES/ABSN URINE INCON ASSESS: CPT | Performed by: NURSE PRACTITIONER

## 2019-10-28 PROCEDURE — G8427 DOCREV CUR MEDS BY ELIG CLIN: HCPCS | Performed by: NURSE PRACTITIONER

## 2019-10-28 RX ORDER — METOPROLOL SUCCINATE 25 MG/1
25 TABLET, EXTENDED RELEASE ORAL NIGHTLY
Qty: 30 TABLET | Refills: 3 | Status: SHIPPED | OUTPATIENT
Start: 2019-10-28 | End: 2020-01-31 | Stop reason: SDUPTHER

## 2019-10-29 ENCOUNTER — TELEPHONE (OUTPATIENT)
Dept: CARDIOLOGY CLINIC | Age: 84
End: 2019-10-29

## 2019-11-18 ENCOUNTER — HOSPITAL ENCOUNTER (EMERGENCY)
Age: 84
Discharge: HOME | End: 2019-11-18
Attending: EMERGENCY MEDICINE
Payer: COMMERCIAL

## 2019-11-18 VITALS
TEMPERATURE: 97 F | RESPIRATION RATE: 16 BRPM | SYSTOLIC BLOOD PRESSURE: 154 MMHG | DIASTOLIC BLOOD PRESSURE: 89 MMHG | WEIGHT: 178 LBS | OXYGEN SATURATION: 97 % | BODY MASS INDEX: 27.94 KG/M2 | HEIGHT: 67 IN | HEART RATE: 62 BPM

## 2019-11-18 DIAGNOSIS — E16.2 HYPOGLYCEMIA: Primary | ICD-10-CM

## 2019-11-18 LAB
CHP ED QC CHECK: NORMAL
CHP ED QC CHECK: NORMAL
GLUCOSE BLD-MCNC: 66 MG/DL
GLUCOSE BLD-MCNC: 71 MG/DL
METER GLUCOSE: 66 MG/DL (ref 74–99)
METER GLUCOSE: 71 MG/DL (ref 74–99)
METER GLUCOSE: 76 MG/DL (ref 74–99)

## 2019-11-18 PROCEDURE — 99284 EMERGENCY DEPT VISIT MOD MDM: CPT

## 2019-11-18 PROCEDURE — 82962 GLUCOSE BLOOD TEST: CPT

## 2019-11-18 ASSESSMENT — ENCOUNTER SYMPTOMS
DIARRHEA: 0
BACK PAIN: 0
SINUS PRESSURE: 0
VOMITING: 0
EYE PAIN: 0
EYE REDNESS: 0
WHEEZING: 0
SHORTNESS OF BREATH: 0
ABDOMINAL DISTENTION: 0
COUGH: 0
SORE THROAT: 0
NAUSEA: 0
EYE DISCHARGE: 0

## 2019-11-22 ENCOUNTER — OFFICE VISIT (OUTPATIENT)
Dept: FAMILY MEDICINE CLINIC | Age: 84
End: 2019-11-22
Payer: COMMERCIAL

## 2019-11-22 ENCOUNTER — HOSPITAL ENCOUNTER (OUTPATIENT)
Age: 84
Discharge: HOME | End: 2019-11-22
Payer: COMMERCIAL

## 2019-11-22 VITALS
WEIGHT: 178 LBS | OXYGEN SATURATION: 95 % | RESPIRATION RATE: 18 BRPM | SYSTOLIC BLOOD PRESSURE: 137 MMHG | HEIGHT: 67 IN | DIASTOLIC BLOOD PRESSURE: 66 MMHG | BODY MASS INDEX: 27.94 KG/M2 | HEART RATE: 65 BPM

## 2019-11-22 DIAGNOSIS — R53.83 FATIGUE, UNSPECIFIED TYPE: Primary | ICD-10-CM

## 2019-11-22 DIAGNOSIS — Z76.0 MEDICATION REFILL: ICD-10-CM

## 2019-11-22 DIAGNOSIS — R53.83 FATIGUE, UNSPECIFIED TYPE: ICD-10-CM

## 2019-11-22 DIAGNOSIS — E11.9 DIABETES MELLITUS TYPE 2, DIET-CONTROLLED (HCC): ICD-10-CM

## 2019-11-22 LAB
BASOPHILS ABSOLUTE: 0.05 E9/L (ref 0–0.2)
BASOPHILS RELATIVE PERCENT: 0.7 % (ref 0–2)
EOSINOPHILS ABSOLUTE: 0.18 E9/L (ref 0.05–0.5)
EOSINOPHILS RELATIVE PERCENT: 2.6 % (ref 0–6)
HBA1C MFR BLD: 6.3 % (ref 4–5.6)
HCT VFR BLD CALC: 40.6 % (ref 34–48)
HEMOGLOBIN: 13.5 G/DL (ref 11.5–15.5)
IMMATURE GRANULOCYTES #: 0.02 E9/L
IMMATURE GRANULOCYTES %: 0.3 % (ref 0–5)
LYMPHOCYTES ABSOLUTE: 2.23 E9/L (ref 1.5–4)
LYMPHOCYTES RELATIVE PERCENT: 32.6 % (ref 20–42)
MCH RBC QN AUTO: 28.8 PG (ref 26–35)
MCHC RBC AUTO-ENTMCNC: 33.3 % (ref 32–34.5)
MCV RBC AUTO: 86.8 FL (ref 80–99.9)
MONOCYTES ABSOLUTE: 1.15 E9/L (ref 0.1–0.95)
MONOCYTES RELATIVE PERCENT: 16.8 % (ref 2–12)
NEUTROPHILS ABSOLUTE: 3.21 E9/L (ref 1.8–7.3)
NEUTROPHILS RELATIVE PERCENT: 47 % (ref 43–80)
PDW BLD-RTO: 14.1 FL (ref 11.5–15)
PLATELET # BLD: 338 E9/L (ref 130–450)
PMV BLD AUTO: 9.6 FL (ref 7–12)
RBC # BLD: 4.68 E12/L (ref 3.5–5.5)
TSH SERPL DL<=0.05 MIU/L-ACNC: 3.9 UIU/ML (ref 0.27–4.2)
WBC # BLD: 6.8 E9/L (ref 4.5–11.5)

## 2019-11-22 PROCEDURE — 1123F ACP DISCUSS/DSCN MKR DOCD: CPT | Performed by: FAMILY MEDICINE

## 2019-11-22 PROCEDURE — 1090F PRES/ABSN URINE INCON ASSESS: CPT | Performed by: FAMILY MEDICINE

## 2019-11-22 PROCEDURE — G8417 CALC BMI ABV UP PARAM F/U: HCPCS | Performed by: FAMILY MEDICINE

## 2019-11-22 PROCEDURE — 85025 COMPLETE CBC W/AUTO DIFF WBC: CPT

## 2019-11-22 PROCEDURE — G8484 FLU IMMUNIZE NO ADMIN: HCPCS | Performed by: FAMILY MEDICINE

## 2019-11-22 PROCEDURE — 99213 OFFICE O/P EST LOW 20 MIN: CPT | Performed by: FAMILY MEDICINE

## 2019-11-22 PROCEDURE — 36415 COLL VENOUS BLD VENIPUNCTURE: CPT

## 2019-11-22 PROCEDURE — 84443 ASSAY THYROID STIM HORMONE: CPT

## 2019-11-22 PROCEDURE — G8427 DOCREV CUR MEDS BY ELIG CLIN: HCPCS | Performed by: FAMILY MEDICINE

## 2019-11-22 PROCEDURE — 1036F TOBACCO NON-USER: CPT | Performed by: FAMILY MEDICINE

## 2019-11-22 PROCEDURE — 83036 HEMOGLOBIN GLYCOSYLATED A1C: CPT

## 2019-11-22 PROCEDURE — 4040F PNEUMOC VAC/ADMIN/RCVD: CPT | Performed by: FAMILY MEDICINE

## 2019-11-22 RX ORDER — OXYBUTYNIN CHLORIDE 5 MG/1
5 TABLET ORAL DAILY
COMMUNITY
Start: 2019-11-13 | End: 2019-12-10 | Stop reason: SDUPTHER

## 2019-11-22 RX ORDER — AMLODIPINE BESYLATE 5 MG/1
5 TABLET ORAL DAILY
Qty: 30 TABLET | Refills: 2 | Status: SHIPPED
Start: 2019-11-22 | End: 2020-02-19

## 2019-11-22 RX ORDER — AMLODIPINE BESYLATE 5 MG/1
5 TABLET ORAL DAILY
Qty: 30 TABLET | Refills: 2 | Status: CANCELLED | OUTPATIENT
Start: 2019-11-22

## 2019-11-22 RX ORDER — AMLODIPINE BESYLATE 5 MG/1
TABLET ORAL
Qty: 30 TABLET | Refills: 0 | OUTPATIENT
Start: 2019-11-22

## 2019-11-25 DIAGNOSIS — I10 HTN (HYPERTENSION), BENIGN: Chronic | ICD-10-CM

## 2019-11-26 RX ORDER — SPIRONOLACTONE 25 MG/1
TABLET ORAL
Qty: 30 TABLET | Refills: 1 | Status: SHIPPED | OUTPATIENT
Start: 2019-11-26 | End: 2020-01-24

## 2019-12-04 ENCOUNTER — TELEPHONE (OUTPATIENT)
Dept: FAMILY MEDICINE CLINIC | Age: 84
End: 2019-12-04

## 2019-12-10 ENCOUNTER — OFFICE VISIT (OUTPATIENT)
Dept: FAMILY MEDICINE CLINIC | Age: 84
End: 2019-12-10
Payer: COMMERCIAL

## 2019-12-10 VITALS
HEART RATE: 66 BPM | SYSTOLIC BLOOD PRESSURE: 144 MMHG | BODY MASS INDEX: 28.88 KG/M2 | WEIGHT: 184 LBS | HEIGHT: 67 IN | RESPIRATION RATE: 18 BRPM | OXYGEN SATURATION: 96 % | DIASTOLIC BLOOD PRESSURE: 67 MMHG

## 2019-12-10 DIAGNOSIS — I10 HTN (HYPERTENSION), BENIGN: Primary | Chronic | ICD-10-CM

## 2019-12-10 DIAGNOSIS — E03.9 ACQUIRED HYPOTHYROIDISM: Chronic | ICD-10-CM

## 2019-12-10 DIAGNOSIS — R53.82 CHRONIC FATIGUE: ICD-10-CM

## 2019-12-10 DIAGNOSIS — E11.9 DIABETES MELLITUS TYPE 2, DIET-CONTROLLED (HCC): Chronic | ICD-10-CM

## 2019-12-10 PROCEDURE — G8484 FLU IMMUNIZE NO ADMIN: HCPCS | Performed by: FAMILY MEDICINE

## 2019-12-10 PROCEDURE — 1090F PRES/ABSN URINE INCON ASSESS: CPT | Performed by: FAMILY MEDICINE

## 2019-12-10 PROCEDURE — 99213 OFFICE O/P EST LOW 20 MIN: CPT | Performed by: FAMILY MEDICINE

## 2019-12-10 PROCEDURE — G8417 CALC BMI ABV UP PARAM F/U: HCPCS | Performed by: FAMILY MEDICINE

## 2019-12-10 PROCEDURE — 4040F PNEUMOC VAC/ADMIN/RCVD: CPT | Performed by: FAMILY MEDICINE

## 2019-12-10 PROCEDURE — 1123F ACP DISCUSS/DSCN MKR DOCD: CPT | Performed by: FAMILY MEDICINE

## 2019-12-10 PROCEDURE — G8427 DOCREV CUR MEDS BY ELIG CLIN: HCPCS | Performed by: FAMILY MEDICINE

## 2019-12-10 PROCEDURE — 1036F TOBACCO NON-USER: CPT | Performed by: FAMILY MEDICINE

## 2019-12-10 ASSESSMENT — ENCOUNTER SYMPTOMS
COUGH: 1
WHEEZING: 0
CHEST TIGHTNESS: 0
ABDOMINAL PAIN: 0
SHORTNESS OF BREATH: 0

## 2019-12-17 DIAGNOSIS — Z76.0 MEDICATION REFILL: Primary | ICD-10-CM

## 2019-12-18 DIAGNOSIS — E03.9 ACQUIRED HYPOTHYROIDISM: ICD-10-CM

## 2019-12-18 RX ORDER — LEVOTHYROXINE SODIUM 0.1 MG/1
TABLET ORAL
Qty: 30 TABLET | Refills: 2 | Status: SHIPPED
Start: 2019-12-18 | End: 2020-03-19

## 2019-12-18 RX ORDER — OXYBUTYNIN CHLORIDE 5 MG/1
TABLET ORAL
Qty: 90 TABLET | Refills: 2 | Status: SHIPPED
Start: 2019-12-18 | End: 2020-03-06

## 2019-12-20 DIAGNOSIS — Z76.0 MEDICATION REFILL: ICD-10-CM

## 2019-12-20 RX ORDER — SIMVASTATIN 10 MG
TABLET ORAL
Qty: 30 TABLET | Refills: 5 | Status: SHIPPED
Start: 2019-12-20 | End: 2020-06-19

## 2019-12-21 ENCOUNTER — TELEPHONE (OUTPATIENT)
Dept: FAMILY MEDICINE CLINIC | Age: 84
End: 2019-12-21

## 2019-12-21 DIAGNOSIS — N30.00 ACUTE CYSTITIS WITHOUT HEMATURIA: Primary | ICD-10-CM

## 2019-12-21 RX ORDER — SULFAMETHOXAZOLE AND TRIMETHOPRIM 800; 160 MG/1; MG/1
1 TABLET ORAL 2 TIMES DAILY
Qty: 14 TABLET | Refills: 0 | Status: SHIPPED | OUTPATIENT
Start: 2019-12-21 | End: 2019-12-21 | Stop reason: SINTOL

## 2019-12-21 RX ORDER — CIPROFLOXACIN 500 MG/1
500 TABLET, FILM COATED ORAL 2 TIMES DAILY
Qty: 14 TABLET | Refills: 0 | Status: SHIPPED | OUTPATIENT
Start: 2019-12-21 | End: 2019-12-28

## 2019-12-23 ENCOUNTER — TELEPHONE (OUTPATIENT)
Dept: FAMILY MEDICINE CLINIC | Age: 84
End: 2019-12-23

## 2019-12-31 LAB
BILIRUBIN, URINE: NEGATIVE
BLOOD, URINE: NEGATIVE
CLARITY: CLEAR
COLOR: COLORLESS
GLUCOSE URINE: NORMAL
KETONES, URINE: NEGATIVE
LEUKOCYTE ESTERASE, URINE: NEGATIVE
NITRITE, URINE: NEGATIVE
PH UA: 7 (ref 4.5–8)
PROTEIN UA: NEGATIVE
SPECIFIC GRAVITY, URINE: 1.01
UROBILINOGEN, URINE: NORMAL

## 2020-01-14 ENCOUNTER — TELEPHONE (OUTPATIENT)
Dept: FAMILY MEDICINE CLINIC | Age: 85
End: 2020-01-14

## 2020-01-14 NOTE — TELEPHONE ENCOUNTER
I called [facility], their [facility] Neptali comes this way on Wednesdays. Neither dr has openings on this day. She will have patients niece call to schedule with .

## 2020-01-15 RX ORDER — ASPIRIN 81 MG/1
TABLET ORAL
Qty: 30 TABLET | Refills: 11 | Status: SHIPPED
Start: 2020-01-15 | End: 2020-12-22 | Stop reason: SDUPTHER

## 2020-01-16 ENCOUNTER — TELEPHONE (OUTPATIENT)
Dept: FAMILY MEDICINE CLINIC | Age: 85
End: 2020-01-16

## 2020-01-16 NOTE — PROGRESS NOTES
ACETAMINOPHEN EXTRA STRENGTH 500 MG tablet TAKE 1 TABLET BY MOUTH FOUR TIMES DAILY AS NEEDED FOR PAIN. Yes Trey Piña MD   calcium carbonate-vitamin D (CALTRATE) 600-400 MG-UNIT TABS per tab TAKE 1 TABLET BY MOUTH TWICE A DAY. Yes Trey Piña MD   loratadine (CLARITIN) 10 MG tablet TAKE 1 TABLET BY MOUTH ONCE DAILY. Yes Trey Piña MD   Cholecalciferol (VITAMIN D) 2000 units CAPS capsule Take 2,000 Units by mouth daily Yes Trey Piña MD   Omega-3 Fatty Acids (FISH OIL) 1200 MG CAPS TAKE 1 CAPSULE BY MOUTH IN THE MORNING. Yes Trey Piña MD   GNP FOLIC ACID 628 MCG tablet TAKE 1 TABLET BY MOUTH IN  THE MORNING. Yes Trey Piña MD   fluticasone (FLONASE) 50 MCG/ACT nasal spray 2 sprays by Each Nostril route daily Yes Trey Piña MD   vitamin B-12 (CYANOCOBALAMIN) 1000 MCG tablet Take 1 tablet by mouth daily Yes Trey Piña MD        Social History     Tobacco Use    Smoking status: Never Smoker    Smokeless tobacco: Never Used   Substance Use Topics    Alcohol use: No     Frequency: Never     Binge frequency: Never     Comment:          Vitals:    01/17/20 1102 01/17/20 1146   BP: (!) 147/73 136/72   Pulse: 66    Resp: 18    Temp: 97 °F (36.1 °C)    SpO2: 93%    Weight: 183 lb (83 kg)    Height: 5' 7\" (1.702 m)      Estimated body mass index is 28.66 kg/m² as calculated from the following:    Height as of this encounter: 5' 7\" (1.702 m). Weight as of this encounter: 183 lb (83 kg). Physical Exam  Constitutional:       Appearance: Normal appearance. HENT:      Head: Normocephalic and atraumatic. Cardiovascular:      Rate and Rhythm: Normal rate and regular rhythm. Heart sounds: Murmur present. Pulmonary:      Effort: Pulmonary effort is normal.      Breath sounds: Normal breath sounds. No wheezing or rhonchi. Musculoskeletal:      Comments: Weakness in bilateral lower ext. Pt need help getting up out of chair. Uses both hands to push herself up.   Uses a walker to get around. Neurological:      General: No focal deficit present. Mental Status: She is alert and oriented to person, place, and time. ASSESSMENT/PLAN:  1. Hx of fall  Pt with balance weakness. To PT. She has agreed to this and understands the reason why and its importance. - External Referral To Physical Therapy    2. HTN (hypertension), benign  Will check labs as this has not been done in a bit. Follow up with PCP.  - COMPREHENSIVE METABOLIC PANEL; Future    3. Diabetes mellitus type 2, diet-controlled ([facility] [address]  Pt not on meds, diet controlled. Will check HbA1c.   - HEMOGLOBIN A1C; Future      No follow-ups on file. An electronic signature was used to authenticate this note.     --Aissatou Graham MD on 1/20/2020 at 7:41 AM

## 2020-01-17 ENCOUNTER — OFFICE VISIT (OUTPATIENT)
Dept: FAMILY MEDICINE CLINIC | Age: 85
End: 2020-01-17
Payer: COMMERCIAL

## 2020-01-17 ENCOUNTER — HOSPITAL ENCOUNTER (OUTPATIENT)
Age: 85
Discharge: HOME | End: 2020-01-17
Payer: COMMERCIAL

## 2020-01-17 VITALS
HEART RATE: 66 BPM | RESPIRATION RATE: 18 BRPM | OXYGEN SATURATION: 93 % | SYSTOLIC BLOOD PRESSURE: 136 MMHG | BODY MASS INDEX: 28.72 KG/M2 | TEMPERATURE: 97 F | WEIGHT: 183 LBS | HEIGHT: 67 IN | DIASTOLIC BLOOD PRESSURE: 72 MMHG

## 2020-01-17 LAB
ALBUMIN SERPL-MCNC: 4.3 G/DL (ref 3.5–5.2)
ALP BLD-CCNC: 95 U/L (ref 35–104)
ALT SERPL-CCNC: 15 U/L (ref 0–32)
ANION GAP SERPL CALCULATED.3IONS-SCNC: 12 MMOL/L (ref 7–16)
AST SERPL-CCNC: 21 U/L (ref 0–31)
BILIRUB SERPL-MCNC: 0.6 MG/DL (ref 0–1.2)
BUN BLDV-MCNC: 25 MG/DL (ref 8–23)
CALCIUM SERPL-MCNC: 9.7 MG/DL (ref 8.6–10.2)
CHLORIDE BLD-SCNC: 87 MMOL/L (ref 98–107)
CO2: 26 MMOL/L (ref 22–29)
CREAT SERPL-MCNC: 0.9 MG/DL (ref 0.5–1)
GFR AFRICAN AMERICAN: >60
GFR NON-AFRICAN AMERICAN: 59 ML/MIN/1.73
GLUCOSE BLD-MCNC: 86 MG/DL (ref 74–99)
HBA1C MFR BLD: 6.1 % (ref 4–5.6)
POTASSIUM SERPL-SCNC: 4.3 MMOL/L (ref 3.5–5)
SODIUM BLD-SCNC: 125 MMOL/L (ref 132–146)
TOTAL PROTEIN: 8.5 G/DL (ref 6.4–8.3)

## 2020-01-17 PROCEDURE — 83036 HEMOGLOBIN GLYCOSYLATED A1C: CPT

## 2020-01-17 PROCEDURE — 36415 COLL VENOUS BLD VENIPUNCTURE: CPT

## 2020-01-17 PROCEDURE — G8417 CALC BMI ABV UP PARAM F/U: HCPCS | Performed by: FAMILY MEDICINE

## 2020-01-17 PROCEDURE — 1123F ACP DISCUSS/DSCN MKR DOCD: CPT | Performed by: FAMILY MEDICINE

## 2020-01-17 PROCEDURE — 4040F PNEUMOC VAC/ADMIN/RCVD: CPT | Performed by: FAMILY MEDICINE

## 2020-01-17 PROCEDURE — G8484 FLU IMMUNIZE NO ADMIN: HCPCS | Performed by: FAMILY MEDICINE

## 2020-01-17 PROCEDURE — G8427 DOCREV CUR MEDS BY ELIG CLIN: HCPCS | Performed by: FAMILY MEDICINE

## 2020-01-17 PROCEDURE — 1036F TOBACCO NON-USER: CPT | Performed by: FAMILY MEDICINE

## 2020-01-17 PROCEDURE — 80053 COMPREHEN METABOLIC PANEL: CPT

## 2020-01-17 PROCEDURE — 99213 OFFICE O/P EST LOW 20 MIN: CPT | Performed by: FAMILY MEDICINE

## 2020-01-17 PROCEDURE — 1090F PRES/ABSN URINE INCON ASSESS: CPT | Performed by: FAMILY MEDICINE

## 2020-01-22 ENCOUNTER — TELEPHONE (OUTPATIENT)
Dept: FAMILY MEDICINE CLINIC | Age: 85
End: 2020-01-22

## 2020-01-27 ENCOUNTER — TELEPHONE (OUTPATIENT)
Dept: FAMILY MEDICINE CLINIC | Age: 85
End: 2020-01-27

## 2020-01-28 LAB
BUN BLDV-MCNC: 60 MG/DL
CALCIUM SERPL-MCNC: 9.3 MG/DL
CHLORIDE BLD-SCNC: 112 MMOL/L
CO2: 20 MMOL/L
CREAT SERPL-MCNC: 1.6 MG/DL
GFR CALCULATED: 37
GLUCOSE BLD-MCNC: 97 MG/DL
POTASSIUM SERPL-SCNC: 5.1 MMOL/L
SODIUM BLD-SCNC: 143 MMOL/L

## 2020-01-28 RX ORDER — SPIRONOLACTONE 25 MG/1
TABLET ORAL
Qty: 30 TABLET | Refills: 3 | Status: SHIPPED
Start: 2020-01-28 | End: 2020-05-11

## 2020-01-30 NOTE — TELEPHONE ENCOUNTER
Tried to call [facility] again - went straight to VM and was not able to LM because vm is not set up

## 2020-01-31 ENCOUNTER — TELEPHONE (OUTPATIENT)
Dept: FAMILY MEDICINE CLINIC | Age: 85
End: 2020-01-31

## 2020-01-31 RX ORDER — METOPROLOL SUCCINATE 25 MG/1
TABLET, EXTENDED RELEASE ORAL
Qty: 30 TABLET | Refills: 12 | Status: SHIPPED
Start: 2020-01-31 | End: 2020-07-28

## 2020-01-31 RX ORDER — METOPROLOL SUCCINATE 25 MG/1
25 TABLET, EXTENDED RELEASE ORAL NIGHTLY
Qty: 30 TABLET | Refills: 6 | Status: SHIPPED
Start: 2020-01-31 | End: 2021-02-24

## 2020-01-31 NOTE — TELEPHONE ENCOUNTER
[address] to request BMP results to be drawn on/around 1/22/20. Was informed they would fax ASAP.

## 2020-02-05 ENCOUNTER — TELEPHONE (OUTPATIENT)
Dept: FAMILY MEDICINE CLINIC | Age: 85
End: 2020-02-05
Payer: COMMERCIAL

## 2020-02-05 PROCEDURE — G0179 MD RECERTIFICATION HHA PT: HCPCS | Performed by: FAMILY MEDICINE

## 2020-02-11 ENCOUNTER — TELEPHONE (OUTPATIENT)
Dept: FAMILY MEDICINE CLINIC | Age: 85
End: 2020-02-11

## 2020-02-19 RX ORDER — AMLODIPINE BESYLATE 5 MG/1
TABLET ORAL
Qty: 30 TABLET | Refills: 2 | Status: SHIPPED
Start: 2020-02-19 | End: 2020-05-11 | Stop reason: SDUPTHER

## 2020-03-03 RX ORDER — LANOLIN ALCOHOL/MO/W.PET/CERES
CREAM (GRAM) TOPICAL
Qty: 30 TABLET | Refills: 11 | Status: SHIPPED
Start: 2020-03-03 | End: 2021-03-15 | Stop reason: SDUPTHER

## 2020-03-06 RX ORDER — OXYBUTYNIN CHLORIDE 5 MG/1
TABLET ORAL
Qty: 90 TABLET | Refills: 0 | Status: SHIPPED
Start: 2020-03-06 | End: 2020-05-01

## 2020-03-19 RX ORDER — LEVOTHYROXINE SODIUM 0.1 MG/1
TABLET ORAL
Qty: 30 TABLET | Refills: 3 | Status: SHIPPED
Start: 2020-03-19 | End: 2020-07-06 | Stop reason: SDUPTHER

## 2020-03-19 RX ORDER — LORATADINE 10 MG/1
TABLET ORAL
Qty: 30 TABLET | Refills: 3 | Status: SHIPPED
Start: 2020-03-19 | End: 2020-07-23 | Stop reason: SDUPTHER

## 2020-03-19 RX ORDER — ACETAMINOPHEN 500 MG
TABLET ORAL
Qty: 120 TABLET | Refills: 5 | Status: CANCELLED | OUTPATIENT
Start: 2020-03-19

## 2020-03-20 RX ORDER — CHOLECALCIFEROL (VITAMIN D3) 50 MCG
TABLET ORAL
Qty: 30 TABLET | Refills: 5 | Status: SHIPPED
Start: 2020-03-20 | End: 2020-07-28

## 2020-04-07 LAB
BASOPHILS ABSOLUTE: NORMAL
BASOPHILS RELATIVE PERCENT: NORMAL
BUN BLDV-MCNC: 24 MG/DL
CALCIUM SERPL-MCNC: 9.4 MG/DL
CHLORIDE BLD-SCNC: 92 MMOL/L
CHOLESTEROL, TOTAL: 137 MG/DL
CHOLESTEROL/HDL RATIO: 1.1
CO2: 24 MMOL/L
CREAT SERPL-MCNC: 1 MG/DL
EOSINOPHILS ABSOLUTE: NORMAL
EOSINOPHILS RELATIVE PERCENT: NORMAL
GFR CALCULATED: NORMAL
GLUCOSE BLD-MCNC: 81 MG/DL
HCT VFR BLD CALC: 42.9 % (ref 36–46)
HDLC SERPL-MCNC: 58 MG/DL (ref 35–70)
HEMOGLOBIN: 14 G/DL (ref 12–16)
LDL CHOLESTEROL CALCULATED: 61 MG/DL (ref 0–160)
LYMPHOCYTES ABSOLUTE: NORMAL
LYMPHOCYTES RELATIVE PERCENT: NORMAL
MCH RBC QN AUTO: NORMAL PG
MCHC RBC AUTO-ENTMCNC: NORMAL G/DL
MCV RBC AUTO: NORMAL FL
MONOCYTES ABSOLUTE: NORMAL
MONOCYTES RELATIVE PERCENT: NORMAL
NEUTROPHILS ABSOLUTE: NORMAL
NEUTROPHILS RELATIVE PERCENT: NORMAL
PDW BLD-RTO: NORMAL %
PLATELET # BLD: 314 K/ΜL
PMV BLD AUTO: NORMAL FL
POTASSIUM SERPL-SCNC: 4.7 MMOL/L
RBC # BLD: 4.78 10^6/ΜL
SODIUM BLD-SCNC: 126 MMOL/L
TRIGL SERPL-MCNC: 90 MG/DL
TSH SERPL DL<=0.05 MIU/L-ACNC: NORMAL M[IU]/L
VLDLC SERPL CALC-MCNC: 18 MG/DL
WBC # BLD: 5.1 10^3/ML

## 2020-04-14 RX ORDER — PSEUDOEPHED/ACETAMINOPH/DIPHEN 30MG-500MG
TABLET ORAL
Qty: 120 TABLET | Refills: 0 | Status: SHIPPED
Start: 2020-04-14 | End: 2020-06-01

## 2020-05-01 RX ORDER — OXYBUTYNIN CHLORIDE 5 MG/1
TABLET ORAL
Qty: 90 TABLET | Refills: 0 | Status: SHIPPED
Start: 2020-05-01 | End: 2020-06-02 | Stop reason: SDUPTHER

## 2020-05-06 ENCOUNTER — TELEPHONE (OUTPATIENT)
Dept: FAMILY MEDICINE CLINIC | Age: 85
End: 2020-05-06

## 2020-05-06 VITALS — HEART RATE: 74 BPM | DIASTOLIC BLOOD PRESSURE: 58 MMHG | SYSTOLIC BLOOD PRESSURE: 133 MMHG

## 2020-05-12 RX ORDER — SPIRONOLACTONE 25 MG/1
TABLET ORAL
Qty: 30 TABLET | Refills: 1 | Status: SHIPPED
Start: 2020-05-12 | End: 2020-07-06

## 2020-05-12 RX ORDER — AMLODIPINE BESYLATE 5 MG/1
TABLET ORAL
Qty: 30 TABLET | Refills: 1 | Status: SHIPPED
Start: 2020-05-12 | End: 2020-07-06 | Stop reason: SDUPTHER

## 2020-05-30 ENCOUNTER — TELEPHONE (OUTPATIENT)
Dept: FAMILY MEDICINE CLINIC | Age: 85
End: 2020-05-30

## 2020-05-30 ENCOUNTER — APPOINTMENT (OUTPATIENT)
Dept: CT IMAGING | Age: 85
End: 2020-05-30
Payer: COMMERCIAL

## 2020-05-30 ENCOUNTER — HOSPITAL ENCOUNTER (EMERGENCY)
Age: 85
Discharge: HOME | End: 2020-05-30
Attending: EMERGENCY MEDICINE
Payer: COMMERCIAL

## 2020-05-30 VITALS
DIASTOLIC BLOOD PRESSURE: 82 MMHG | BODY MASS INDEX: 32.95 KG/M2 | TEMPERATURE: 98.4 F | OXYGEN SATURATION: 96 % | HEIGHT: 66 IN | HEART RATE: 68 BPM | WEIGHT: 205 LBS | RESPIRATION RATE: 20 BRPM | SYSTOLIC BLOOD PRESSURE: 185 MMHG

## 2020-05-30 PROCEDURE — 72125 CT NECK SPINE W/O DYE: CPT

## 2020-05-30 PROCEDURE — 99284 EMERGENCY DEPT VISIT MOD MDM: CPT

## 2020-05-30 PROCEDURE — 90715 TDAP VACCINE 7 YRS/> IM: CPT | Performed by: STUDENT IN AN ORGANIZED HEALTH CARE EDUCATION/TRAINING PROGRAM

## 2020-05-30 PROCEDURE — 70450 CT HEAD/BRAIN W/O DYE: CPT

## 2020-05-30 PROCEDURE — 90471 IMMUNIZATION ADMIN: CPT | Performed by: STUDENT IN AN ORGANIZED HEALTH CARE EDUCATION/TRAINING PROGRAM

## 2020-05-30 PROCEDURE — 2500000003 HC RX 250 WO HCPCS: Performed by: STUDENT IN AN ORGANIZED HEALTH CARE EDUCATION/TRAINING PROGRAM

## 2020-05-30 PROCEDURE — 6360000002 HC RX W HCPCS: Performed by: STUDENT IN AN ORGANIZED HEALTH CARE EDUCATION/TRAINING PROGRAM

## 2020-05-30 PROCEDURE — 6370000000 HC RX 637 (ALT 250 FOR IP): Performed by: STUDENT IN AN ORGANIZED HEALTH CARE EDUCATION/TRAINING PROGRAM

## 2020-05-30 PROCEDURE — 12011 RPR F/E/E/N/L/M 2.5 CM/<: CPT

## 2020-05-30 RX ORDER — DIAPER,BRIEF,INFANT-TODD,DISP
EACH MISCELLANEOUS ONCE
Status: COMPLETED | OUTPATIENT
Start: 2020-05-30 | End: 2020-05-30

## 2020-05-30 RX ORDER — LIDOCAINE HYDROCHLORIDE 10 MG/ML
20 INJECTION, SOLUTION EPIDURAL; INFILTRATION; INTRACAUDAL; PERINEURAL ONCE
Status: COMPLETED | OUTPATIENT
Start: 2020-05-30 | End: 2020-05-30

## 2020-05-30 RX ADMIN — LIDOCAINE HYDROCHLORIDE 5 ML: 10 INJECTION, SOLUTION EPIDURAL; INFILTRATION; INTRACAUDAL; PERINEURAL at 07:49

## 2020-05-30 RX ADMIN — BACITRACIN ZINC: 500 OINTMENT TOPICAL at 11:40

## 2020-05-30 RX ADMIN — TETANUS TOXOID, REDUCED DIPHTHERIA TOXOID AND ACELLULAR PERTUSSIS VACCINE, ADSORBED 0.5 ML: 5; 2.5; 8; 8; 2.5 SUSPENSION INTRAMUSCULAR at 07:50

## 2020-05-30 ASSESSMENT — ENCOUNTER SYMPTOMS
BACK PAIN: 0
DIARRHEA: 0
RHINORRHEA: 0
SORE THROAT: 0
ABDOMINAL PAIN: 0
VOMITING: 0
SHORTNESS OF BREATH: 0
NAUSEA: 0
COUGH: 0

## 2020-05-30 NOTE — ED NOTES
Report attempt x2 to [facility].  at facility attempted phone transfer to assisted living x2. Ring for 3 minutes without answer.      Gerber Soni, CHRISTEL  05/30/20 [id number]

## 2020-05-30 NOTE — ED PROVIDER NOTES
heart sounds. No murmur. Pulmonary:      Effort: Pulmonary effort is normal. No respiratory distress. Breath sounds: Normal breath sounds. No stridor. No wheezing. Abdominal:      General: There is no distension. Palpations: Abdomen is soft. There is no mass. Tenderness: There is no abdominal tenderness. There is no rebound. Musculoskeletal: Normal range of motion. General: No deformity. Skin:     General: Skin is warm and dry. Findings: Lesion present. No rash. Comments: 1 cm vertical laceration to the forehead above the nose     Neurological:      General: No focal deficit present. Mental Status: She is alert and oriented to person, place, and time. Cranial Nerves: No cranial nerve deficit. Coordination: Coordination normal.          Procedures     Laceration Repair Procedure Note  Indication: Laceration  Procedure: The patient was placed in the appropriate position and anesthesia around the laceration was obtained by infiltration using 1% Lidocaine without epinephrine. The area was then cleansed with Shur-Clens and draped in a sterile fashion. The laceration was closed with 6-0 Ethilon using interrupted sutures. There were no additional lacerations requiring repair. The wound area was then dressed with bacitracin. Minimal debridement was preformed, flaps were aligned. No foreign body was identified. Total repaired wound length: 2 cm. Other Items: Suture count: 3  The patient tolerated the procedure well. Complications: None        DEBORA Hills presents to the ED for evaluation of fall. Differential diagnoses included but not limited to acute intracranial bleed, C-spine fracture, laceration. Workup in the ED revealed CT head and neck that were negative for acute findings. Laceration was repaired, tetanus was updated. Patient continues to be non-toxic on re-evaluation.  Findings were discussed with the patient and reasons to immediately

## 2020-06-01 ENCOUNTER — CARE COORDINATION (OUTPATIENT)
Dept: CARE COORDINATION | Age: 85
End: 2020-06-01

## 2020-06-02 RX ORDER — OXYBUTYNIN CHLORIDE 5 MG/1
TABLET ORAL
Qty: 90 TABLET | Refills: 1 | Status: SHIPPED
Start: 2020-06-02 | End: 2020-07-10

## 2020-06-02 RX ORDER — PSEUDOEPHED/ACETAMINOPH/DIPHEN 30MG-500MG
TABLET ORAL
Qty: 120 TABLET | Refills: 3 | Status: SHIPPED
Start: 2020-06-02 | End: 2021-01-11 | Stop reason: SDUPTHER

## 2020-06-02 NOTE — CARE COORDINATION
-Pt resides at [address] [place] [address]. Attempted to call the [facility] nurse's station to see if pt had her own phone at the [facility] for an ED f/u, COVID-19 monitoring call, however, no answer.  -Left detailed VM with the nurse at the nurse's station at [address] [place] [address] requesting a call back.
future reference. From CDC: Are you at higher risk for severe illness?  Wash your hands often.  Avoid close contact (6 feet, which is about two arm lengths) with people who are sick.  Put distance between yourself and other people if COVID-19 is spreading in your community.  Clean and disinfect frequently touched surfaces.  Avoid all cruise travel and non-essential air travel.  Call your healthcare professional if you have concerns about COVID-19 and your underlying condition or if you are sick.     For more information on steps you can take to protect yourself, see CDC's How to Protect Yourself    No further f/u indicated, as [facility] staff monitoring pt

## 2020-06-17 ENCOUNTER — TELEPHONE (OUTPATIENT)
Dept: FAMILY MEDICINE CLINIC | Age: 85
End: 2020-06-17

## 2020-06-17 NOTE — PROGRESS NOTES
Please call patient. She was admitted for a fall. See how she is doing. If she woul dlike we can schedule her an appointment. Otherwise she is coming in July .

## 2020-06-17 NOTE — TELEPHONE ENCOUNTER
- I called and spoke with Malka Sharp: she said that she is doing good. Patient is in isolation due to +COVID. Said that she does not have any concerns at this time. - I called [facility]: Nurse will fax over the result which was inconclusive, so they had to retest her which was done on Monday 6/15/20. They have all residents in isolation for their safety, they test all residents. Nurse states patient is doing great, no symptoms of covid, no complaints about her head or anything else. They will call with any concerns. She will fax those results once they are received.  Their facility can do video visits.  - I advised that patient has to have 2 negatives before she can come to the office on July 15 th.

## 2020-06-19 RX ORDER — SIMVASTATIN 10 MG
TABLET ORAL
Qty: 30 TABLET | Refills: 2 | Status: SHIPPED
Start: 2020-06-19 | End: 2020-08-28 | Stop reason: SDUPTHER

## 2020-07-06 NOTE — TELEPHONE ENCOUNTER
Last Appointment   1/17/2020  Next Appointment  7/15/2020    [address] [place] [address] sent refill request for Spironolactone, Levothyroxine, amlodipine.  Meds pending drs approval.

## 2020-07-07 RX ORDER — AMLODIPINE BESYLATE 5 MG/1
TABLET ORAL
Qty: 30 TABLET | Refills: 5 | OUTPATIENT
Start: 2020-07-07

## 2020-07-07 RX ORDER — SPIRONOLACTONE 25 MG/1
TABLET ORAL
Qty: 30 TABLET | Refills: 0 | Status: SHIPPED
Start: 2020-07-07 | End: 2020-08-05 | Stop reason: SDUPTHER

## 2020-07-07 RX ORDER — LEVOTHYROXINE SODIUM 0.1 MG/1
TABLET ORAL
Qty: 30 TABLET | Refills: 0 | Status: SHIPPED
Start: 2020-07-07 | End: 2020-07-10

## 2020-07-07 RX ORDER — AMLODIPINE BESYLATE 5 MG/1
TABLET ORAL
Qty: 30 TABLET | Refills: 0 | Status: SHIPPED
Start: 2020-07-07 | End: 2020-07-28

## 2020-07-10 RX ORDER — LEVOTHYROXINE SODIUM 0.1 MG/1
TABLET ORAL
Qty: 90 TABLET | Refills: 0 | Status: SHIPPED
Start: 2020-07-10 | End: 2020-08-07 | Stop reason: SDUPTHER

## 2020-07-10 RX ORDER — OXYBUTYNIN CHLORIDE 5 MG/1
TABLET ORAL
Qty: 90 TABLET | Refills: 0 | Status: SHIPPED
Start: 2020-07-10 | End: 2020-08-05 | Stop reason: SDUPTHER

## 2020-07-14 VITALS — WEIGHT: 170 LBS | HEIGHT: 64 IN | BODY MASS INDEX: 29.02 KG/M2

## 2020-07-15 ENCOUNTER — OFFICE VISIT (OUTPATIENT)
Dept: FAMILY MEDICINE CLINIC | Age: 85
End: 2020-07-15
Payer: COMMERCIAL

## 2020-07-15 VITALS
RESPIRATION RATE: 18 BRPM | DIASTOLIC BLOOD PRESSURE: 80 MMHG | SYSTOLIC BLOOD PRESSURE: 145 MMHG | HEIGHT: 64 IN | BODY MASS INDEX: 33.12 KG/M2 | TEMPERATURE: 97.7 F | WEIGHT: 194 LBS | HEART RATE: 67 BPM | OXYGEN SATURATION: 98 %

## 2020-07-15 LAB — HBA1C MFR BLD: 6.1 %

## 2020-07-15 PROCEDURE — 1123F ACP DISCUSS/DSCN MKR DOCD: CPT | Performed by: FAMILY MEDICINE

## 2020-07-15 PROCEDURE — G0439 PPPS, SUBSEQ VISIT: HCPCS | Performed by: FAMILY MEDICINE

## 2020-07-15 PROCEDURE — 4040F PNEUMOC VAC/ADMIN/RCVD: CPT | Performed by: FAMILY MEDICINE

## 2020-07-15 PROCEDURE — 83036 HEMOGLOBIN GLYCOSYLATED A1C: CPT | Performed by: FAMILY MEDICINE

## 2020-07-15 RX ORDER — ZOSTER VACCINE RECOMBINANT, ADJUVANTED 50 MCG/0.5
0.5 KIT INTRAMUSCULAR SEE ADMIN INSTRUCTIONS
Qty: 0.5 ML | Refills: 0 | Status: SHIPPED | OUTPATIENT
Start: 2020-07-15 | End: 2021-01-11

## 2020-07-15 ASSESSMENT — LIFESTYLE VARIABLES: HOW OFTEN DO YOU HAVE A DRINK CONTAINING ALCOHOL: 0

## 2020-07-15 ASSESSMENT — PATIENT HEALTH QUESTIONNAIRE - PHQ9
SUM OF ALL RESPONSES TO PHQ QUESTIONS 1-9: 0
SUM OF ALL RESPONSES TO PHQ QUESTIONS 1-9: 0

## 2020-07-15 NOTE — PATIENT INSTRUCTIONS
Personalized Preventive Plan for Salbador Apgar - 7/15/2020  Medicare offers a range of preventive health benefits. Some of the tests and screenings are paid in full while other may be subject to a deductible, co-insurance, and/or copay. Some of these benefits include a comprehensive review of your medical history including lifestyle, illnesses that may run in your family, and various assessments and screenings as appropriate. After reviewing your medical record and screening and assessments performed today your provider may have ordered immunizations, labs, imaging, and/or referrals for you. A list of these orders (if applicable) as well as your Preventive Care list are included within your After Visit Summary for your review. Other Preventive Recommendations:    · A preventive eye exam performed by an eye specialist is recommended every 1-2 years to screen for glaucoma; cataracts, macular degeneration, and other eye disorders. · A preventive dental visit is recommended every 6 months. · Try to get at least 150 minutes of exercise per week or 10,000 steps per day on a pedometer . · Order or download the FREE \"Exercise & Physical Activity: Your Everyday Guide\" from [employer]. Call 7-921.233.3133 or search [employer] online. · You need 9021-1102 mg of calcium and 1691-2473 IU of vitamin D per day. It is possible to meet your calcium requirement with diet alone, but a vitamin D supplement is usually necessary to meet this goal.  · When exposed to the sun, use a sunscreen that protects against both UVA and UVB radiation with an SPF of 30 or greater. Reapply every 2 to 3 hours or after sweating, drying off with a towel, or swimming. · Always wear a seat belt when traveling in a car. Always wear a helmet when riding a bicycle or motorcycle.

## 2020-07-23 RX ORDER — LORATADINE 10 MG/1
TABLET ORAL
Qty: 30 TABLET | Refills: 3 | Status: SHIPPED
Start: 2020-07-23 | End: 2020-10-27 | Stop reason: SDUPTHER

## 2020-07-27 NOTE — PROGRESS NOTES
Medicare Annual Wellness Visit  Name: Iraida Pinzon Date: 2020   MRN: [id number] Sex: Female   Age: 80 y.o. Ethnicity: Non-/Non    : 1933 Race: White      Donnie Peter is here for Medicare AWV; Diabetes; and Hypertension    Screenings for behavioral, psychosocial and functional/safety risks, and cognitive dysfunction are all negative except as indicated below. These results, as well as other patient data from the [address] form, are documented in Flowsheets linked to this Encounter. Allergies   Allergen Reactions    Flomax [Tamsulosin Hcl] Other (See Comments)     Causes chest pain    Ace Inhibitors Swelling     Causes lip swelling    Alendronate Sodium Other (See Comments)     Pt unsure of allergies    Lasix [Furosemide] Other (See Comments)     weakness    Lisinopril Rash    Penicillins Rash     Rash    Propolis Other (See Comments)    Vioxx [Rofecoxib] Other (See Comments)       Prior to Visit Medications    Medication Sig Taking? Authorizing Provider   zoster recombinant adjuvanted vaccine [facility]) 50 MCG/0.5ML SUSR injection Inject 0.5 mLs into the muscle See Admin Instructions 1 dose now and repeat in 2-6 months Yes Jean Claude Rodrigez MD   oxybutynin (DITROPAN) 5 MG tablet TAKE 2 TABLETS BY MOUTH ONCE DAILY TAKE 1 TABLET BY MOUTH AT  BEDTIME. Patient taking differently: Take 5 mg by mouth daily TAKE 2 TABLETS BY MOUTH ONCE DAILY TAKE 1 TABLET BY MOUTH AT  BEDTIME. Yes Jean Claude Rodrigez MD   levothyroxine (SYNTHROID) 100 MCG tablet TAKE 1 TABLET BY MOUTH IN THE MORNING 30 MINUTES BEFORE BREAKFAST OR OTHER MEDICATIONS. Patient taking differently: Take 100 mcg by mouth Daily TAKE 1 TABLET BY MOUTH IN THE MORNING 30 MINUTES BEFORE BREAKFAST OR OTHER MEDICATIONS.  Yes Jean Claude Rodrigez MD   spironolactone (ALDACTONE) 25 MG tablet TAKE 1 TABLET BY MOUTH DAILY  Patient taking differently: Take 25 mg by mouth daily  Yes Jean Claude Rodrigez MD   amLODIPine (NORVASC) 5 MG tablet TAKE 1 TABLET BY MOUTH AT  BEDTIME. Patient taking differently: Take 5 mg by mouth daily TAKE 1 TABLET BY MOUTH AT  BEDTIME. Yes Alexander Grier MD   simvastatin (ZOCOR) 10 MG tablet TAKE 1 TABLET BY MOUTH AT  BEDTIME. Patient taking differently: Take 10 mg by mouth nightly  Yes Alexander Grier MD   ACETAMINOPHEN EXTRA STRENGTH 500 MG tablet TAKE 1 TABLET BY MOUTH FOUR TIMES DAILY AS NEEDED FOR PAIN. Patient taking differently: Take 500 mg by mouth every 4 hours as needed for Pain  Yes Alexander Grier MD   Cholecalciferol (VITAMIN D) 50 MCG (2000 UT) TABS tablet TAKE 1 TABLET BY MOUTH IN  THE MORNING. Yes Alexander Girer MD   folic acid (FOLVITE) 537 MCG tablet TAKE 1 TABLET BY MOUTH IN  THE MORNING. Patient taking differently: Take 400 mcg by mouth daily  Yes Alexander Girer MD   metoprolol succinate (TOPROL XL) 25 MG extended release tablet TAKE 1 TABLET BY MOUTH AT  BEDTIME. Patient taking differently: Take 25 mg by mouth daily Do not crush or chew. Yes Javon Magana MD   metoprolol succinate (TOPROL XL) 25 MG extended release tablet Take 1 tablet by mouth nightly Yes Javon Magana MD   ASPIR-LOW 81 MG EC tablet TAKE 1 TABLET BY MOUTH ONCE DAILY  Patient taking differently: Take 81 mg by mouth daily Do not crush or break. Yes Alexander Grier MD   TOPROL XL 50 MG extended release tablet Take 1 tablet by mouth daily Yes Javon Magana MD   calcium carbonate-vitamin D (CALTRATE) 600-400 MG-UNIT TABS per tab TAKE 1 TABLET BY MOUTH TWICE A DAY. Patient taking differently: Take 1 tablet by mouth daily  Yes Alexander Grier MD   Cholecalciferol (VITAMIN D) 2000 units CAPS capsule Take 2,000 Units by mouth daily Yes Alexander Grier MD   Omega-3 Fatty Acids (FISH OIL) 1200 MG CAPS TAKE 1 CAPSULE BY MOUTH IN THE MORNING.  Yes Alexander Grier MD   fluticasone (FLONASE) 50 MCG/ACT nasal spray 2 sprays by Each Nostril route daily Yes Alexander Grier MD   vitamin B-12 (CYANOCOBALAMIN) 1000 MCG tablet Take 1 tablet by mouth daily Yes Pooja Huang MD   loratadine (CLARITIN) 10 MG tablet TAKE 1 TABLET BY MOUTH DAILY. Maritza Avendaño MD   hydrochlorothiazide (MICROZIDE) 12.5 MG capsule TAKE 1 CAPSULE BY MOUTH ONCE A DAY. Patient not taking: Reported on 7/15/2020  Pooja Huang MD       Past Medical History:   Diagnosis Date    Asthma     Atrioventricular (AV) dissociation     AV block, 1st degree 2/11/2015    Diabetes mellitus ([address]     GERD (gastroesophageal reflux disease)     Herniated disc     Cervical    Hyperlipidemia     Hypertension     Mild aortic regurgitation 11/5/13    Mitral regurgitation 11/5/13    mild-moderate    Nontoxic uninodular goiter     OA (osteoarthritis of spine)     cervical spine    Obesity        Past Surgical History:   Procedure Laterality Date    A-V CARDIAC PACEMAKER INSERTION Left 06/13/2016    Dual chamber pacemaker [address] by Dr Valentina Lopez         No family history on file. CareTeam (Including outside providers/suppliers regularly involved in providing care):   Patient Care Team:  Pooja Huang MD as PCP - [facility]  Traci Castillo DO as Resident (Family Medicine)  [facility] PAEMLA Dumas as  (Care Coordinator)  Scout Church MD as Consulting Physician (Cardiac Electrophysiology)    Wt Readings from Last 3 Encounters:   07/15/20 194 lb (88 kg)   07/14/20 170 lb (77.1 kg)   05/30/20 205 lb (93 kg)     Vitals:    07/15/20 1459   BP: (!) 145/80   Pulse: 67   Resp: 18   Temp: 97.7 °F (36.5 °C)   TempSrc: Temporal   SpO2: 98%   Weight: 194 lb (88 kg)   Height: 5' 4\" (1.626 m)     Body mass index is 33.3 kg/m². Based upon direct observation of the patient, evaluation of cognition reveals remote memory intact, recent memory impaired. Patient's complete Health Risk Assessment and screening values have been reviewed and are found in Flowsheets.  The following problems were reviewed today and where indicated follow up appointments were made and/or referrals ordered. Positive Risk Factor Screenings with Interventions:     Fall Risk:  Timed Up and Go Test > 12 seconds? (Complete if either Fall Risk answers are Yes): no  2 or more falls in past year?: no  Fall with injury in past year?: (!) yes(1 fall 5/2020, got stitches)  Fall Risk Interventions:    · Home safety tips provided  · Patient declines any further evaluation/treatment for this issue  · previously referred for pt/ot evaluation. reviewed home strengthening exercises. she has rollator walker. Cognitive:  Clock Drawing Test (CDT) Score: (!) Abnormal  Total Score Interpretation: Positive Mini-Cog  Did the patient refuse to take the cognition test?: No  Cognitive Impairment Interventions:  · Patient declines any further evaluation/treatment for cognitive impairment    Health Habits/Nutrition:  Health Habits/Nutrition  Do you exercise for at least 20 minutes 2-3 times per week?: (!) No  Have you lost any weight without trying in the past 3 months?: No  Do you eat fewer than 2 meals per day?: No  Have you seen a dentist within the past year?: (!) No  Body mass index is 33.3 kg/m². Health Habits/Nutrition Interventions:  · Inadequate physical activity:  educational materials provided to promote increased physical activity    Hearing/Vision:  No exam data present  Hearing/Vision  Do you or your family notice any trouble with your hearing?: No  Do you have difficulty driving, watching TV, or doing any of your daily activities because of your eyesight?: No  Have you had an eye exam within the past year?: (!) No  Hearing/Vision Interventions:  · Vision concerns:  patient encouraged to make appointment with his/her eye specialist    ADL:  ADLs  In the past 7 days, did you need help from others to perform any of the following everyday activities?  Eating, dressing, grooming, bathing, toileting, or walking/balance?: None  In the past 7 days, did you need help from others to take care of any of the following? Laundry, housekeeping, banking/finances, shopping, telephone use, food preparation, transportation, or taking medications?: [employer], Housekeeping, Banking/Finances, Shopping, Transportation, Taking Medications  ADL Interventions:  · patient currently in assisted living facility and has the help she requires    Personalized Preventive Plan   Current Health Maintenance Status  Immunization History   Administered Date(s) Administered    Influenza Vaccine, unspecified formulation 10/10/2013, 10/15/2014, 10/31/2014, 10/14/2015, 11/11/2016    Influenza, High Dose (Fluzone 65 yrs and older) 11/11/2016, 09/08/2017, 10/24/2019    Pneumococcal Conjugate 13-valent ([id number]) 12/29/2015    Pneumococcal Polysaccharide ([id number]) 01/12/2017    Tdap (Boostrix, Adacel) 11/11/2013, 05/20/2016, 05/30/2020    Zoster Live (Zostavax) 12/31/2012        Health Maintenance   Topic Date Due    Shingles Vaccine (2 of 3) 02/25/2013    Annual Wellness Visit (AWV)  05/29/2019    Flu vaccine (1) 09/01/2020    Lipid screen  04/07/2021    TSH testing  04/07/2021    Potassium monitoring  04/07/2021    Creatinine monitoring  04/07/2021    DTaP/Tdap/Td vaccine (4 - Td) 05/30/2030    Pneumococcal 65+ years Vaccine  Completed    Hepatitis A vaccine  Aged Out    Hib vaccine  Aged Out    Meningococcal (ACWY) vaccine  Aged Out     Recommendations for [employer] Due: see orders and patient instructions/AVS.  . Recommended screening schedule for the next 5-10 years is provided to the patient in written form: see Patient Instructions/AVS.    Donnie was seen today for medicare awv, diabetes and hypertension.     Diagnoses and all orders for this visit:    Routine general medical examination at a health care facility    Diabetes mellitus type 2, diet-controlled ([address]  -     POCT glycosylated hemoglobin (Hb A1C)    Medication refill    Need for shingles vaccine  - zoster recombinant adjuvanted vaccine [facility]) 50 MCG/0.5ML SUSR injection;  Inject 0.5 mLs into the muscle See Admin Instructions 1 dose now and repeat in 2-6 months

## 2020-07-28 ENCOUNTER — OFFICE VISIT (OUTPATIENT)
Dept: PRIMARY CARE CLINIC | Age: 85
End: 2020-07-28
Payer: COMMERCIAL

## 2020-07-28 VITALS
DIASTOLIC BLOOD PRESSURE: 78 MMHG | HEIGHT: 64 IN | TEMPERATURE: 98.2 F | RESPIRATION RATE: 16 BRPM | WEIGHT: 194 LBS | OXYGEN SATURATION: 97 % | HEART RATE: 80 BPM | SYSTOLIC BLOOD PRESSURE: 130 MMHG | BODY MASS INDEX: 33.12 KG/M2

## 2020-07-28 PROCEDURE — 1090F PRES/ABSN URINE INCON ASSESS: CPT | Performed by: FAMILY MEDICINE

## 2020-07-28 PROCEDURE — 1123F ACP DISCUSS/DSCN MKR DOCD: CPT | Performed by: FAMILY MEDICINE

## 2020-07-28 PROCEDURE — G8417 CALC BMI ABV UP PARAM F/U: HCPCS | Performed by: FAMILY MEDICINE

## 2020-07-28 RX ORDER — AMLODIPINE BESYLATE 5 MG/1
TABLET ORAL
Qty: 30 TABLET | Refills: 11 | Status: SHIPPED
Start: 2020-07-28 | End: 2021-11-08 | Stop reason: SDUPTHER

## 2020-07-28 RX ORDER — B-COMPLEX WITH VITAMIN C
1 TABLET ORAL 2 TIMES DAILY
COMMUNITY
End: 2020-08-14 | Stop reason: SDUPTHER

## 2020-07-28 RX ORDER — MULTIVIT-MIN/IRON/FOLIC ACID/K 18-600-40
1 CAPSULE ORAL DAILY
COMMUNITY
End: 2020-08-28 | Stop reason: SDUPTHER

## 2020-07-28 NOTE — PROGRESS NOTES
2020    Home visit medically necessary in lieu of an office visit due to: [facility] resident, uses walker, difficult to get out. Spoke with niece Darelll Heap on phone. HPI:  Julius Haas (:  1933) is a 80 y.o. female, who is seen today for home medical care evaluation and has HTN (hypertension), benign; Diabetes mellitus type 2, diet-controlled ([address] DDD (degenerative disc disease), cervical; Non-rheumatic mitral regurgitation; AI (aortic insufficiency); Pacemaker; Hyperlipidemia LDL goal <100; Acquired hypothyroidism; Syncope and collapse; Hyponatremia; Primary osteoarthritis involving multiple joints; Mild intermittent asthma without complication; History of complete heart block; Depression; Ambulatory dysfunction; and Chronic fatigue on their problem list. Patient has been a resident at [address] [place] [address] since 2018. She says she has swelling of her legs. She is trying to put her stockings on every day. She fell on 20 and was seen in ED where she needs sutures in face. She says she has had no falls since then. Her niece reports she has been gaining a lot of weight since she cannot be as active and she has to stay in her room with the pandemic. REVIEW OF SYSTEMS:    GENERAL: Appetite good. No weight change, generally healthy, no change in strength or exercise tolerance. SKIN:  No rash, itching, lesions, ecchymosis, erythema or ulcers. HEAD: No headaches, no lightheadedness, no injury. EYES: Normal vision, no diplopia, no tearing, no blind spots, no pain. EARS: No change in hearing, no tinnitus, no bleeding, no vertigo. NOSE: No epistaxis, no coryza, no obstruction, no discharge. MOUTH: No dental difficulties, no gingival bleeding, no use of dentures. POOR DENTITION. NECK: No stiffness, no pain, no tenderness, no noted masses. CARDIOVASCULAR: No chest pains, no murmurs, no palpitations, no syncope, no orthopnea. LEGS HAVE BEEN SWELLING.   RESPIRATORY: No shortness of breath, no pain with breathing, no wheezing, no hemoptysis, no cough, no respiratory infections, no TB, no fevers or night sweats. BREASTS:  No lumps, discharge or pain. GASTROINTESTINAL: No change in appetite, no dysphagia, no heartburn, no abdominal pains, no diarrhea, no bowel habit changes, no emesis, no melena, no hemorrhoids. CONSTIPATION SOMETIMES. GENITOURINARY: No incontinence or retention, no urinary urgency, no frequent UTIs, no dysuria, no change in nature of urine. (Female) No post-menopausal bleeding, no discharge, no itching. STRESS INCONTINENCE AT TIMES. MUSCULOSKELETAL: No swelling or redness of joints, no limitation of range of motion, no weakness or numbness. HAS HAD JT PAINS BUT NOT NOW. NEURO/PSYCH: No weakness, no tremor, no seizures, no changes in mentation, no ataxia. No depressive symptoms, no changes in sleep habits, no changes in thought content. MEMORY PROBLEMS. ALLERGIC/IMMUNOLOGIC/ENDOCRINE:  No reactions to foods or  insects. No anemia, no bleeding tendency, no transfusions.     Allergies   Allergen Reactions    Flomax [Tamsulosin Hcl] Other (See Comments)     Causes chest pain    Ace Inhibitors Swelling     Causes lip swelling    Alendronate Sodium Other (See Comments)     Pt unsure of allergies    Lasix [Furosemide] Other (See Comments)     weakness    Lisinopril Rash    Penicillins Rash     Rash    Propolis Other (See Comments)    Vioxx [Rofecoxib] Other (See Comments)     Past Medical History:   Diagnosis Date    Asthma     Atrioventricular (AV) dissociation     AV block, 1st degree 2/11/2015    [facility] [address] [address]     GERD (gastroesophageal reflux disease)     Herniated disc     Cervical    Hyperlipidemia     Hypertension     Mild aortic regurgitation 11/5/13    Mitral regurgitation 11/5/13    mild-moderate    Nontoxic uninodular goiter     OA (osteoarthritis of spine)     cervical spine    Obesity      Past Surgical History: Procedure Laterality Date    A-V CARDIAC PACEMAKER INSERTION Left 06/13/2016    Dual chamber pacemaker [address] by Dr Humera Mcguire Marital status:      Spouse name: Not on file    Number of children: Not on file    Years of education: Not on file    Highest education level: Not on file   Occupational History    Not on file   Tobacco Use    Smoking status: Never Smoker    Smokeless tobacco: Never Used   Substance and Sexual Activity    Alcohol use: No     Frequency: Never     Binge frequency: Never     Comment:      Drug use: No    Sexual activity: Not Currently   Social History Narrative    1 cup tea daily; occ pop; no coffee      No family history on file. PHYSICAL EXAM:   Vitals:    07/28/20 0942   BP: 130/78   Pulse: 80   Resp: 16   Temp: 98.2 °F (36.8 °C)   SpO2: 97%   Weight: 194 lb (88 kg)   Height: 5' 4\" (1.626 m)     Estimated body mass index is 33.3 kg/m² as calculated from the following:    Height as of this encounter: 5' 4\" (1.626 m). Weight as of this encounter: 194 lb (88 kg). GEN:  elderly WDWN female patient seated in recliner in NAD. HEAD:  atraumatic, normocephalic. EYES:  EOMI, PERRL, no cataracts, conjunctivae appear normal.  ENT:   Good hearing, EACs without wax, TM's normal, nasal septum midline, no significant congestion, oral cavity without lesions, poor-fair dentition, no dentures. NECK:  fair-good ROM, no palpable masses, no carotid bruits, no JVD. LUNGS:  clear to ausc, no rales, rhonchi or wheezes. HEART:  RRR, no murmurs or gallops. ABD:  soft, non-tender to palp, no palp masses or HSM, normal BS. BACK:  no scoliosis or kyphosis, non-tender to palp. EXTREMITIES: Trace bilateral LE edema, no ulcerations, varicosities or erythema. No gross deformities. MUSCULOSKELETAL: Good ROM of all joints, non tender to palp and or with movement.   SKIN:  No ulcerations or breakdown, rash, ecchymosis, or other lesions. NEURO:  No tremor, motor UEs 5/5 LEs  5/5, sensory normal, able to stand and walk slowly using rollator with mild ataxia. PSYCH:  Pleasant and cooperative. Fluid speech, oriented to person, place and time. No delusional statements. Medications reviewed. Labs 7/15/20 A1c 6.1 4/15/20 HH 14/43, Na 126, GFR 64, , HDL 58    ASSESSMENT:  Elderly female has HTN (hypertension), benign; Diabetes mellitus type 2, diet-controlled ([address] DDD (degenerative disc disease), cervical; Non-rheumatic mitral regurgitation; AI (aortic insufficiency); Pacemaker; Hyperlipidemia LDL goal <100; Acquired hypothyroidism; Syncope and collapse; Hyponatremia; Primary osteoarthritis involving multiple joints; Mild intermittent asthma without complication; History of complete heart block; Depression; Ambulatory dysfunction; and Chronic fatigue on their problem list.     Diagnoses attached to this encounter:  Donnie was seen today for hypertension. Diagnoses and all orders for this visit:    HTN (hypertension), benign  -     Comprehensive Metabolic Panel; Future    Diabetes mellitus type 2, diet-controlled ([address]    Primary osteoarthritis involving multiple joints    Chronic fatigue    Hyperlipidemia LDL goal <100    Ambulatory dysfunction    Acquired hypothyroidism    Mild intermittent asthma without complication    History of complete heart block    Pacemaker       PLAN:  Check labs. Discussed diet and activity level. Continue other meds as per Rx List. Recheck 1 month. 60 minutes spent on visit, 35 minutes involved education/counseling regarding disease processes, treatment options, meds and coordination of care.    Current Outpatient Medications   Medication Sig Dispense Refill    calcium carbonate-vitamin D 600-200 MG-UNIT TABS Take 1 tablet by mouth 2 times daily      Vitamin D, Cholecalciferol, 50 MCG (2000 UT) CAPS Take 1 capsule by mouth daily      loratadine (CLARITIN) 10 MG tablet TAKE 1 TABLET BY MOUTH DAILY. 30 tablet 3    oxybutynin (DITROPAN) 5 MG tablet TAKE 2 TABLETS BY MOUTH ONCE DAILY TAKE 1 TABLET BY MOUTH AT  BEDTIME. (Patient taking differently: Take 5 mg by mouth daily TAKE 2 TABLETS BY MOUTH ONCE DAILY TAKE 1 TABLET BY MOUTH AT  BEDTIME.) 90 tablet 0    levothyroxine (SYNTHROID) 100 MCG tablet TAKE 1 TABLET BY MOUTH IN THE MORNING 30 MINUTES BEFORE BREAKFAST OR OTHER MEDICATIONS. (Patient taking differently: Take 100 mcg by mouth Daily TAKE 1 TABLET BY MOUTH IN THE MORNING 30 MINUTES BEFORE BREAKFAST OR OTHER MEDICATIONS.) 90 tablet 0    spironolactone (ALDACTONE) 25 MG tablet TAKE 1 TABLET BY MOUTH DAILY (Patient taking differently: Take 25 mg by mouth daily ) 30 tablet 0    amLODIPine (NORVASC) 5 MG tablet TAKE 1 TABLET BY MOUTH AT  BEDTIME. (Patient taking differently: Take 5 mg by mouth daily TAKE 1 TABLET BY MOUTH AT  BEDTIME.) 30 tablet 0    simvastatin (ZOCOR) 10 MG tablet TAKE 1 TABLET BY MOUTH AT  BEDTIME. (Patient taking differently: Take 10 mg by mouth nightly ) 30 tablet 2    ACETAMINOPHEN EXTRA STRENGTH 500 MG tablet TAKE 1 TABLET BY MOUTH FOUR TIMES DAILY AS NEEDED FOR PAIN. (Patient taking differently: Take 500 mg by mouth every 4 hours as needed for Pain ) 613 tablet 3    folic acid (FOLVITE) 002 MCG tablet TAKE 1 TABLET BY MOUTH IN  THE MORNING. (Patient taking differently: Take 400 mcg by mouth daily ) 30 tablet 11    metoprolol succinate (TOPROL XL) 25 MG extended release tablet Take 1 tablet by mouth nightly 30 tablet 6    ASPIR-LOW 81 MG EC tablet TAKE 1 TABLET BY MOUTH ONCE DAILY (Patient taking differently: Take 81 mg by mouth daily Do not crush or break.) 30 tablet 11    TOPROL XL 50 MG extended release tablet Take 1 tablet by mouth daily 30 tablet 11    Omega-3 Fatty Acids (FISH OIL) 1200 MG CAPS TAKE 1 CAPSULE BY MOUTH IN THE MORNING.  30 capsule 11    zoster recombinant adjuvanted vaccine [facility]) 50 MCG/0.5ML SUSR injection Inject 0.5 mLs into the muscle See Admin Instructions 1 dose now and repeat in 2-6 months 0.5 mL 0     No current facility-administered medications for this visit. Return in about 1 month (around 8/28/2020). An  electronic signature was used to authenticate this note.     --Jerardo Jaeger MD on 7/28/2020 at 11:42 AM

## 2020-08-05 RX ORDER — SPIRONOLACTONE 25 MG/1
TABLET ORAL
Qty: 30 TABLET | Refills: 11 | Status: SHIPPED
Start: 2020-08-05 | End: 2021-10-14 | Stop reason: SDUPTHER

## 2020-08-05 RX ORDER — AMOXICILLIN 500 MG
CAPSULE ORAL
Qty: 30 CAPSULE | Refills: 11 | Status: SHIPPED | OUTPATIENT
Start: 2020-08-05

## 2020-08-05 RX ORDER — OXYBUTYNIN CHLORIDE 5 MG/1
TABLET ORAL
Qty: 90 TABLET | Refills: 11 | Status: SHIPPED
Start: 2020-08-05 | End: 2021-01-15

## 2020-08-07 RX ORDER — LEVOTHYROXINE SODIUM 0.1 MG/1
100 TABLET ORAL DAILY
Qty: 90 TABLET | Refills: 3 | Status: SHIPPED
Start: 2020-08-07 | End: 2022-11-02 | Stop reason: SDUPTHER

## 2020-08-21 LAB
ALBUMIN SERPL-MCNC: 3.4 G/DL
ALP BLD-CCNC: 85 U/L
ALT SERPL-CCNC: 13 U/L
ANION GAP SERPL CALCULATED.3IONS-SCNC: ABNORMAL MMOL/L
AST SERPL-CCNC: 17 U/L
BILIRUB SERPL-MCNC: 0.6 MG/DL (ref 0.1–1.4)
BUN BLDV-MCNC: 23 MG/DL
CALCIUM SERPL-MCNC: 9.2 MG/DL
CHLORIDE BLD-SCNC: 4.7 MMOL/L
CO2: 21 MMOL/L
CREAT SERPL-MCNC: 1 MG/DL
GFR CALCULATED: 52
GLUCOSE BLD-MCNC: 81 MG/DL
POTASSIUM SERPL-SCNC: 96 MMOL/L
SODIUM BLD-SCNC: 127 MMOL/L
TOTAL PROTEIN: 6.9

## 2020-08-27 ENCOUNTER — OFFICE VISIT (OUTPATIENT)
Dept: PRIMARY CARE CLINIC | Age: 85
End: 2020-08-27
Payer: COMMERCIAL

## 2020-08-27 VITALS
RESPIRATION RATE: 18 BRPM | HEART RATE: 69 BPM | BODY MASS INDEX: 32.95 KG/M2 | WEIGHT: 193 LBS | OXYGEN SATURATION: 98 % | SYSTOLIC BLOOD PRESSURE: 137 MMHG | DIASTOLIC BLOOD PRESSURE: 78 MMHG | HEIGHT: 64 IN | TEMPERATURE: 96.9 F

## 2020-08-27 PROBLEM — N32.81 OVERACTIVE BLADDER: Status: ACTIVE | Noted: 2020-08-27

## 2020-08-27 PROCEDURE — G8417 CALC BMI ABV UP PARAM F/U: HCPCS | Performed by: PHYSICIAN ASSISTANT

## 2020-08-27 PROCEDURE — 1123F ACP DISCUSS/DSCN MKR DOCD: CPT | Performed by: PHYSICIAN ASSISTANT

## 2020-08-27 PROCEDURE — 1090F PRES/ABSN URINE INCON ASSESS: CPT | Performed by: PHYSICIAN ASSISTANT

## 2020-08-27 NOTE — PROGRESS NOTES
2020    Home visit medically necessary in lieu of an office visit due to: [facility] resident, uses walker, difficult to get out. Updated niece Zeny Schwartz by phone. HPI:  No Boyd (:  1933) is a 80 y.o. female, who is seen today for home medical care evaluation and has HTN (hypertension), benign; Diabetes mellitus type 2, diet-controlled ([facility] [address] DDD (degenerative disc disease), cervical; Non-rheumatic mitral regurgitation; AI (aortic insufficiency); Pacemaker; Hyperlipidemia LDL goal <100; Acquired hypothyroidism; Syncope and collapse; Hyponatremia; Primary osteoarthritis involving multiple joints; Mild intermittent asthma without complication; History of complete heart block; Depression; Ambulatory dysfunction; Chronic fatigue; and Overactive bladder on their problem list.     Hettie states other that having urinary frequency (has OAB) she is doing ok. She makes sure she uses her rollator when ambulation. No falls this past month. Happy to see she can go to  to each lunch. Looking forward to [facility] activities once they resume. Denies fever/chills, SOB/CP. No cough/wheezing today. No abdominal pain, NVD or dysuria. Moving bowels regularly. Using compression hose and elevating legs to prevent leg swelling. No increased joint pain lately. REVIEW OF SYSTEMS:    GENERAL: Appetite good. No weight change, generally healthy, no change in strength or exercise tolerance. SKIN:  No rash, itching, lesions, ecchymosis, erythema or ulcers. HEAD: No headaches, no lightheadedness, no injury. EYES: Normal vision, no diplopia, no tearing, no blind spots, no pain. EARS: No change in hearing, no tinnitus, no bleeding, no vertigo. NOSE: No epistaxis, no coryza, no obstruction, no discharge. MOUTH: No dental difficulties, no gingival bleeding, no use of dentures. POOR DENTITION. NECK: No stiffness, no pain, no tenderness, no noted masses.        CARDIOVASCULAR: No chest pains, no murmurs, no palpitations, no syncope, no orthopnea. LEGS HAVE BEEN SWELLING. RESPIRATORY: No shortness of breath, no pain with breathing, no wheezing, no hemoptysis, no cough, no respiratory infections, no TB, no fevers or night sweats. BREASTS:  No lumps, discharge or pain. GASTROINTESTINAL: No change in appetite, no dysphagia, no heartburn, no abdominal pains, no diarrhea, no bowel habit changes, no emesis, no melena, no hemorrhoids. CONSTIPATION SOMETIMES. GENITOURINARY: No incontinence or retention, no urinary urgency, no frequent UTIs, no dysuria, no change in nature of urine. (Female) No post-menopausal bleeding, no discharge, no itching. STRESS INCONTINENCE AT TIMES. MUSCULOSKELETAL: No swelling or redness of joints, no limitation of range of motion, no weakness or numbness. HAS HAD JT PAINS BUT NOT NOW. NEURO/PSYCH: No weakness, no tremor, no seizures, no changes in mentation, no ataxia. No depressive symptoms, no changes in sleep habits, no changes in thought content. MEMORY PROBLEMS. ALLERGIC/IMMUNOLOGIC/ENDOCRINE:  No reactions to foods or  insects. No anemia, no bleeding tendency, no transfusions.     Allergies   Allergen Reactions    Flomax [Tamsulosin Hcl] Other (See Comments)     Causes chest pain    Ace Inhibitors Swelling     Causes lip swelling    Alendronate Sodium Other (See Comments)     Pt unsure of allergies    Lasix [Furosemide] Other (See Comments)     weakness    Lisinopril Rash    Penicillins Rash     Rash    Propolis Other (See Comments)    Vioxx [Rofecoxib] Other (See Comments)     Past Medical History:   Diagnosis Date    Asthma     Atrioventricular (AV) dissociation     AV block, 1st degree 2/11/2015    Diabetes mellitus ([facility] [address]     GERD (gastroesophageal reflux disease)     Herniated disc     Cervical    Hyperlipidemia     Hypertension     Mild aortic regurgitation 11/5/13    Mitral regurgitation 11/5/13    mild-moderate    Nontoxic uninodular goiter  OA (osteoarthritis of spine)     cervical spine    Obesity      Past Surgical History:   Procedure Laterality Date    A-V CARDIAC PACEMAKER INSERTION Left 06/13/2016    Dual chamber pacemaker [address] by  [facility] Marital status:      Spouse name: Not on file    Number of children: Not on file    Years of education: Not on file    Highest education level: Not on file   Occupational History    Not on file   Tobacco Use    Smoking status: Never Smoker    Smokeless tobacco: Never Used   Substance and Sexual Activity    Alcohol use: No     Frequency: Never     Binge frequency: Never     Comment:      Drug use: No    Sexual activity: Not Currently   Social History Narrative    1 cup tea daily; occ pop; no coffee      No family history on file. PHYSICAL EXAM:   Vitals:    08/27/20 1041   BP: 137/78   Pulse: 69   Resp: 18   Temp: 96.9 °F (36.1 °C)   TempSrc: Tympanic   SpO2: 98%   Weight: 193 lb (87.5 kg)   Height: 5' 4\" (1.626 m)     Estimated body mass index is 33.13 kg/m² as calculated from the following:    Height as of this encounter: 5' 4\" (1.626 m). Weight as of this encounter: 193 lb (87.5 kg). GEN:  elderly WDWN female patient seated in recliner in NAD. HEAD:  atraumatic, normocephalic. EYES:  EOMI, PERRL, no cataracts, conjunctivae appear normal.  ENT:   Good hearing, EACs without wax, TM's normal, nasal septum midline, no significant congestion, oral cavity without lesions, poor-fair dentition, no dentures. NECK:  fair-good ROM, no palpable masses, no carotid bruits, no JVD. LUNGS:  clear to ausc, no rales, rhonchi or wheezes. HEART:  RRR, no murmurs or gallops. ABD:  soft, non-tender to palp, no palp masses or HSM, normal BS. BACK:  no scoliosis or kyphosis, non-tender to palp. EXTREMITIES: Trace bilateral LE edema, no ulcerations, varicosities or erythema. No gross deformities. MUSCULOSKELETAL: Good ROM of all joints, non tender to palp and or with movement. SKIN:  No ulcerations or breakdown, rash, ecchymosis, or other lesions. NEURO:  No tremor, motor UEs 5/5 LEs  5/5, sensory normal, able to stand and walk slowly using rollator with mild ataxia. PSYCH:  Pleasant and cooperative. Fluid speech, oriented to person, place and time. No delusional statements. Medications reviewed. Labs 8/21/20 GFR 52, Na 127, Glu 81, Alb 3;4   7/15/20 A1c 6.1 4/15/20 HH 14/43, Na 126, GFR 64, , HDL 58  6/11/20 COVID neg. ASSESSMENT:  Elderly female has HTN (hypertension), benign; Diabetes mellitus type 2, diet-controlled ([address] DDD (degenerative disc disease), cervical; Non-rheumatic mitral regurgitation; AI (aortic insufficiency); Pacemaker; Hyperlipidemia LDL goal <100; Acquired hypothyroidism; Syncope and collapse; Hyponatremia; Primary osteoarthritis involving multiple joints; Mild intermittent asthma without complication; History of complete heart block; Depression; Ambulatory dysfunction; Chronic fatigue; and Overactive bladder on their problem list.     Donnie was seen today for hypertension. Diagnoses and all orders for this visit:    HTN (hypertension), benign    Diabetes mellitus type 2, diet-controlled ([address]    Overactive bladder    Primary osteoarthritis involving multiple joints    Ambulatory dysfunction         PLAN:  Discussed diet and activity level. Continue other meds as per Rx List. Recheck 1 month. 40 minutes spent on visit, 25 minutes involved education/counseling regarding disease processes, treatment options, meds and coordination of care.    Current Outpatient Medications   Medication Sig Dispense Refill    calcium carbonate-vitamin D 600-200 MG-UNIT TABS Take 1 tablet by mouth 2 times daily 60 tablet 11    levothyroxine (SYNTHROID) 100 MCG tablet Take 1 tablet by mouth Daily TAKE 1 TABLET BY MOUTH IN THE MORNING 30 MINUTES BEFORE BREAKFAST OR OTHER MEDICATIONS. 90 tablet 3    spironolactone (ALDACTONE) 25 MG tablet TAKE 1 TABLET BY MOUTH DAILY 30 tablet 11    Omega-3 Fatty Acids (FISH OIL) 1200 MG CAPS TAKE 1 CAPSULE BY MOUTH IN THE MORNING. 30 capsule 11    oxybutynin (DITROPAN) 5 MG tablet TAKE 2 TABLETS BY MOUTH ONCE DAILY TAKE 1 TABLET BY MOUTH AT  BEDTIME. 90 tablet 11    Vitamin D, Cholecalciferol, 50 MCG (2000 UT) CAPS Take 1 capsule by mouth daily      amLODIPine (NORVASC) 5 MG tablet TAKE 1 TABLET BY MOUTH AT  BEDTIME. 30 tablet 11    loratadine (CLARITIN) 10 MG tablet TAKE 1 TABLET BY MOUTH DAILY. 30 tablet 3    zoster recombinant adjuvanted vaccine (SHINGRIX) 50 MCG/0.5ML SUSR injection Inject 0.5 mLs into the muscle See Admin Instructions 1 dose now and repeat in 2-6 months 0.5 mL 0    simvastatin (ZOCOR) 10 MG tablet TAKE 1 TABLET BY MOUTH AT  BEDTIME. (Patient taking differently: Take 10 mg by mouth nightly ) 30 tablet 2    ACETAMINOPHEN EXTRA STRENGTH 500 MG tablet TAKE 1 TABLET BY MOUTH FOUR TIMES DAILY AS NEEDED FOR PAIN. (Patient taking differently: Take 500 mg by mouth every 4 hours as needed for Pain ) 047 tablet 3    folic acid (FOLVITE) 748 MCG tablet TAKE 1 TABLET BY MOUTH IN  THE MORNING. (Patient taking differently: Take 400 mcg by mouth daily ) 30 tablet 11    metoprolol succinate (TOPROL XL) 25 MG extended release tablet Take 1 tablet by mouth nightly 30 tablet 6    ASPIR-LOW 81 MG EC tablet TAKE 1 TABLET BY MOUTH ONCE DAILY (Patient taking differently: Take 81 mg by mouth daily Do not crush or break.) 30 tablet 11    TOPROL XL 50 MG extended release tablet Take 1 tablet by mouth daily 30 tablet 11     No current facility-administered medications for this visit. Return in about 1 month (around 9/27/2020). An electronic signature was used to authenticate this note.     --Alfred Florez PA-C on 8/28/2020 at 7:48 AM

## 2020-08-28 RX ORDER — MULTIVIT-MIN/IRON/FOLIC ACID/K 18-600-40
1 CAPSULE ORAL DAILY
Qty: 30 CAPSULE | Refills: 11 | Status: SHIPPED
Start: 2020-08-28 | End: 2020-10-19

## 2020-08-28 RX ORDER — SIMVASTATIN 10 MG
TABLET ORAL
Qty: 30 TABLET | Refills: 11 | Status: SHIPPED | OUTPATIENT
Start: 2020-08-28

## 2020-09-22 ENCOUNTER — OFFICE VISIT (OUTPATIENT)
Dept: PRIMARY CARE CLINIC | Age: 85
End: 2020-09-22
Payer: COMMERCIAL

## 2020-09-22 VITALS
HEIGHT: 64 IN | DIASTOLIC BLOOD PRESSURE: 78 MMHG | OXYGEN SATURATION: 99 % | HEART RATE: 70 BPM | WEIGHT: 192 LBS | SYSTOLIC BLOOD PRESSURE: 118 MMHG | TEMPERATURE: 97.5 F | RESPIRATION RATE: 20 BRPM | BODY MASS INDEX: 32.78 KG/M2

## 2020-09-22 PROCEDURE — G8417 CALC BMI ABV UP PARAM F/U: HCPCS | Performed by: FAMILY MEDICINE

## 2020-09-22 PROCEDURE — 1123F ACP DISCUSS/DSCN MKR DOCD: CPT | Performed by: FAMILY MEDICINE

## 2020-09-22 PROCEDURE — 1090F PRES/ABSN URINE INCON ASSESS: CPT | Performed by: FAMILY MEDICINE

## 2020-09-22 NOTE — PROGRESS NOTES
9/22/2020    Home visit medically necessary in lieu of an office visit due to: [facility] resident, uses walker, difficult to get out. Updated niece Jona Orozco by phone. HPI:   Patient c/o joint pain especially in hands, feet and legs. She is taking Tylenol as needed and using BioFreeze. She always uses her rollator when ambulation. She has had no falls this past month. She has had no cough, fever/chills, SOB/CP. No abdominal pain, NVD or dysuria. Moving bowels regularly. Using compression hose and elevating legs to prevent leg swelling. REVIEW OF SYSTEMS:    GENERAL: Appetite good. No weight change, generally healthy, no change in strength or exercise tolerance. CARDIOVASCULAR: No chest pains, no murmurs, no palpitations, no syncope, no orthopnea. LEGS HAVE BEEN SWELLING. RESPIRATORY: No shortness of breath, no pain with breathing, no wheezing, no hemoptysis, no cough. BREASTS:  No lumps, discharge or pain. GASTROINTESTINAL: No change in appetite, no dysphagia, no heartburn, no abdominal pains, no diarrhea, no bowel habit changes, no emesis, no melena, no hemorrhoids. CONSTIPATION SOMETIMES. GENITOURINARY: No incontinence or retention, no urinary urgency, no frequent UTIs, no dysuria, no change in nature of urine. STRESS INCONTINENCE AT TIMES. MUSCULOSKELETAL: No swelling or redness of joints, no limitation of range of motion, no weakness or numbness. HAS HAD JT PAINS BUT NOT NOW. NEURO/PSYCH: No weakness, no tremor, no seizures, no changes in mentation, no ataxia. No depressive symptoms, no changes in sleep habits, no changes in thought content. MEMORY PROBLEMS. All other systems negative.     Allergies   Allergen Reactions    Flomax [Tamsulosin Hcl] Other (See Comments)     Causes chest pain    Ace Inhibitors Swelling     Causes lip swelling    Alendronate Sodium Other (See Comments)     Pt unsure of allergies    Lasix [Furosemide] Other (See Comments)     weakness    Lisinopril Rash    Penicillins Rash     Rash    Propolis Other (See Comments)    Vioxx [Rofecoxib] Other (See Comments)     Past Medical History:   Diagnosis Date    Asthma     Atrioventricular (AV) dissociation     AV block, 1st degree 2/11/2015    Diabetes mellitus ([facility] [address]     GERD (gastroesophageal reflux disease)     Herniated disc     Cervical    Hyperlipidemia     Hypertension     Mild aortic regurgitation 11/5/13    Mitral regurgitation 11/5/13    mild-moderate    Nontoxic uninodular goiter     OA (osteoarthritis of spine)     cervical spine    Obesity      Past Surgical History:   Procedure Laterality Date    A-V CARDIAC PACEMAKER INSERTION Left 06/13/2016    Dual chamber pacemaker [address] by Dr Mattie Morales History     Socioeconomic History    Marital status:      Spouse name: Not on file    Number of children: None    Years of education: Not on file    Highest education level: Not on file   Occupational History    Not on file   Tobacco Use    Smoking status: Never Smoker    Smokeless tobacco: Never Used   Substance and Sexual Activity    Alcohol use: No     Frequency: Never     Binge frequency: Never     Comment:      Drug use: No    Sexual activity: Not Currently   Social History Narrative    1 cup tea daily; occ pop; no coffee      No family history on file. PHYSICAL EXAM:   Vitals:    09/22/20 1132   BP: 118/78   Site: Left Lower Arm   Position: Sitting   Pulse: 70   Resp: 20   Temp: 97.5 °F (36.4 °C)   TempSrc: Infrared   SpO2: 99%   Weight: 192 lb (87.1 kg)   Height: 5' 4\" (1.626 m)     Estimated body mass index is 32.96 kg/m² as calculated from the following:    Height as of this encounter: 5' 4\" (1.626 m). Weight as of this encounter: 192 lb (87.1 kg). GEN:  elderly WDWN female patient seated in recliner in NAD. HEAD:  atraumatic, normocephalic.    EYES:  EOMI, PERRL, no cataracts, conjunctivae appear normal.  ENT:   Good hearing, EACs without wax, TM's normal, nasal septum midline, no significant congestion, oral cavity without lesions, poor-fair dentition, no dentures. NECK:  fair-good ROM, no palpable masses, no carotid bruits, no JVD. LUNGS:  clear to ausc, no rales, rhonchi or wheezes. HEART:  RRR, no murmurs or gallops. ABD:  soft, non-tender to palp, no palp masses or HSM, normal BS. BACK:  no scoliosis or kyphosis, non-tender to palp. EXTREMITIES: Trace bilateral LE edema with support stockings on, no ulcerations, varicosities or erythema. No gross deformities. MUSCULOSKELETAL: Good ROM of all joints, non tender to palp and or with movement. SKIN:  No ulcerations or breakdown, rash, ecchymosis, or other lesions. NEURO:  No tremor, motor UEs 5/5 LEs  5/5, sensory normal, able to stand and walk slowly using rollator with mild ataxia. PSYCH:  Pleasant and cooperative. Fluid speech, oriented to person, place and time. No delusional statements. Medications reviewed. Labs 8/21/20 GFR 52, Na 127, Glu 81, Alb 3;4   7/15/20 A1c 6.1 4/15/20 HH 14/43, Na 126, GFR 64, , HDL 58  6/11/20 COVID neg. ASSESSMENT:  Elderly female has HTN (hypertension), benign; Diabetes mellitus type 2, diet-controlled ([address] DDD (degenerative disc disease), cervical; Non-rheumatic mitral regurgitation; AI (aortic insufficiency); Pacemaker; Hyperlipidemia LDL goal <100; Acquired hypothyroidism; Syncope and collapse; Hyponatremia; Primary osteoarthritis involving multiple joints; Mild intermittent asthma without complication; History of complete heart block; Depression; Ambulatory dysfunction; Chronic fatigue; and Overactive bladder on their problem list.     Donnie was seen today for joint pain and urinary frequency.     Diagnoses and all orders for this visit:    HTN (hypertension), benign    Primary osteoarthritis involving multiple joints    Diabetes mellitus type 2, diet-controlled ([address]    Overactive bladder       PLAN:  Reminded to use BioFreeze on painful joints. Also discussed Tylenol. Encouraged to drink plenty of fluids. Discussed diet and activity level. Continue other meds as per Rx List. Recheck 1 month. 40 minutes spent on visit, 25 minutes involved education/counseling regarding HTN, urinary, and DJD disease processes, treatment options, meds and coordination of care. Current Outpatient Medications   Medication Sig Dispense Refill    Vitamin D, Cholecalciferol, 50 MCG (2000 UT) CAPS Take 1 capsule by mouth daily 30 capsule 11    simvastatin (ZOCOR) 10 MG tablet TAKE 1 TABLET BY MOUTH AT  BEDTIME. 30 tablet 11    calcium carbonate-vitamin D 600-200 MG-UNIT TABS Take 1 tablet by mouth 2 times daily 60 tablet 11    levothyroxine (SYNTHROID) 100 MCG tablet Take 1 tablet by mouth Daily TAKE 1 TABLET BY MOUTH IN THE MORNING 30 MINUTES BEFORE BREAKFAST OR OTHER MEDICATIONS. 90 tablet 3    spironolactone (ALDACTONE) 25 MG tablet TAKE 1 TABLET BY MOUTH DAILY 30 tablet 11    Omega-3 Fatty Acids (FISH OIL) 1200 MG CAPS TAKE 1 CAPSULE BY MOUTH IN THE MORNING. 30 capsule 11    oxybutynin (DITROPAN) 5 MG tablet TAKE 2 TABLETS BY MOUTH ONCE DAILY TAKE 1 TABLET BY MOUTH AT  BEDTIME. 90 tablet 11    amLODIPine (NORVASC) 5 MG tablet TAKE 1 TABLET BY MOUTH AT  BEDTIME. 30 tablet 11    loratadine (CLARITIN) 10 MG tablet TAKE 1 TABLET BY MOUTH DAILY. 30 tablet 3    zoster recombinant adjuvanted vaccine (SHINGRIX) 50 MCG/0.5ML SUSR injection Inject 0.5 mLs into the muscle See Admin Instructions 1 dose now and repeat in 2-6 months 0.5 mL 0    ACETAMINOPHEN EXTRA STRENGTH 500 MG tablet TAKE 1 TABLET BY MOUTH FOUR TIMES DAILY AS NEEDED FOR PAIN. (Patient taking differently: Take 500 mg by mouth every 4 hours as needed for Pain ) 528 tablet 3    folic acid (FOLVITE) 511 MCG tablet TAKE 1 TABLET BY MOUTH IN  THE MORNING.  (Patient taking differently: Take 400 mcg by mouth daily ) 30 tablet 11    metoprolol succinate (TOPROL XL) 25 MG extended

## 2020-10-12 ENCOUNTER — OFFICE VISIT (OUTPATIENT)
Dept: CARDIOLOGY CLINIC | Age: 85
End: 2020-10-12
Payer: COMMERCIAL

## 2020-10-12 VITALS
BODY MASS INDEX: 32.68 KG/M2 | HEART RATE: 68 BPM | SYSTOLIC BLOOD PRESSURE: 132 MMHG | RESPIRATION RATE: 20 BRPM | HEIGHT: 64 IN | WEIGHT: 191.4 LBS | DIASTOLIC BLOOD PRESSURE: 70 MMHG

## 2020-10-12 PROCEDURE — 1090F PRES/ABSN URINE INCON ASSESS: CPT | Performed by: INTERNAL MEDICINE

## 2020-10-12 PROCEDURE — G8417 CALC BMI ABV UP PARAM F/U: HCPCS | Performed by: INTERNAL MEDICINE

## 2020-10-12 PROCEDURE — G8484 FLU IMMUNIZE NO ADMIN: HCPCS | Performed by: INTERNAL MEDICINE

## 2020-10-12 PROCEDURE — G8427 DOCREV CUR MEDS BY ELIG CLIN: HCPCS | Performed by: INTERNAL MEDICINE

## 2020-10-12 PROCEDURE — 99213 OFFICE O/P EST LOW 20 MIN: CPT | Performed by: INTERNAL MEDICINE

## 2020-10-12 PROCEDURE — 1123F ACP DISCUSS/DSCN MKR DOCD: CPT | Performed by: INTERNAL MEDICINE

## 2020-10-12 PROCEDURE — 93000 ELECTROCARDIOGRAM COMPLETE: CPT | Performed by: INTERNAL MEDICINE

## 2020-10-12 PROCEDURE — 1036F TOBACCO NON-USER: CPT | Performed by: INTERNAL MEDICINE

## 2020-10-12 PROCEDURE — 4040F PNEUMOC VAC/ADMIN/RCVD: CPT | Performed by: INTERNAL MEDICINE

## 2020-10-12 RX ORDER — FLUTICASONE PROPIONATE 50 MCG
2 SPRAY, SUSPENSION (ML) NASAL DAILY
COMMUNITY
End: 2022-03-10 | Stop reason: SDUPTHER

## 2020-10-13 NOTE — PROGRESS NOTES
OFFICE VISIT        PRIMARY CARE PHYSICIAN:    Jennifer Delgado MD       ALLERGIES / SENSITIVITIES:    Allergies   Allergen Reactions    Flomax [Tamsulosin Hcl] Other (See Comments)     Causes chest pain    Ace Inhibitors Swelling     Causes lip swelling    Alendronate Sodium Other (See Comments)     Pt unsure of allergies    Lasix [Furosemide] Other (See Comments)     weakness    Lisinopril Rash    Penicillins Rash     Rash    Propolis Other (See Comments)    Vioxx [Rofecoxib] Other (See Comments)        REVIEWED MEDICATIONS:      Current Outpatient Medications:     fluticasone (FLONASE) 50 MCG/ACT nasal spray, 2 sprays by Each Nostril route daily, Disp: , Rfl:     Lidocaine HCl (L-E) gel, Apply topically once, Disp: , Rfl:     metoprolol tartrate (LOPRESSOR) 25 MG tablet, Take 25 mg by mouth every evening, Disp: , Rfl:     Vitamin D, Cholecalciferol, 50 MCG (2000 UT) CAPS, Take 1 capsule by mouth daily, Disp: 30 capsule, Rfl: 11    simvastatin (ZOCOR) 10 MG tablet, TAKE 1 TABLET BY MOUTH AT  BEDTIME., Disp: 30 tablet, Rfl: 11    calcium carbonate-vitamin D 600-200 MG-UNIT TABS, Take 1 tablet by mouth 2 times daily, Disp: 60 tablet, Rfl: 11    levothyroxine (SYNTHROID) 100 MCG tablet, Take 1 tablet by mouth Daily TAKE 1 TABLET BY MOUTH IN THE MORNING 30 MINUTES BEFORE BREAKFAST OR OTHER MEDICATIONS., Disp: 90 tablet, Rfl: 3    spironolactone (ALDACTONE) 25 MG tablet, TAKE 1 TABLET BY MOUTH DAILY, Disp: 30 tablet, Rfl: 11    Omega-3 Fatty Acids (FISH OIL) 1200 MG CAPS, TAKE 1 CAPSULE BY MOUTH IN THE MORNING., Disp: 30 capsule, Rfl: 11    oxybutynin (DITROPAN) 5 MG tablet, TAKE 2 TABLETS BY MOUTH ONCE DAILY TAKE 1 TABLET BY MOUTH AT  BEDTIME. (Patient taking differently: Take 10 mg by mouth daily w/ 5 mg at night), Disp: 90 tablet, Rfl: 11    amLODIPine (NORVASC) 5 MG tablet, TAKE 1 TABLET BY MOUTH AT  BEDTIME., Disp: 30 tablet, Rfl: 11    loratadine (CLARITIN) 10 MG tablet, TAKE 1 TABLET BY MOUTH fevers, chills, night sweats or fatigue. HEENT:  Denies any unusual headaches. Denies recent changes in hearing or vision. Denies dysphagia, hoarseness, hemoptysis, hematemesis or epistaxis. ENDOCRINE:  Denies polyphagia, polydipsia or polyuria. Denies heat or cold intolerance. MUSCULOSKELETAL:  She complains of bilateral lower extremity weakness but denies any arthralgias or myalgias. SKIN:  Denies rashes, ulcers or itching. HEME/LYMPH:  Denies any palpable lymph nodes, bleeding or easy bruisability. HEART:  As above. LUNGS:  Denies cough or sputum production. GI: Denies abdominal pain, nausea, vomiting, diarrhea, constipation, rectal bleeding or tarry stools. :  Denies hematuria or dysuria. PSYCHIATRIC:  Denies mood changes, anxiety or depression. NEUROLOGIC:  Denies memory loss, motor weakness, numbness, tingling or tremors.                   CARDIOVASCULAR HISTORY:    1. Hypertension. 2.  Diabetes mellitus. 3.  Hyperlipidemia. 4.  Obesity. 5.  Echocardiogram done on 11/5/2013 showed normal left ventricular size with mild concentric left ventricular hypertrophy with proximal septal thickening with normal left ventricular wall motion with hyperdynamic left ventricular systolic function with an ejection fraction of more than 75% with stage I left ventricular diastolic dysfunction.  Normal right ventricular size and function.  Mild to moderate mitral regurgitation.  Mild aortic regurgitation. 6.  Lexiscan nuclear stress test done on 11/5/2013 was probably a normal study showing a small, reversible defect involving the anterolateral wall of the left ventricle near the apex, more consistent with shifting breast attenuation artifact and less likely the result of stress induced ischemia with the gated views showing no regional wall motion abnormality with possible hyperdynamic left ventricular systolic function. 7. Ace inhibitor allergy:  Causes lip swelling.    8. 2-D echocardiogram February 2016 proximal septal thickening last hypertrophy and EF 75% and stage I diastolic dysfunction with mild mitral annular calcification and aortic valve appears mildly sclerotic and is trileaflet and there is mild aortic regurgitation and fat pad versus small pericardial effusion cannot be ruled out  9. Lexiscan stress test 2016 within normal limits showing no evidence of scars or stress-induced ischemia and the gated view showing no regional wall motion abnormality with possible hyperdynamic left ventricular systolic function with an ejection fraction of 87%and unchanged significantly from 2013  10. Complete heart block with junctional escape rhythm with rates in the 30's and 40's and as low as 20 1. Status post dual chamber pacemaker by Dr. Guillermina Mayer 2016 with a [employer] model number 12/3/01 serial #[id number]  11. [employer] nuclear stress test done on 2018 showed no evidence of stress-induced ischemia with an ejection fraction of 71%. 12.  Limited echo done on 2018 was reported as showing an ejection fraction of 50-55% with sigmoid septum with moderate asymmetric septal hypertrophy with normal right ventricular structure and function and no evidence of pericardial effusion. 13.  Syncopal episode in 2018: Thought to be vasovagal.       PAST MEDICAL HISTORY:  1.  As under cardiovascular history. 2.  Nontoxic nodular goiter. 3.  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 4.  S/P complete hysterectomy in . 5.  Asthma. 6.  GERD.     FAMILY HISTORY:  Mother  in her 24'J from complications of pancreatic cancer: Had history of hypertension. Father  at age 67 from \"heart problems\".  Had rheumatoid arthritis and was a smoker.     SOCIAL HISTORY:  Patient denies tobacco, alcohol or illicit drug use.        O:  COMPLETE PHYSICAL EXAM:      /70   Pulse 68   Resp 20   Ht 5' 4\" (1.626 m) Wt 191 lb 6.4 oz (86.8 kg)   LMP  (LMP Unknown)   BMI 32.85 kg/m²      General:          Well-developed, well-nourished, elderly lady in no acute distress. HEENT:           Normocephalic and atraumatic head. No JVD. No carotid bruits. Carotid upstrokes normal bilaterally. No thyromegaly. Sclerae not   icteric. No xanthelasmas. Mucous membranes moist.  Chest:             Symmetrical and nontender. No deformities site of pacemaker upper chest unremarkable. Lungs:             Clear to auscultation bilaterally. Heart:              Normal S1 and S2. No S3 or S4. Grade 1/6 systolic murmur heard at the right upper sternal border. Abdomen:        Soft, nontender without organomegaly or masses. No bruits. Normal bowel sounds. Extremities: Thick shins. Nonpitting edema bilaterally  Pulses palpable. Skin:                Normal turgor. No rashes or ulcers noted. Neurologic:      Oriented x3. No focal deficits detected. REVIEW OF DIAGNOSTIC TESTS:    1. EKG done today showed sinus rhythm with ventricular pacing. 2.  Blood tests from 4/7/2020 reviewed: Total cholesterol 137, triglycerides 90, HDL 58, LDL 61, CBC unremarkable. 3.  Hgb A1C from 7/15/2020:  6.1.    4.  CMP from 8/21/2020 reviewed:  Sodium 127, potassium 4.7, BUN 23, creatinine 1.0, GFR 52, albumin 3.4. Otherwise, LFT's normal.         ASSESSMENT / DIAGNOSIS:   1. Hypertension: Adequately controlled. 2.  Diabetes mellitus: Diet controlled.                3.  Hyperlipidemia: On statin therapy.              4.  Nodular goiter with hypothyroidism: On replacement therapy.              5.  Degenerative disc disease of the cervical spine.              6.  Osteoarthritis.               7.  History of complete heart block, status post dual chamber pacemaker insertion.   With bursts of nonsustained ventricular tachycardia x2     8.  Asthma.              9.  Valvular heart disease (mitral regurgitation and aortic regurgitation).   10. History of syncopal episode:  ? Vasovagal.  11. Bilateral lower extremity weakness with history of fall: Uses a Rolator to ambulate. 12. Gait difficulty. 13. Non morbid obesity. TREATMENT PLAN:  1. Reassure. 2.  Continue current medications. 3.  Will schedule pacemaker follow up. 4. Follow up with Cardiology in 1 year or on a prn basis. Fe Clarke.  [place] [id number] [phone]

## 2020-10-14 RX ORDER — METOPROLOL SUCCINATE 50 MG/1
TABLET, EXTENDED RELEASE ORAL
Qty: 30 TABLET | Refills: 3 | Status: SHIPPED
Start: 2020-10-14 | End: 2021-03-09

## 2020-10-19 ENCOUNTER — NURSE ONLY (OUTPATIENT)
Dept: CARDIOLOGY CLINIC | Age: 85
End: 2020-10-19
Payer: COMMERCIAL

## 2020-10-19 PROCEDURE — 93280 PM DEVICE PROGR EVAL DUAL: CPT | Performed by: INTERNAL MEDICINE

## 2020-10-19 RX ORDER — CHOLECALCIFEROL (VITAMIN D3) 50 MCG
1 TABLET ORAL DAILY
COMMUNITY
Start: 2020-09-22

## 2020-10-20 ENCOUNTER — OFFICE VISIT (OUTPATIENT)
Dept: PRIMARY CARE CLINIC | Age: 85
End: 2020-10-20
Payer: COMMERCIAL

## 2020-10-20 VITALS
RESPIRATION RATE: 20 BRPM | BODY MASS INDEX: 33.12 KG/M2 | TEMPERATURE: 97.6 F | SYSTOLIC BLOOD PRESSURE: 139 MMHG | HEART RATE: 64 BPM | WEIGHT: 194 LBS | HEIGHT: 64 IN | OXYGEN SATURATION: 99 % | DIASTOLIC BLOOD PRESSURE: 76 MMHG

## 2020-10-20 PROCEDURE — G8417 CALC BMI ABV UP PARAM F/U: HCPCS | Performed by: FAMILY MEDICINE

## 2020-10-20 PROCEDURE — G8484 FLU IMMUNIZE NO ADMIN: HCPCS | Performed by: FAMILY MEDICINE

## 2020-10-20 PROCEDURE — 1123F ACP DISCUSS/DSCN MKR DOCD: CPT | Performed by: FAMILY MEDICINE

## 2020-10-20 PROCEDURE — G0008 ADMIN INFLUENZA VIRUS VAC: HCPCS | Performed by: FAMILY MEDICINE

## 2020-10-20 PROCEDURE — 90694 VACC AIIV4 NO PRSRV 0.5ML IM: CPT | Performed by: FAMILY MEDICINE

## 2020-10-20 PROCEDURE — 1090F PRES/ABSN URINE INCON ASSESS: CPT | Performed by: FAMILY MEDICINE

## 2020-10-20 RX ORDER — A/SINGAPORE/GP1908/2015 IVR-180 (AN A/MICHIGAN/45/2015 (H1N1)PDM09-LIKE VIRUS, A/HONG KONG/4801/2014, NYMC X-263B (H3N2) (AN A/HONG KONG/4801/2014-LIKE VIRUS), AND B/BRISBANE/60/2008, WILD TYPE (A B/BRISBANE/60/2008-LIKE VIRUS) 15; 15; 15 UG/.5ML; UG/.5ML; UG/.5ML
0.5 INJECTION, SUSPENSION INTRAMUSCULAR ONCE
COMMUNITY
Start: 2020-10-20 | End: 2020-10-20

## 2020-10-20 NOTE — PROGRESS NOTES
10/20/2020    Home visit medically necessary in lieu of an office visit due to: [facility] resident, uses walker, difficult to get out. HPI:   Patient says she continues to have joint pain especially in hands, feet and legs. She has has been using BioFreeze and taking Tylenol as needed, and both of these remedies help. She has had no falls in the past month. She always uses her rollator when ambulating. She has had no cough, fever/chills, SOB/CP. No abdominal pain, NVD or dysuria. Moving bowels regularly. She is using compression hose and elevating legs to prevent leg swelling. REVIEW OF SYSTEMS:    GENERAL: Appetite good. No weight change, generally healthy, no change in strength or exercise tolerance. CARDIOVASCULAR: No chest pains, no murmurs, no palpitations, no syncope, no orthopnea. LEGS HAVE BEEN SWELLING. RESPIRATORY: No shortness of breath, no pain with breathing, no wheezing, no hemoptysis, no cough. BREASTS:  No lumps, discharge or pain. GASTROINTESTINAL: No change in appetite, no dysphagia, no heartburn, no abdominal pains, no diarrhea, no bowel habit changes, no emesis, no melena, no hemorrhoids. CONSTIPATION SOMETIMES. GENITOURINARY: No incontinence or retention, no urinary urgency, no frequent UTIs, no dysuria, no change in nature of urine. STRESS INCONTINENCE AT TIMES. MUSCULOSKELETAL: No swelling or redness of joints, no limitation of range of motion, no weakness or numbness. HAS HAD JT PAINS BUT NOT NOW. NEURO/PSYCH: No weakness, no tremor, no seizures, no changes in mentation, no ataxia. No depressive symptoms, no changes in sleep habits, no changes in thought content. MEMORY PROBLEMS. All other systems negative.     Allergies   Allergen Reactions    Flomax [Tamsulosin Hcl] Other (See Comments)     Causes chest pain    Ace Inhibitors Swelling     Causes lip swelling    Alendronate Sodium Other (See Comments)     Pt unsure of allergies    Lasix [Furosemide] Other (See Comments) weakness    Lisinopril Rash    Penicillins Rash     Rash    Propolis Other (See Comments)    Vioxx [Rofecoxib] Other (See Comments)     Past Medical History:   Diagnosis Date    Asthma     Atrioventricular (AV) dissociation     AV block, 1st degree 2/11/2015    Diabetes mellitus ([facility] [address]     GERD (gastroesophageal reflux disease)     Herniated disc     Cervical    Hyperlipidemia     Hypertension     Mild aortic regurgitation 11/5/13    Mitral regurgitation 11/5/13    mild-moderate    Nontoxic uninodular goiter     OA (osteoarthritis of spine)     cervical spine    Obesity      Past Surgical History:   Procedure Laterality Date    A-V CARDIAC PACEMAKER INSERTION Left 06/13/2016    Dual chamber pacemaker [address] by Dr Millicent Grant History     Socioeconomic History    Marital status:      Spouse name: Not on file    Number of children: None    Years of education: Not on file    Highest education level: Not on file   Occupational History    Not on file   Tobacco Use    Smoking status: Never Smoker    Smokeless tobacco: Never Used   Substance and Sexual Activity    Alcohol use: No     Frequency: Never     Binge frequency: Never     Comment:      Drug use: No    Sexual activity: Not Currently   Social History Narrative    1 cup tea daily; occ pop; no coffee      No family history on file. PHYSICAL EXAM:   Vitals:    10/20/20 1121   BP: 139/76   Site: Left Upper Arm   Position: Sitting   Pulse: 64   Resp: 20   Temp: 97.6 °F (36.4 °C)   TempSrc: Infrared   SpO2: 99%   Weight: 194 lb (88 kg)   Height: 5' 4\" (1.626 m)     Estimated body mass index is 33.3 kg/m² as calculated from the following:    Height as of this encounter: 5' 4\" (1.626 m). Weight as of this encounter: 194 lb (88 kg). GEN:  elderly [facility] female patient seated in recliner in NAD. HEAD:  atraumatic, normocephalic.    EYES:  EOMI, PERRL, no cataracts, conjunctivae appear normal.  ENT:   Good hearing, EACs without wax, TM's normal, nasal septum midline, no significant congestion, oral cavity without lesions, poor-fair dentition, no dentures. NECK:  fair-good ROM, no palpable masses, no carotid bruits, no JVD. LUNGS:  clear to ausc, no rales, rhonchi or wheezes. HEART:  RRR, no murmurs or gallops. ABD:  soft, non-tender to palp, no palp masses or HSM, normal BS. BACK:  no scoliosis or kyphosis, non-tender to palp. EXTREMITIES: Trace bilateral LE edema with support stockings on, no ulcerations, varicosities or erythema. No gross deformities. MUSCULOSKELETAL: Good ROM of all joints, non tender to palp and or with movement. SKIN:  No ulcerations or breakdown, rash, ecchymosis, or other lesions. NEURO:  No tremor, motor UEs 5/5 LEs  5/5, sensory normal, able to stand and walk slowly using rollator with mild ataxia. PSYCH:  Pleasant and cooperative. Fluid speech, oriented to person, place and time. No delusional statements. Medications reviewed. Labs 8/21/20 GFR 52, Na 127, Glu 81, Alb 3;4   7/15/20 A1c 6.1 4/15/20 HH 14/43, Na 126, GFR 64, , HDL 58  6/11/20 COVID neg. ASSESSMENT:  Elderly female has HTN (hypertension), benign; Diabetes mellitus type 2, diet-controlled ([address] DDD (degenerative disc disease), cervical; Non-rheumatic mitral regurgitation; AI (aortic insufficiency); Pacemaker; Hyperlipidemia LDL goal <100; Acquired hypothyroidism; Syncope and collapse; Hyponatremia; Primary osteoarthritis involving multiple joints; Mild intermittent asthma without complication; History of complete heart block; Depression; Ambulatory dysfunction; Chronic fatigue; and Overactive bladder on their problem list.     Donnie was seen today for hypertension and joint pain.     Diagnoses and all orders for this visit:    HTN (hypertension), benign    Diabetes mellitus type 2, diet-controlled ([address]    Primary osteoarthritis involving multiple joints    Overactive bladder    Need for immunization against influenza    Other orders  -     INFLUENZA, QUADV, ADJUVANTED, 65 YRS =, IM, PF, PREFILL SYR, 0.5ML (FLUAD)       PLAN:  Continue to use BioFreeze on painful joints. Continue Tylenol as needed. Encouraged to drink plenty of fluids. Discussed diet and activity level. Check BMP every 3 months, next in December. Continue other meds as per Rx List. Recheck 1 month. 40 minutes spent on visit, 25 minutes involved education/counseling regarding HTN, urinary, and DJD disease processes, treatment options, meds and coordination of care. Current Outpatient Medications   Medication Sig Dispense Refill    influenza A&B vaccine (FLUAD QUADRIVALENT) 0.5 ML PRSY injection Inject 0.5 mLs into the muscle once Given L deltoid. [address] [id number]; Lt [id number]; exp 06/30/2021.  vitamin D (CHOLECALCIFEROL) 50 MCG (2000 UT) TABS tablet Take 1 tablet by mouth daily      metoprolol succinate (TOPROL XL) 50 MG extended release tablet TAKE 1 TABLET BY MOUTH DAILY 30 tablet 3    fluticasone (FLONASE) 50 MCG/ACT nasal spray 2 sprays by Each Nostril route daily      Lidocaine HCl (L-E) gel Apply topically once      metoprolol tartrate (LOPRESSOR) 25 MG tablet Take 25 mg by mouth every evening      simvastatin (ZOCOR) 10 MG tablet TAKE 1 TABLET BY MOUTH AT  BEDTIME. 30 tablet 11    calcium carbonate-vitamin D 600-200 MG-UNIT TABS Take 1 tablet by mouth 2 times daily 60 tablet 11    levothyroxine (SYNTHROID) 100 MCG tablet Take 1 tablet by mouth Daily TAKE 1 TABLET BY MOUTH IN THE MORNING 30 MINUTES BEFORE BREAKFAST OR OTHER MEDICATIONS. 90 tablet 3    spironolactone (ALDACTONE) 25 MG tablet TAKE 1 TABLET BY MOUTH DAILY 30 tablet 11    Omega-3 Fatty Acids (FISH OIL) 1200 MG CAPS TAKE 1 CAPSULE BY MOUTH IN THE MORNING.  30 capsule 11    oxybutynin (DITROPAN) 5 MG tablet TAKE 2 TABLETS BY MOUTH ONCE DAILY TAKE 1 TABLET BY MOUTH AT  BEDTIME. (Patient taking differently: Take 10 mg by mouth daily w/ 5 mg at night) 90 tablet 11    amLODIPine (NORVASC) 5 MG tablet TAKE 1 TABLET BY MOUTH AT  BEDTIME. 30 tablet 11    loratadine (CLARITIN) 10 MG tablet TAKE 1 TABLET BY MOUTH DAILY. 30 tablet 3    zoster recombinant adjuvanted vaccine (SHINGRIX) 50 MCG/0.5ML SUSR injection Inject 0.5 mLs into the muscle See Admin Instructions 1 dose now and repeat in 2-6 months 0.5 mL 0    ACETAMINOPHEN EXTRA STRENGTH 500 MG tablet TAKE 1 TABLET BY MOUTH FOUR TIMES DAILY AS NEEDED FOR PAIN. (Patient taking differently: Take 500 mg by mouth every 4 hours as needed for Pain ) 243 tablet 3    folic acid (FOLVITE) 134 MCG tablet TAKE 1 TABLET BY MOUTH IN  THE MORNING. (Patient taking differently: Take 400 mcg by mouth daily ) 30 tablet 11    metoprolol succinate (TOPROL XL) 25 MG extended release tablet Take 1 tablet by mouth nightly 30 tablet 6    ASPIR-LOW 81 MG EC tablet TAKE 1 TABLET BY MOUTH ONCE DAILY (Patient taking differently: Take 81 mg by mouth daily Do not crush or break.) 30 tablet 11     No current facility-administered medications for this visit. Return in about 1 month (around 11/20/2020). An electronic signature was used to authenticate this note.     --Margi Rivas MD on 10/20/2020 at 12:22 PM

## 2020-10-27 RX ORDER — LORATADINE 10 MG/1
TABLET ORAL
Qty: 30 TABLET | Refills: 11 | Status: SHIPPED
Start: 2020-10-27 | End: 2020-12-18 | Stop reason: SDUPTHER

## 2020-11-17 NOTE — PROGRESS NOTES
11/18/2020    Home visit medically necessary in lieu of an office visit due to: [facility] resident, uses walker, difficult to get out. HPI:   Patient says she is getting old and worn out. She continues to have joint pain especially in hands, feet and legs. She has has been using BioFreeze and taking Tylenol as needed, and both of these remedies help. She has had no falls in the past month. She always uses her rollator when ambulating. She has had no cough, fever/chills, SOB/CP. She is moving bowels regularly. She is elevating legs to prevent leg swelling but not using compression hose. REVIEW OF SYSTEMS:    GENERAL: Appetite good. No weight change, generally healthy, no change in strength or exercise tolerance. CARDIOVASCULAR: No chest pains, no murmurs, no palpitations, no syncope, no orthopnea. LEGS HAVE BEEN SWELLING. RESPIRATORY: No shortness of breath, no pain with breathing, no wheezing, no hemoptysis, no cough. BREASTS:  No lumps, discharge or pain. GASTROINTESTINAL: No change in appetite, no dysphagia, no heartburn, no abdominal pains, no diarrhea, no bowel habit changes, no emesis, no melena, no hemorrhoids. CONSTIPATION SOMETIMES. GENITOURINARY: No incontinence or retention, no urinary urgency, no frequent UTIs, no dysuria, no change in nature of urine. STRESS INCONTINENCE AT TIMES. MUSCULOSKELETAL: No swelling or redness of joints, no limitation of range of motion, no weakness or numbness. HAS HAD JT PAINS BUT NOT NOW. NEURO/PSYCH: No weakness, no tremor, no seizures, no changes in mentation, no ataxia. No depressive symptoms, no changes in sleep habits, no changes in thought content. MEMORY PROBLEMS. All other systems negative.     Allergies   Allergen Reactions    Flomax [Tamsulosin Hcl] Other (See Comments)     Causes chest pain    Ace Inhibitors Swelling     Causes lip swelling    Alendronate Sodium Other (See Comments)     Pt unsure of allergies    Lasix [Furosemide] Other (See Comments)     weakness    Lisinopril Rash    Penicillins Rash     Rash    Propolis Other (See Comments)    Vioxx [Rofecoxib] Other (See Comments)     Past Medical History:   Diagnosis Date    Asthma     Atrioventricular (AV) dissociation     AV block, 1st degree 2/11/2015    Diabetes mellitus ([facility] [address]     GERD (gastroesophageal reflux disease)     Herniated disc     Cervical    Hyperlipidemia     Hypertension     Mild aortic regurgitation 11/5/13    Mitral regurgitation 11/5/13    mild-moderate    Nontoxic uninodular goiter     OA (osteoarthritis of spine)     cervical spine    Obesity      Past Surgical History:   Procedure Laterality Date    A-V CARDIAC PACEMAKER INSERTION Left 06/13/2016    Dual chamber pacemaker [address] by Dr Kojo Le History     Socioeconomic History    Marital status:      Spouse name: Not on file    Number of children: None    Years of education: Not on file    Highest education level: Not on file   Occupational History    Not on file   Tobacco Use    Smoking status: Never Smoker    Smokeless tobacco: Never Used   Substance and Sexual Activity    Alcohol use: No     Frequency: Never     Binge frequency: Never     Comment:      Drug use: No    Sexual activity: Not Currently   Social History Narrative    1 cup tea daily; occ pop; no coffee      No family history on file. PHYSICAL EXAM:   Vitals:    11/18/20 1021   BP: (!) 143/68   Site: Right Upper Arm   Position: Sitting   Pulse: 77   Resp: 20   Temp: 97.3 °F (36.3 °C)   TempSrc: Infrared   SpO2: 95%   Weight: 193 lb 3.2 oz (87.6 kg)   Height: 5' 4\" (1.626 m)     Estimated body mass index is 33.16 kg/m² as calculated from the following:    Height as of this encounter: 5' 4\" (1.626 m). Weight as of this encounter: 193 lb 3.2 oz (87.6 kg). GEN:  elderly [facility] female patient seated in recliner in NAD. HEAD:  atraumatic, normocephalic.    EYES:  EOMI, PERRL, no cataracts, conjunctivae appear normal.  ENT:   Good hearing, EACs without wax, TM's normal, nasal septum midline, no significant congestion, oral cavity without lesions, poor-fair dentition, no dentures. NECK:  fair-good ROM, no palpable masses, no carotid bruits, no JVD. LUNGS:  clear to ausc, no rales, rhonchi or wheezes. HEART:  RRR, no murmurs or gallops. ABD:  soft, non-tender to palp, no palp masses or HSM, normal BS. BACK:  no scoliosis or kyphosis, non-tender to palp. EXTREMITIES: Trace bilateral LE edema with support stockings on, no ulcerations, varicosities or erythema. No gross deformities. MUSCULOSKELETAL: Good ROM of all joints, non tender to palp and or with movement. SKIN:  No ulcerations or breakdown, rash, ecchymosis, or other lesions. NEURO:  No tremor, motor UEs 5/5 LEs  5/5, sensory normal, able to stand and walk slowly using rollator with mild ataxia. PSYCH:  Pleasant and cooperative. Fluid speech, oriented to person, place and time. No delusional statements. Medications reviewed. Labs 8/21/20 GFR 52, Na 127, Glu 81, Alb 3;4   7/15/20 A1c 6.1 4/15/20 HH 14/43, Na 126, GFR 64, , HDL 58  6/11/20 COVID neg. ASSESSMENT:  Elderly female has HTN (hypertension), benign; Diabetes mellitus type 2, diet-controlled ([facility] [address] DDD (degenerative disc disease), cervical; Non-rheumatic mitral regurgitation; AI (aortic insufficiency); Pacemaker; Hyperlipidemia LDL goal <100; Acquired hypothyroidism; Syncope and collapse; Hyponatremia; Primary osteoarthritis involving multiple joints; Mild intermittent asthma without complication; History of complete heart block; Depression; Ambulatory dysfunction; Chronic fatigue; and Overactive bladder on their problem list.     Hettie was seen today for fatigue and hypertension. Diagnoses and all orders for this visit:    HTN (hypertension), benign  -     Basic Metabolic Panel;  Future    Primary osteoarthritis involving multiple TABLET BY MOUTH AT  BEDTIME. 30 tablet 11    zoster recombinant adjuvanted vaccine (SHINGRIX) 50 MCG/0.5ML SUSR injection Inject 0.5 mLs into the muscle See Admin Instructions 1 dose now and repeat in 2-6 months 0.5 mL 0    ACETAMINOPHEN EXTRA STRENGTH 500 MG tablet TAKE 1 TABLET BY MOUTH FOUR TIMES DAILY AS NEEDED FOR PAIN. (Patient taking differently: Take 500 mg by mouth every 4 hours as needed for Pain ) 020 tablet 3    folic acid (FOLVITE) 927 MCG tablet TAKE 1 TABLET BY MOUTH IN  THE MORNING. (Patient taking differently: Take 400 mcg by mouth daily ) 30 tablet 11    metoprolol succinate (TOPROL XL) 25 MG extended release tablet Take 1 tablet by mouth nightly 30 tablet 6    ASPIR-LOW 81 MG EC tablet TAKE 1 TABLET BY MOUTH ONCE DAILY (Patient taking differently: Take 81 mg by mouth daily Do not crush or break.) 30 tablet 11     No current facility-administered medications for this visit. Return in about 1 month (around 12/18/2020). An electronic signature was used to authenticate this note.     --Bravo Thurman MD on 11/18/2020 at 10:41 AM

## 2020-11-18 ENCOUNTER — OFFICE VISIT (OUTPATIENT)
Dept: PRIMARY CARE CLINIC | Age: 85
End: 2020-11-18
Payer: COMMERCIAL

## 2020-11-18 VITALS
HEIGHT: 64 IN | SYSTOLIC BLOOD PRESSURE: 143 MMHG | BODY MASS INDEX: 32.98 KG/M2 | OXYGEN SATURATION: 95 % | TEMPERATURE: 97.3 F | DIASTOLIC BLOOD PRESSURE: 68 MMHG | HEART RATE: 77 BPM | RESPIRATION RATE: 20 BRPM | WEIGHT: 193.2 LBS

## 2020-11-18 PROCEDURE — 1123F ACP DISCUSS/DSCN MKR DOCD: CPT | Performed by: FAMILY MEDICINE

## 2020-11-18 PROCEDURE — G8417 CALC BMI ABV UP PARAM F/U: HCPCS | Performed by: FAMILY MEDICINE

## 2020-11-18 PROCEDURE — G8484 FLU IMMUNIZE NO ADMIN: HCPCS | Performed by: FAMILY MEDICINE

## 2020-11-18 PROCEDURE — 1090F PRES/ABSN URINE INCON ASSESS: CPT | Performed by: FAMILY MEDICINE

## 2020-11-20 LAB
BUN BLDV-MCNC: 32 MG/DL
CALCIUM SERPL-MCNC: 9.3 MG/DL
CHLORIDE BLD-SCNC: 95 MMOL/L
CO2: 23 MMOL/L
CREAT SERPL-MCNC: 1 MG/DL
GFR CALCULATED: 52
GLUCOSE BLD-MCNC: 86 MG/DL
POTASSIUM SERPL-SCNC: 4.7 MMOL/L
SODIUM BLD-SCNC: 128 MMOL/L

## 2020-12-17 ENCOUNTER — OFFICE VISIT (OUTPATIENT)
Dept: PRIMARY CARE CLINIC | Age: 85
End: 2020-12-17
Payer: COMMERCIAL

## 2020-12-17 VITALS
BODY MASS INDEX: 32.98 KG/M2 | DIASTOLIC BLOOD PRESSURE: 57 MMHG | RESPIRATION RATE: 20 BRPM | HEART RATE: 59 BPM | SYSTOLIC BLOOD PRESSURE: 112 MMHG | WEIGHT: 193.2 LBS | HEIGHT: 64 IN | TEMPERATURE: 97.3 F | OXYGEN SATURATION: 93 %

## 2020-12-17 PROCEDURE — G8484 FLU IMMUNIZE NO ADMIN: HCPCS | Performed by: FAMILY MEDICINE

## 2020-12-17 PROCEDURE — 1123F ACP DISCUSS/DSCN MKR DOCD: CPT | Performed by: FAMILY MEDICINE

## 2020-12-17 PROCEDURE — 1090F PRES/ABSN URINE INCON ASSESS: CPT | Performed by: FAMILY MEDICINE

## 2020-12-17 PROCEDURE — G8417 CALC BMI ABV UP PARAM F/U: HCPCS | Performed by: FAMILY MEDICINE

## 2020-12-17 RX ORDER — ZINC SULFATE 50(220)MG
50 CAPSULE ORAL DAILY
Qty: 30 CAPSULE | Refills: 3 | COMMUNITY
Start: 2020-12-17 | End: 2021-04-15 | Stop reason: ALTCHOICE

## 2020-12-17 RX ORDER — IVERMECTIN 3 MG/1
TABLET ORAL
Qty: 12 TABLET | Refills: 2 | Status: SHIPPED
Start: 2020-12-17 | End: 2021-01-15 | Stop reason: ALTCHOICE

## 2020-12-17 RX ORDER — MULTIVIT WITH MINERALS/LUTEIN
1000 TABLET ORAL DAILY
Qty: 30 TABLET | Refills: 3 | Status: SHIPPED
Start: 2020-12-17 | End: 2021-04-15 | Stop reason: ALTCHOICE

## 2020-12-17 NOTE — PROGRESS NOTES
12/17/2020    Home visit medically necessary in lieu of an office visit due to: [facility] resident, uses walker, difficult to get out. HPI:   Patient says she is getting old and worn out. She reports her joint pain in hands, feet and legs is about the same. She has has been using BioFreeze and taking Tylenol as needed, and these remedies do help. She has had no falls in the past month. She always uses her rollator when ambulating. She has had no cough, fever/chills, SOB/CP. She is moving bowels regularly. She c/o urinary incontinence. She is elevating legs to prevent leg swelling but not using compression hose. REVIEW OF SYSTEMS:    GENERAL: Appetite good. No weight change, generally healthy, no change in strength or exercise tolerance. CARDIOVASCULAR: No chest pains, no murmurs, no palpitations, no syncope, no orthopnea. LEGS HAVE BEEN SWELLING. RESPIRATORY: No shortness of breath, no pain with breathing, no wheezing, no hemoptysis, no cough. BREASTS:  No lumps, discharge or pain. GASTROINTESTINAL: No change in appetite, no dysphagia, no heartburn, no abdominal pains, no diarrhea, no bowel habit changes, no emesis, no melena, no hemorrhoids. CONSTIPATION SOMETIMES. GENITOURINARY: No incontinence or retention, no urinary urgency, no frequent UTIs, no dysuria, no change in nature of urine. STRESS INCONTINENCE AT TIMES. MUSCULOSKELETAL: No swelling or redness of joints, no limitation of range of motion, no weakness or numbness. HAS HAD JT PAINS BUT NOT NOW. NEURO/PSYCH: No weakness, no tremor, no seizures, no changes in mentation, no ataxia. No depressive symptoms, no changes in sleep habits, no changes in thought content. MEMORY PROBLEMS. All other systems negative.     Allergies   Allergen Reactions    Flomax [Tamsulosin Hcl] Other (See Comments)     Causes chest pain    Ace Inhibitors Swelling     Causes lip swelling    Alendronate Sodium Other (See Comments)     Pt unsure of allergies  Lasix [Furosemide] Other (See Comments)     weakness    Lisinopril Rash    Penicillins Rash     Rash    Propolis Other (See Comments)    Vioxx [Rofecoxib] Other (See Comments)     Past Medical History:   Diagnosis Date    Asthma     Atrioventricular (AV) dissociation     AV block, 1st degree 2/11/2015    Diabetes mellitus ([facility] [address]     GERD (gastroesophageal reflux disease)     Herniated disc     Cervical    Hyperlipidemia     Hypertension     Mild aortic regurgitation 11/5/13    Mitral regurgitation 11/5/13    mild-moderate    Nontoxic uninodular goiter     OA (osteoarthritis of spine)     cervical spine    Obesity      Past Surgical History:   Procedure Laterality Date    A-V CARDIAC PACEMAKER INSERTION Left 06/13/2016    Dual chamber pacemaker [address] by Dr Moss Mix History     Socioeconomic History    Marital status:      Spouse name: Not on file    Number of children: None    Years of education: Not on file    Highest education level: Not on file   Occupational History    Not on file   Tobacco Use    Smoking status: Never Smoker    Smokeless tobacco: Never Used   Substance and Sexual Activity    Alcohol use: No     Frequency: Never     Binge frequency: Never     Comment:      Drug use: No    Sexual activity: Not Currently   Social History Narrative    1 cup tea daily; occ pop; no coffee      No family history on file. PHYSICAL EXAM:   Vitals:    12/17/20 1034   BP: (!) 112/57   Site: Left Wrist   Position: Sitting   Pulse: 59   Resp: 20   Temp: 97.3 °F (36.3 °C)   TempSrc: Temporal   SpO2: 93%   Weight: 193 lb 3.2 oz (87.6 kg)   Height: 5' 4\" (1.626 m)     Estimated body mass index is 33.16 kg/m² as calculated from the following:    Height as of this encounter: 5' 4\" (1.626 m). Weight as of this encounter: 193 lb 3.2 oz (87.6 kg). GEN:  elderly [facility] female patient seated in recliner in NAD. HEAD:  atraumatic, normocephalic. EYES:  EOMI, PERRL, no cataracts, conjunctivae appear normal.  ENT:   Good hearing, EACs without wax, TM's normal, nasal septum midline, no significant congestion, oral cavity without lesions, poor-fair dentition, no dentures. NECK:  fair-good ROM, no palpable masses, no carotid bruits, no JVD. LUNGS:  clear to ausc, no rales, rhonchi or wheezes. HEART:  RRR, no murmurs or gallops. ABD:  soft, non-tender to palp, no palp masses or HSM, normal BS. BACK:  no scoliosis or kyphosis, non-tender to palp. EXTREMITIES: Trace bilateral LE edema with support stockings on, no ulcerations, varicosities or erythema. No gross deformities. MUSCULOSKELETAL: Good ROM of all joints, non tender to palp and or with movement. SKIN:  No ulcerations or breakdown, rash, ecchymosis, or other lesions. NEURO:  No tremor, motor UEs 5/5 LEs  5/5, sensory normal, able to stand and walk slowly using rollator with mild ataxia. PSYCH:  Pleasant and cooperative. Fluid speech, oriented to person, place and time. No delusional statements. Medications reviewed. Labs GFR 52, Na 128, lytes o/w nl  8/21/20 GFR 52, Na 127, Glu 81, Alb 3;4   7/15/20 A1c 6.1 4/15/20 HH 14/43, Na 126, GFR 64, , HDL 58  6/11/20 COVID neg. ASSESSMENT:  Elderly female has HTN (hypertension), benign; Diabetes mellitus type 2, diet-controlled ([facility] [address] DDD (degenerative disc disease), cervical; Non-rheumatic mitral regurgitation; AI (aortic insufficiency); Pacemaker; Hyperlipidemia LDL goal <100; Acquired hypothyroidism; Syncope and collapse; Hyponatremia; Primary osteoarthritis involving multiple joints; Mild intermittent asthma without complication; History of complete heart block; Depression; Ambulatory dysfunction; Chronic fatigue; and Overactive bladder on their problem list.     Hettie was seen today for hypertension and joint pain. Diagnoses and all orders for this visit:    HTN (hypertension), benign    Primary osteoarthritis involving multiple joints    DDD (degenerative disc disease), cervical    Other orders  -     ivermectin 3 MG tablet; Give 6 tabs now, repeat in 2 days. Then give 6 tabs monthly on the 17th.  -     zinc sulfate (ORAZINC) 220 (50 Zn) MG capsule; Take 1 capsule by mouth daily  -     Ascorbic Acid (VITAMIN C) 1000 MG tablet; Take 1 tablet by mouth daily       PLAN:  Discussed Kegel exercises. COVID prophylaxis. Change to Reg diet. Continue to use BioFreeze on painful joints. Continue Tylenol as needed. Encouraged to drink plenty of fluids. Discussed diet and activity level. Check BMP every 3 months, next in February. Continue other meds as per Rx List. Recheck 1 month. 40 minutes spent on visit, 25 minutes involved education/counseling regarding HTN, urinary, and DJD disease processes, treatment options, meds and coordination of care. Current Outpatient Medications   Medication Sig Dispense Refill    ivermectin 3 MG tablet Give 6 tabs now, repeat in 2 days. Then give 6 tabs monthly on the 17th. 12 tablet 2    zinc sulfate (ORAZINC) 220 (50 Zn) MG capsule Take 1 capsule by mouth daily 30 capsule 3    Ascorbic Acid (VITAMIN C) 1000 MG tablet Take 1 tablet by mouth daily 30 tablet 3    Incontinence Supply Disposable MISC 2 each by Does not apply route as needed (Incontinence) Use prn. Dispense 2 cases. 11 Refills. Size large 2 each 11    loratadine (CLARITIN) 10 MG tablet TAKE 1 TABLET BY MOUTH DAILY.  30 tablet 11    vitamin D (CHOLECALCIFEROL) 50 MCG (2000 UT) TABS tablet Take 1 tablet by mouth daily      metoprolol succinate (TOPROL XL) 50 MG extended release tablet TAKE 1 TABLET BY MOUTH DAILY 30 tablet 3    fluticasone (FLONASE) 50 MCG/ACT nasal spray 2 sprays by Each Nostril route daily      Lidocaine HCl (L-E) gel Apply topically once  simvastatin (ZOCOR) 10 MG tablet TAKE 1 TABLET BY MOUTH AT  BEDTIME. 30 tablet 11    calcium carbonate-vitamin D 600-200 MG-UNIT TABS Take 1 tablet by mouth 2 times daily 60 tablet 11    levothyroxine (SYNTHROID) 100 MCG tablet Take 1 tablet by mouth Daily TAKE 1 TABLET BY MOUTH IN THE MORNING 30 MINUTES BEFORE BREAKFAST OR OTHER MEDICATIONS. 90 tablet 3    spironolactone (ALDACTONE) 25 MG tablet TAKE 1 TABLET BY MOUTH DAILY 30 tablet 11    Omega-3 Fatty Acids (FISH OIL) 1200 MG CAPS TAKE 1 CAPSULE BY MOUTH IN THE MORNING. 30 capsule 11    oxybutynin (DITROPAN) 5 MG tablet TAKE 2 TABLETS BY MOUTH ONCE DAILY TAKE 1 TABLET BY MOUTH AT  BEDTIME. (Patient taking differently: Take 10 mg by mouth daily w/ 5 mg at night) 90 tablet 11    amLODIPine (NORVASC) 5 MG tablet TAKE 1 TABLET BY MOUTH AT  BEDTIME. 30 tablet 11    zoster recombinant adjuvanted vaccine (SHINGRIX) 50 MCG/0.5ML SUSR injection Inject 0.5 mLs into the muscle See Admin Instructions 1 dose now and repeat in 2-6 months 0.5 mL 0    ACETAMINOPHEN EXTRA STRENGTH 500 MG tablet TAKE 1 TABLET BY MOUTH FOUR TIMES DAILY AS NEEDED FOR PAIN. (Patient taking differently: Take 500 mg by mouth every 4 hours as needed for Pain ) 404 tablet 3    folic acid (FOLVITE) 769 MCG tablet TAKE 1 TABLET BY MOUTH IN  THE MORNING. (Patient taking differently: Take 400 mcg by mouth daily ) 30 tablet 11    metoprolol succinate (TOPROL XL) 25 MG extended release tablet Take 1 tablet by mouth nightly 30 tablet 6    ASPIR-LOW 81 MG EC tablet TAKE 1 TABLET BY MOUTH ONCE DAILY (Patient taking differently: Take 81 mg by mouth daily Do not crush or break.) 30 tablet 11     No current facility-administered medications for this visit. Return in about 1 month (around 1/17/2021). An electronic signature was used to authenticate this note.     --Anita Coley MD on 12/17/2020 at 11:02 AM

## 2020-12-18 RX ORDER — LORATADINE 10 MG/1
TABLET ORAL
Qty: 30 TABLET | Refills: 11 | Status: SHIPPED
Start: 2020-12-18 | End: 2021-04-15 | Stop reason: DRUGHIGH

## 2020-12-22 RX ORDER — ASPIRIN 81 MG/1
TABLET ORAL
Qty: 30 TABLET | Refills: 11 | Status: SHIPPED | OUTPATIENT
Start: 2020-12-22

## 2021-01-11 DIAGNOSIS — Z76.0 MEDICATION REFILL: ICD-10-CM

## 2021-01-11 RX ORDER — ACETAMINOPHEN 500 MG
TABLET ORAL
Qty: 120 TABLET | Refills: 11 | Status: SHIPPED | OUTPATIENT
Start: 2021-01-11

## 2021-01-14 NOTE — PROGRESS NOTES
1/15/2021    Home visit medically necessary in lieu of an office visit due to: [facility] resident, uses walker, difficult to get out. HPI:   Patient says she is doing pretty good. She had her 2nd [address] yesterday and feels fine. She reports her joint pain in hands, feet and legs is okay using BioFreeze and taking Tylenol as needed. She has had no falls in the past month. She always uses her rollator when ambulating. She has had no cough, fever/chills, SOB/CP. She moves her bowels regularly. She c/o urinary incontinence. She is trying to elevate her legs to prevent leg swelling; not using compression hose. REVIEW OF SYSTEMS:    GENERAL: Appetite good. No weight change, generally healthy, no change in strength or exercise tolerance. CARDIOVASCULAR: No chest pains, no murmurs, no palpitations, no syncope, no orthopnea. LEGS HAVE BEEN SWELLING. RESPIRATORY: No shortness of breath, no pain with breathing, no wheezing, no hemoptysis, no cough. BREASTS:  No lumps, discharge or pain. GASTROINTESTINAL: No change in appetite, no dysphagia, no heartburn, no abdominal pains, no diarrhea, no bowel habit changes, no emesis, no melena, no hemorrhoids. CONSTIPATION SOMETIMES. GENITOURINARY: No incontinence or retention, no urinary urgency, no frequent UTIs, no dysuria, no change in nature of urine. STRESS INCONTINENCE AT TIMES. MUSCULOSKELETAL: No swelling or redness of joints, no limitation of range of motion, no weakness or numbness. HAS HAD JT PAINS BUT NOT NOW. NEURO/PSYCH: No weakness, no tremor, no seizures, no changes in mentation, no ataxia. No depressive symptoms, no changes in sleep habits, no changes in thought content. MEMORY PROBLEMS. All other systems negative.     Allergies   Allergen Reactions    Flomax [Tamsulosin Hcl] Other (See Comments)     Causes chest pain    Ace Inhibitors Swelling     Causes lip swelling    Alendronate Sodium Other (See Comments)     Pt unsure of allergies    Lasix [Furosemide] Other (See Comments)     weakness    Lisinopril Rash    Penicillins Rash     Rash    Propolis Other (See Comments)    Vioxx [Rofecoxib] Other (See Comments)     Past Medical History:   Diagnosis Date    Asthma     Atrioventricular (AV) dissociation     AV block, 1st degree 2/11/2015    Diabetes mellitus ([address]     GERD (gastroesophageal reflux disease)     Herniated disc     Cervical    Hyperlipidemia     Hypertension     Mild aortic regurgitation 11/5/13    Mitral regurgitation 11/5/13    mild-moderate    Nontoxic uninodular goiter     OA (osteoarthritis of spine)     cervical spine    Obesity      Past Surgical History:   Procedure Laterality Date    A-V CARDIAC PACEMAKER INSERTION Left 06/13/2016    Dual chamber pacemaker [address] by Dr Alvaro Corral History     Socioeconomic History    Marital status:      Spouse name: Not on file    Number of children: None    Years of education: Not on file    Highest education level: Not on file   Occupational History    Not on file   Tobacco Use    Smoking status: Never Smoker    Smokeless tobacco: Never Used   Substance and Sexual Activity    Alcohol use: No     Frequency: Never     Binge frequency: Never     Comment:      Drug use: No    Sexual activity: Not Currently   Social History Narrative    1 cup tea daily; occ pop; no coffee      No family history on file. PHYSICAL EXAM:   Vitals:    01/15/21 1004   BP: 126/77   Site: Right Upper Arm   Position: Sitting   Pulse: 73   Resp: 20   Temp: 97.6 °F (36.4 °C)   TempSrc: Temporal   SpO2: 98%   Weight: 193 lb 9.6 oz (87.8 kg)   Height: 5' 4\" (1.626 m)     Estimated body mass index is 33.23 kg/m² as calculated from the following:    Height as of this encounter: 5' 4\" (1.626 m). Weight as of this encounter: 193 lb 9.6 oz (87.8 kg). GEN:  elderly [facility] female patient seated in recliner in NAD. HEAD:  atraumatic, normocephalic. EYES:  EOMI, PERRL, no cataracts, conjunctivae appear normal.  ENT:   Good hearing, EACs without wax, TM's normal, nasal septum midline, no significant congestion, oral cavity without lesions, poor-fair dentition, no dentures. NECK:  fair-good ROM, no palpable masses, no carotid bruits, no JVD. LUNGS:  clear to ausc, no rales, rhonchi or wheezes. HEART:  RRR, no murmurs or gallops. ABD:  soft, non-tender to palp, no palp masses or HSM, normal BS. BACK:  no scoliosis or kyphosis, non-tender to palp. EXTREMITIES: Trace bilateral LE edema with support stockings on, no ulcerations, varicosities or erythema. No gross deformities. MUSCULOSKELETAL: Good ROM of all joints, non tender to palp and or with movement. SKIN:  No ulcerations or breakdown, rash, ecchymosis, or other lesions. NEURO:  No tremor, motor UEs 5/5 LEs  5/5, sensory normal, able to stand and walk slowly using rollator with mild ataxia. PSYCH:  Pleasant and cooperative. Fluid speech, oriented to person, place and time. No delusional statements. Medications reviewed. Labs 11/20/20 GFR 52, Na 128, lytes o/w nl  8/21/20 GFR 52, Na 127, Glu 81, Alb 3;4   7/15/20 A1c 6.1 4/15/20 HH 14/43, Na 126, GFR 64, , HDL 58  6/11/20 COVID neg. ASSESSMENT:  Elderly female has HTN (hypertension), benign; Diabetes mellitus type 2, diet-controlled ([address] DDD (degenerative disc disease), cervical; Non-rheumatic mitral regurgitation; AI (aortic insufficiency); Pacemaker; Hyperlipidemia LDL goal <100; Acquired hypothyroidism; Syncope and collapse; Hyponatremia; Primary osteoarthritis involving multiple joints; Mild intermittent asthma without complication; History of complete heart block; Depression; Ambulatory dysfunction; Chronic fatigue; and Overactive bladder on their problem list.     Hettie was seen today for hypertension and urinary frequency.     Diagnoses and all orders for this visit:    HTN (hypertension), benign    Diabetes mellitus type 2, diet-controlled ([facility] [address]    Primary osteoarthritis involving multiple joints    Chronic fatigue    Overactive bladder       PLAN:  Discussed Kegel exercises again. D/C oxybutynin. Continue to use BioFreeze on painful joints. Continue Tylenol as needed. Encouraged to drink plenty of fluids. Discussed diet and activity level. Check BMP every 3 months, next in February. Continue other meds as per Rx List. Recheck 1 month. 40 minutes spent on visit, 25 minutes involved education/counseling regarding HTN, urinary, and DJD disease processes, treatment options, meds and coordination of care. Current Outpatient Medications   Medication Sig Dispense Refill    acetaminophen (ACETAMINOPHEN EXTRA STRENGTH) 500 MG tablet TAKE 1 TABLET BY MOUTH FOUR TIMES DAILY AS NEEDED FOR PAIN. 120 tablet 11    aspirin (ASPIR-LOW) 81 MG EC tablet TAKE 1 TABLET BY MOUTH ONCE DAILY 30 tablet 11    loratadine (CLARITIN) 10 MG tablet TAKE 1 TABLET BY MOUTH DAILY. 30 tablet 11    zinc sulfate (ORAZINC) 220 (50 Zn) MG capsule Take 1 capsule by mouth daily 30 capsule 3    Ascorbic Acid (VITAMIN C) 1000 MG tablet Take 1 tablet by mouth daily 30 tablet 3    Incontinence Supply Disposable MISC 2 each by Does not apply route as needed (Incontinence) Use prn. Dispense 2 cases. 11 Refills. Size large 2 each 11    vitamin D (CHOLECALCIFEROL) 50 MCG (2000 UT) TABS tablet Take 1 tablet by mouth daily      metoprolol succinate (TOPROL XL) 50 MG extended release tablet TAKE 1 TABLET BY MOUTH DAILY 30 tablet 3    fluticasone (FLONASE) 50 MCG/ACT nasal spray 2 sprays by Each Nostril route daily      Lidocaine HCl (L-E) gel Apply topically once      simvastatin (ZOCOR) 10 MG tablet TAKE 1 TABLET BY MOUTH AT  BEDTIME.  30 tablet 11    calcium carbonate-vitamin D 600-200 MG-UNIT TABS Take 1 tablet by mouth 2 times daily 60 tablet 11    levothyroxine (SYNTHROID) 100 MCG tablet Take 1 tablet by mouth Daily TAKE 1 TABLET BY MOUTH IN THE MORNING 30 MINUTES

## 2021-01-15 ENCOUNTER — OFFICE VISIT (OUTPATIENT)
Dept: PRIMARY CARE CLINIC | Age: 86
End: 2021-01-15
Payer: COMMERCIAL

## 2021-01-15 VITALS
HEIGHT: 64 IN | TEMPERATURE: 97.6 F | OXYGEN SATURATION: 98 % | DIASTOLIC BLOOD PRESSURE: 77 MMHG | BODY MASS INDEX: 33.05 KG/M2 | SYSTOLIC BLOOD PRESSURE: 126 MMHG | RESPIRATION RATE: 20 BRPM | HEART RATE: 73 BPM | WEIGHT: 193.6 LBS

## 2021-01-15 DIAGNOSIS — E11.9 DIABETES MELLITUS TYPE 2, DIET-CONTROLLED (HCC): Chronic | ICD-10-CM

## 2021-01-15 DIAGNOSIS — R53.82 CHRONIC FATIGUE: ICD-10-CM

## 2021-01-15 DIAGNOSIS — I10 HTN (HYPERTENSION), BENIGN: Primary | Chronic | ICD-10-CM

## 2021-01-15 DIAGNOSIS — M15.9 PRIMARY OSTEOARTHRITIS INVOLVING MULTIPLE JOINTS: ICD-10-CM

## 2021-01-15 DIAGNOSIS — N32.81 OVERACTIVE BLADDER: ICD-10-CM

## 2021-01-15 PROCEDURE — G8417 CALC BMI ABV UP PARAM F/U: HCPCS | Performed by: FAMILY MEDICINE

## 2021-01-15 PROCEDURE — 1123F ACP DISCUSS/DSCN MKR DOCD: CPT | Performed by: FAMILY MEDICINE

## 2021-01-15 PROCEDURE — 1090F PRES/ABSN URINE INCON ASSESS: CPT | Performed by: FAMILY MEDICINE

## 2021-01-15 PROCEDURE — G8484 FLU IMMUNIZE NO ADMIN: HCPCS | Performed by: FAMILY MEDICINE

## 2021-01-15 RX ORDER — IVERMECTIN 3 MG/1
TABLET ORAL
Qty: 6 TABLET | Refills: 2 | OUTPATIENT
Start: 2021-01-15

## 2021-02-08 ENCOUNTER — TELEPHONE (OUTPATIENT)
Dept: PRIMARY CARE CLINIC | Age: 86
End: 2021-02-08

## 2021-02-08 RX ORDER — IPRATROPIUM BROMIDE 42 UG/1
2 SPRAY, METERED NASAL 4 TIMES DAILY PRN
Qty: 1 BOTTLE | Refills: 5 | Status: SHIPPED | OUTPATIENT
Start: 2021-02-08

## 2021-02-08 NOTE — TELEPHONE ENCOUNTER
Fax from [address]. Donnie c/o nose dripping. Afebrile. No other symptoms. Would like something prn.

## 2021-02-18 NOTE — PROGRESS NOTES
 Lasix [Furosemide] Other (See Comments)     weakness    Lisinopril Rash    Penicillins Rash     Rash    Propolis Other (See Comments)    Vioxx [Rofecoxib] Other (See Comments)     Past Medical History:   Diagnosis Date    Asthma     Atrioventricular (AV) dissociation     AV block, 1st degree 2/11/2015    Diabetes mellitus ([facility] [address]     GERD (gastroesophageal reflux disease)     Herniated disc     Cervical    Hyperlipidemia     Hypertension     Mild aortic regurgitation 11/5/13    Mitral regurgitation 11/5/13    mild-moderate    Nontoxic uninodular goiter     OA (osteoarthritis of spine)     cervical spine    Obesity      Past Surgical History:   Procedure Laterality Date    A-V CARDIAC PACEMAKER INSERTION Left 06/13/2016    Dual chamber pacemaker [address] by Dr Conner Sergeant History     Socioeconomic History    Marital status:      Spouse name: Not on file    Number of children: None    Years of education: Not on file    Highest education level: Not on file   Occupational History    Not on file   Tobacco Use    Smoking status: Never Smoker    Smokeless tobacco: Never Used   Substance and Sexual Activity    Alcohol use: No     Frequency: Never     Binge frequency: Never     Comment:      Drug use: No    Sexual activity: Not Currently   Social History Narrative    1 cup tea daily; occ pop; no coffee      No family history on file. PHYSICAL EXAM:   Vitals:    02/19/21 1020   BP: 130/74   Site: Left Wrist   Position: Sitting   Pulse: 83   Resp: 20   Temp: 96 °F (35.6 °C)   TempSrc: Temporal   SpO2: 98%   Weight: 197 lb 12.8 oz (89.7 kg)   Height: 5' 4\" (1.626 m)     Estimated body mass index is 33.95 kg/m² as calculated from the following:    Height as of this encounter: 5' 4\" (1.626 m). Weight as of this encounter: 197 lb 12.8 oz (89.7 kg). GEN:  elderly [facility]DW[facility] female patient seated in recliner in NAD.    HEAD:  atraumatic, normocephalic. EYES:  EOMI, PERRL, no cataracts, conjunctivae appear normal.  ENT:   Good hearing, EACs without wax, TM's normal, nasal septum midline, no significant congestion, oral cavity without lesions, poor-fair dentition, no dentures. NECK:  fair-good ROM, no palpable masses, no carotid bruits, no JVD. LUNGS:  clear to ausc, no rales, rhonchi or wheezes. HEART:  RRR, no murmurs or gallops. ABD:  soft, non-tender to palp, no palp masses or HSM, normal BS. BACK:  no scoliosis or kyphosis, non-tender to palp. EXTREMITIES: Trace bilateral LE edema with support stockings on, no ulcerations, varicosities or erythema. No gross deformities. MUSCULOSKELETAL: Good ROM of all joints, non tender to palp and or with movement. SKIN:  No ulcerations or breakdown, rash, ecchymosis, or other lesions. NEURO:  No tremor, motor UEs 5/5 LEs  5/5, sensory normal, able to stand and walk slowly using rollator with mild ataxia. PSYCH:  Pleasant and cooperative. Fluid speech, oriented to person, place and time. No delusional statements. Medications reviewed. Labs 11/20/20 GFR 52, Na 128, lytes o/w nl  8/21/20 GFR 52, Na 127, Glu 81, Alb 3;4   7/15/20 A1c 6.1 4/15/20 HH 14/43, Na 126, GFR 64, , HDL 58  6/11/20 COVID neg. ASSESSMENT:  Elderly female has HTN (hypertension), benign; Diabetes mellitus type 2, diet-controlled ([address] DDD (degenerative disc disease), cervical; Non-rheumatic mitral regurgitation; AI (aortic insufficiency); Pacemaker; Hyperlipidemia LDL goal <100; Acquired hypothyroidism; Syncope and collapse; Hyponatremia; Primary osteoarthritis involving multiple joints; Mild intermittent asthma without complication; History of complete heart block; Depression; Ambulatory dysfunction; Chronic fatigue; and Overactive bladder on their problem list.     Donnie was seen today for memory loss and hypertension.     Diagnoses and all orders for this visit:    HTN (hypertension), benign  -     Basic Metabolic Panel; Future    Primary osteoarthritis involving multiple joints    DDD (degenerative disc disease), cervical    Overactive bladder       PLAN:  Check labs. Continue Kegel exercises. Continue to use BioFreeze on painful joints. Continue Tylenol as needed. Encouraged to drink plenty of fluids. Discussed diet and activity level. Check BMP every 3 months, next in May. Continue other meds as per Rx List. Recheck 1 month. 40 minutes spent on visit, 25 minutes involved education/counseling regarding HTN, urinary, and DJD disease processes, treatment options, meds and coordination of care. Current Outpatient Medications   Medication Sig Dispense Refill    ipratropium (ATROVENT) 0.06 % nasal spray 2 sprays by Each Nostril route 4 times daily as needed for Rhinitis 1 Bottle 5    acetaminophen (ACETAMINOPHEN EXTRA STRENGTH) 500 MG tablet TAKE 1 TABLET BY MOUTH FOUR TIMES DAILY AS NEEDED FOR PAIN. 120 tablet 11    aspirin (ASPIR-LOW) 81 MG EC tablet TAKE 1 TABLET BY MOUTH ONCE DAILY 30 tablet 11    loratadine (CLARITIN) 10 MG tablet TAKE 1 TABLET BY MOUTH DAILY. 30 tablet 11    zinc sulfate (ORAZINC) 220 (50 Zn) MG capsule Take 1 capsule by mouth daily 30 capsule 3    Ascorbic Acid (VITAMIN C) 1000 MG tablet Take 1 tablet by mouth daily 30 tablet 3    Incontinence Supply Disposable MISC 2 each by Does not apply route as needed (Incontinence) Use prn. Dispense 2 cases. 11 Refills. Size large 2 each 11    vitamin D (CHOLECALCIFEROL) 50 MCG (2000 UT) TABS tablet Take 1 tablet by mouth daily      metoprolol succinate (TOPROL XL) 50 MG extended release tablet TAKE 1 TABLET BY MOUTH DAILY 30 tablet 3    fluticasone (FLONASE) 50 MCG/ACT nasal spray 2 sprays by Each Nostril route daily      Lidocaine HCl (L-E) gel Apply topically once      simvastatin (ZOCOR) 10 MG tablet TAKE 1 TABLET BY MOUTH AT  BEDTIME.  30 tablet 11    calcium carbonate-vitamin D 600-200 MG-UNIT TABS Take 1 tablet by mouth 2 times daily 60 tablet 11    levothyroxine (SYNTHROID) 100 MCG tablet Take 1 tablet by mouth Daily TAKE 1 TABLET BY MOUTH IN THE MORNING 30 MINUTES BEFORE BREAKFAST OR OTHER MEDICATIONS. 90 tablet 3    spironolactone (ALDACTONE) 25 MG tablet TAKE 1 TABLET BY MOUTH DAILY 30 tablet 11    Omega-3 Fatty Acids (FISH OIL) 1200 MG CAPS TAKE 1 CAPSULE BY MOUTH IN THE MORNING. 30 capsule 11    amLODIPine (NORVASC) 5 MG tablet TAKE 1 TABLET BY MOUTH AT  BEDTIME. 30 tablet 11    folic acid (FOLVITE) 810 MCG tablet TAKE 1 TABLET BY MOUTH IN  THE MORNING. (Patient taking differently: Take 400 mcg by mouth daily ) 30 tablet 11    metoprolol succinate (TOPROL XL) 25 MG extended release tablet Take 1 tablet by mouth nightly 30 tablet 6     No current facility-administered medications for this visit. Return in about 1 month (around 3/19/2021). An electronic signature was used to authenticate this note.     --Ethel Senior MD on 2/19/2021 at 10:40 AM

## 2021-02-19 ENCOUNTER — OFFICE VISIT (OUTPATIENT)
Dept: PRIMARY CARE CLINIC | Age: 86
End: 2021-02-19
Payer: COMMERCIAL

## 2021-02-19 VITALS
SYSTOLIC BLOOD PRESSURE: 130 MMHG | TEMPERATURE: 96 F | WEIGHT: 197.8 LBS | DIASTOLIC BLOOD PRESSURE: 74 MMHG | HEART RATE: 83 BPM | BODY MASS INDEX: 33.77 KG/M2 | RESPIRATION RATE: 20 BRPM | HEIGHT: 64 IN | OXYGEN SATURATION: 98 %

## 2021-02-19 DIAGNOSIS — M50.30 DDD (DEGENERATIVE DISC DISEASE), CERVICAL: ICD-10-CM

## 2021-02-19 DIAGNOSIS — M15.9 PRIMARY OSTEOARTHRITIS INVOLVING MULTIPLE JOINTS: ICD-10-CM

## 2021-02-19 DIAGNOSIS — N32.81 OVERACTIVE BLADDER: ICD-10-CM

## 2021-02-19 DIAGNOSIS — I10 HTN (HYPERTENSION), BENIGN: Primary | Chronic | ICD-10-CM

## 2021-02-19 PROCEDURE — 1090F PRES/ABSN URINE INCON ASSESS: CPT | Performed by: FAMILY MEDICINE

## 2021-02-19 PROCEDURE — G8417 CALC BMI ABV UP PARAM F/U: HCPCS | Performed by: FAMILY MEDICINE

## 2021-02-19 PROCEDURE — 1123F ACP DISCUSS/DSCN MKR DOCD: CPT | Performed by: FAMILY MEDICINE

## 2021-02-19 PROCEDURE — G8484 FLU IMMUNIZE NO ADMIN: HCPCS | Performed by: FAMILY MEDICINE

## 2021-02-22 LAB
BUN BLDV-MCNC: 29 MG/DL
CALCIUM SERPL-MCNC: 9.3 MG/DL
CHLORIDE BLD-SCNC: 95 MMOL/L
CO2: 24 MMOL/L
CREAT SERPL-MCNC: 1 MG/DL
GFR CALCULATED: 52
GLUCOSE BLD-MCNC: 29 MG/DL
POTASSIUM SERPL-SCNC: 5.1 MMOL/L
SODIUM BLD-SCNC: 130 MMOL/L

## 2021-02-23 DIAGNOSIS — I10 HTN (HYPERTENSION), BENIGN: Chronic | ICD-10-CM

## 2021-02-24 RX ORDER — METOPROLOL SUCCINATE 25 MG/1
TABLET, EXTENDED RELEASE ORAL
Qty: 90 TABLET | Refills: 3 | Status: SHIPPED
Start: 2021-02-24 | End: 2021-11-08 | Stop reason: SDUPTHER

## 2021-03-03 ENCOUNTER — TELEPHONE (OUTPATIENT)
Dept: PRIMARY CARE CLINIC | Age: 86
End: 2021-03-03

## 2021-03-03 DIAGNOSIS — R26.2 AMBULATORY DYSFUNCTION: ICD-10-CM

## 2021-03-03 DIAGNOSIS — M50.30 DDD (DEGENERATIVE DISC DISEASE), CERVICAL: Primary | ICD-10-CM

## 2021-03-03 DIAGNOSIS — M15.9 PRIMARY OSTEOARTHRITIS INVOLVING MULTIPLE JOINTS: ICD-10-CM

## 2021-03-03 NOTE — TELEPHONE ENCOUNTER
[facility] sent a fax requesting a script for a lift chair her current chair is broken (script attached for signature). They are also requesting an order for PT/OT to eval for lift chair. If in agreement will you sign attached script and then give us a referral/script for PT/Ot eval for lift chair?

## 2021-03-09 RX ORDER — METOPROLOL SUCCINATE 50 MG/1
TABLET, EXTENDED RELEASE ORAL
Qty: 30 TABLET | Refills: 7 | Status: SHIPPED | OUTPATIENT
Start: 2021-03-09

## 2021-03-09 RX ORDER — METOPROLOL SUCCINATE 50 MG/1
TABLET, EXTENDED RELEASE ORAL
Qty: 30 TABLET | Refills: 7 | Status: CANCELLED | OUTPATIENT
Start: 2021-03-09

## 2021-03-09 NOTE — TELEPHONE ENCOUNTER
Spoke to Inaaya. Her Cardiologist just renewed both of them within the last few weeks. Please disregard. She is on both 50 and 25mg doses.

## 2021-03-11 ENCOUNTER — TELEPHONE (OUTPATIENT)
Dept: PRIMARY CARE CLINIC | Age: 86
End: 2021-03-11

## 2021-03-11 DIAGNOSIS — R54 AGE-RELATED PHYSICAL DEBILITY: ICD-10-CM

## 2021-03-11 DIAGNOSIS — R26.2 AMBULATORY DYSFUNCTION: Primary | ICD-10-CM

## 2021-03-11 NOTE — TELEPHONE ENCOUNTER
[facility] faxed. They are requesting a written script for PT/OT with Dx. Could you please put one in the system?

## 2021-03-15 DIAGNOSIS — Z76.0 MEDICATION REFILL: ICD-10-CM

## 2021-03-15 RX ORDER — LANOLIN ALCOHOL/MO/W.PET/CERES
CREAM (GRAM) TOPICAL
Qty: 30 TABLET | Refills: 11 | Status: SHIPPED | OUTPATIENT
Start: 2021-03-15

## 2021-03-17 NOTE — PROGRESS NOTES
3/18/2021    Home visit medically necessary in lieu of an office visit due to: [facility] resident, uses walker, difficult to get out. HPI:   Patient says she is doing fine. She continues to have joint pains in hands, feet and legs. She is using BioFreeze and taking Tylenol with pretty good relief. She has had no falls in this month. She uses her rollator when ambulating. She has had no cough, fever/chills, SOB/CP. She moves her bowels regularly. She c/o urinary incontinence. She tries to elevate her legs as much as possible to prevent leg swelling; not using compression hose. REVIEW OF SYSTEMS:    GENERAL: Appetite good. No weight change, generally healthy, no change in strength or exercise tolerance. CARDIOVASCULAR: No chest pains, no murmurs, no palpitations, no syncope, no orthopnea. LEGS HAVE BEEN SWELLING. RESPIRATORY: No shortness of breath, no pain with breathing, no wheezing, no hemoptysis, no cough. BREASTS:  No lumps, discharge or pain. GASTROINTESTINAL: No change in appetite, no dysphagia, no heartburn, no abdominal pains, no diarrhea, no bowel habit changes, no emesis, no melena, no hemorrhoids. CONSTIPATION SOMETIMES. GENITOURINARY: No incontinence or retention, no urinary urgency, no frequent UTIs, no dysuria, no change in nature of urine. STRESS INCONTINENCE AT TIMES. MUSCULOSKELETAL: No swelling or redness of joints, no limitation of range of motion, no weakness or numbness. HAS HAD JT PAINS BUT NOT NOW. NEURO/PSYCH: No weakness, no tremor, no seizures, no changes in mentation, no ataxia. No depressive symptoms, no changes in sleep habits, no changes in thought content. MEMORY PROBLEMS. All other systems negative.     Allergies   Allergen Reactions    Flomax [Tamsulosin Hcl] Other (See Comments)     Causes chest pain    Ace Inhibitors Swelling     Causes lip swelling    Alendronate Sodium Other (See Comments)     Pt unsure of allergies    Lasix [Furosemide] Other (See Comments) weakness    Lisinopril Rash    Penicillins Rash     Rash    Propolis Other (See Comments)    Vioxx [Rofecoxib] Other (See Comments)     Past Medical History:   Diagnosis Date    Asthma     Atrioventricular (AV) dissociation     AV block, 1st degree 2/11/2015    Diabetes mellitus ([facility] [address]     GERD (gastroesophageal reflux disease)     Herniated disc     Cervical    Hyperlipidemia     Hypertension     Mild aortic regurgitation 11/5/13    Mitral regurgitation 11/5/13    mild-moderate    Nontoxic uninodular goiter     OA (osteoarthritis of spine)     cervical spine    Obesity      Past Surgical History:   Procedure Laterality Date    A-V CARDIAC PACEMAKER INSERTION Left 06/13/2016    Dual chamber pacemaker [address] by Dr Lencho Betancourt History     Socioeconomic History    Marital status:      Spouse name: Not on file    Number of children: None    Years of education: Not on file    Highest education level: Not on file   Occupational History    Not on file   Tobacco Use    Smoking status: Never Smoker    Smokeless tobacco: Never Used   Substance and Sexual Activity    Alcohol use: No     Frequency: Never     Binge frequency: Never     Comment:      Drug use: No    Sexual activity: Not Currently   Social History Narrative    1 cup tea daily; occ pop; no coffee      No family history on file. PHYSICAL EXAM:   Vitals:    03/18/21 1030   BP: 127/72   Pulse: 76   Resp: 18   Temp: 97.6 °F (36.4 °C)   SpO2: 97%   Weight: 195 lb 9.6 oz (88.7 kg)   Height: 5' 4\" (1.626 m)     Estimated body mass index is 33.57 kg/m² as calculated from the following:    Height as of this encounter: 5' 4\" (1.626 m). Weight as of this encounter: 195 lb 9.6 oz (88.7 kg). GEN:  elderly WDW[facility] female patient seated in recliner in NAD. HEAD:  atraumatic, normocephalic.    EYES:  EOMI, PERRL, no cataracts, conjunctivae appear normal.  ENT:   Good hearing, EACs without wax, TM's normal, nasal septum midline, no significant congestion, oral cavity without lesions, poor-fair dentition, no dentures. NECK:  fair-good ROM, no palpable masses, no carotid bruits, no JVD. LUNGS:  clear to ausc, no rales, rhonchi or wheezes. HEART:  RRR, no murmurs or gallops. ABD:  soft, non-tender to palp, no palp masses or HSM, normal BS. BACK:  no scoliosis or kyphosis, non-tender to palp. EXTREMITIES: Trace bilateral LE edema with support stockings on, no ulcerations, varicosities or erythema. No gross deformities. MUSCULOSKELETAL: Good ROM of all joints, non tender to palp and or with movement. SKIN:  No ulcerations or breakdown, rash, ecchymosis, or other lesions. NEURO:  No tremor, motor UEs 5/5 LEs  5/5, sensory normal, able to stand and walk slowly using rollator with mild ataxia. PSYCH:  Pleasant and cooperative. Fluid speech, oriented to person, place and time. No delusional statements. Medications reviewed. Labs 2/22/21 Na 130, GFR 52   11/20/20 GFR 52, Na 128, lytes o/w nl  8/21/20 GFR 52, Na 127, Glu 81, Alb 3;4   7/15/20 A1c 6.1 4/15/20 HH 14/43, Na 126, GFR 64, , HDL 58  6/11/20 COVID neg. ASSESSMENT:  Elderly female has HTN (hypertension), benign; Diabetes mellitus type 2, diet-controlled ([facility] [address] DDD (degenerative disc disease), cervical; Non-rheumatic mitral regurgitation; AI (aortic insufficiency); Pacemaker; Hyperlipidemia LDL goal <100; Acquired hypothyroidism; Syncope and collapse; Hyponatremia; Primary osteoarthritis involving multiple joints; Mild intermittent asthma without complication; History of complete heart block; Depression; Ambulatory dysfunction; Chronic fatigue; and Overactive bladder on their problem list.     Donnie was seen today for hypertension and joint pain.     Diagnoses and all orders for this visit:    HTN (hypertension), benign    Primary osteoarthritis involving multiple joints    Overactive bladder    Hyponatremia PLAN:  Continue PT. Continue Kegel exercises. Continue to use BioFreeze on painful joints. Continue Tylenol as needed. Encouraged to drink plenty of fluids. Discussed diet and activity level. Check BMP every 3 months, next in May. Continue other meds as per Rx List. Recheck 1 month. 40 minutes spent on visit, 25 minutes involved education/counseling regarding HTN, urinary, and DJD disease processes, treatment options, meds and coordination of care. Current Outpatient Medications   Medication Sig Dispense Refill    folic acid (FOLVITE) 732 MCG tablet TAKE 1 TABLET BY MOUTH IN  THE MORNING. 30 tablet 11    metoprolol succinate (TOPROL XL) 50 MG extended release tablet TAKE 1 TABLET BY MOUTH DAILY 30 tablet 7    metoprolol succinate (TOPROL XL) 25 MG extended release tablet TAKE 1 TABLET BY MOUTH AT  BEDTIME. 90 tablet 3    ipratropium (ATROVENT) 0.06 % nasal spray 2 sprays by Each Nostril route 4 times daily as needed for Rhinitis 1 Bottle 5    acetaminophen (ACETAMINOPHEN EXTRA STRENGTH) 500 MG tablet TAKE 1 TABLET BY MOUTH FOUR TIMES DAILY AS NEEDED FOR PAIN. 120 tablet 11    aspirin (ASPIR-LOW) 81 MG EC tablet TAKE 1 TABLET BY MOUTH ONCE DAILY 30 tablet 11    loratadine (CLARITIN) 10 MG tablet TAKE 1 TABLET BY MOUTH DAILY. 30 tablet 11    zinc sulfate (ORAZINC) 220 (50 Zn) MG capsule Take 1 capsule by mouth daily 30 capsule 3    Ascorbic Acid (VITAMIN C) 1000 MG tablet Take 1 tablet by mouth daily 30 tablet 3    Incontinence Supply Disposable MISC 2 each by Does not apply route as needed (Incontinence) Use prn. Dispense 2 cases. 11 Refills. Size large 2 each 11    vitamin D (CHOLECALCIFEROL) 50 MCG (2000 UT) TABS tablet Take 1 tablet by mouth daily      fluticasone (FLONASE) 50 MCG/ACT nasal spray 2 sprays by Each Nostril route daily      Lidocaine HCl (L-E) gel Apply topically once      simvastatin (ZOCOR) 10 MG tablet TAKE 1 TABLET BY MOUTH AT  BEDTIME.  30 tablet 11    calcium carbonate-vitamin D 600-200 MG-UNIT TABS Take 1 tablet by mouth 2 times daily 60 tablet 11    levothyroxine (SYNTHROID) 100 MCG tablet Take 1 tablet by mouth Daily TAKE 1 TABLET BY MOUTH IN THE MORNING 30 MINUTES BEFORE BREAKFAST OR OTHER MEDICATIONS. 90 tablet 3    spironolactone (ALDACTONE) 25 MG tablet TAKE 1 TABLET BY MOUTH DAILY 30 tablet 11    Omega-3 Fatty Acids (FISH OIL) 1200 MG CAPS TAKE 1 CAPSULE BY MOUTH IN THE MORNING. 30 capsule 11    amLODIPine (NORVASC) 5 MG tablet TAKE 1 TABLET BY MOUTH AT  BEDTIME. 30 tablet 11     No current facility-administered medications for this visit. Return in about 1 month (around 4/18/2021). An electronic signature was used to authenticate this note.     --Leonila Quezada MD on 3/18/2021 at 10:46 AM

## 2021-03-18 ENCOUNTER — OFFICE VISIT (OUTPATIENT)
Dept: PRIMARY CARE CLINIC | Age: 86
End: 2021-03-18
Payer: COMMERCIAL

## 2021-03-18 VITALS
WEIGHT: 195.6 LBS | OXYGEN SATURATION: 97 % | BODY MASS INDEX: 33.39 KG/M2 | DIASTOLIC BLOOD PRESSURE: 72 MMHG | RESPIRATION RATE: 18 BRPM | HEART RATE: 76 BPM | HEIGHT: 64 IN | TEMPERATURE: 97.6 F | SYSTOLIC BLOOD PRESSURE: 127 MMHG

## 2021-03-18 DIAGNOSIS — N32.81 OVERACTIVE BLADDER: ICD-10-CM

## 2021-03-18 DIAGNOSIS — I10 HTN (HYPERTENSION), BENIGN: Primary | Chronic | ICD-10-CM

## 2021-03-18 DIAGNOSIS — M15.9 PRIMARY OSTEOARTHRITIS INVOLVING MULTIPLE JOINTS: ICD-10-CM

## 2021-03-18 DIAGNOSIS — E87.1 HYPONATREMIA: ICD-10-CM

## 2021-03-18 PROCEDURE — 1090F PRES/ABSN URINE INCON ASSESS: CPT | Performed by: FAMILY MEDICINE

## 2021-03-18 PROCEDURE — G8417 CALC BMI ABV UP PARAM F/U: HCPCS | Performed by: FAMILY MEDICINE

## 2021-03-18 PROCEDURE — G8484 FLU IMMUNIZE NO ADMIN: HCPCS | Performed by: FAMILY MEDICINE

## 2021-03-18 PROCEDURE — 1123F ACP DISCUSS/DSCN MKR DOCD: CPT | Performed by: FAMILY MEDICINE

## 2021-04-01 ENCOUNTER — TELEPHONE (OUTPATIENT)
Dept: PRIMARY CARE CLINIC | Age: 86
End: 2021-04-01

## 2021-04-01 NOTE — TELEPHONE ENCOUNTER
Received a fax from [facility] staff requesting to DC her Tylenol with Codeine d/t non use. Last time she needed it was Sept. Of 2020.

## 2021-04-15 ENCOUNTER — OFFICE VISIT (OUTPATIENT)
Dept: PRIMARY CARE CLINIC | Age: 86
End: 2021-04-15
Payer: COMMERCIAL

## 2021-04-15 VITALS
SYSTOLIC BLOOD PRESSURE: 126 MMHG | TEMPERATURE: 97.3 F | BODY MASS INDEX: 33.8 KG/M2 | WEIGHT: 198 LBS | HEART RATE: 75 BPM | RESPIRATION RATE: 20 BRPM | OXYGEN SATURATION: 97 % | DIASTOLIC BLOOD PRESSURE: 70 MMHG | HEIGHT: 64 IN

## 2021-04-15 DIAGNOSIS — Z95.0 PACEMAKER: Chronic | ICD-10-CM

## 2021-04-15 DIAGNOSIS — M15.9 PRIMARY OSTEOARTHRITIS INVOLVING MULTIPLE JOINTS: ICD-10-CM

## 2021-04-15 DIAGNOSIS — K59.00 CONSTIPATION, UNSPECIFIED CONSTIPATION TYPE: ICD-10-CM

## 2021-04-15 DIAGNOSIS — J45.20 MILD INTERMITTENT ASTHMA WITHOUT COMPLICATION: ICD-10-CM

## 2021-04-15 DIAGNOSIS — N32.81 OVERACTIVE BLADDER: ICD-10-CM

## 2021-04-15 DIAGNOSIS — R26.2 DIFFICULTY WALKING: ICD-10-CM

## 2021-04-15 DIAGNOSIS — E11.9 DIABETES MELLITUS TYPE 2, DIET-CONTROLLED (HCC): Chronic | ICD-10-CM

## 2021-04-15 DIAGNOSIS — I10 HTN (HYPERTENSION), BENIGN: Primary | Chronic | ICD-10-CM

## 2021-04-15 PROCEDURE — 1123F ACP DISCUSS/DSCN MKR DOCD: CPT | Performed by: FAMILY MEDICINE

## 2021-04-15 PROCEDURE — 1090F PRES/ABSN URINE INCON ASSESS: CPT | Performed by: FAMILY MEDICINE

## 2021-04-15 PROCEDURE — G8417 CALC BMI ABV UP PARAM F/U: HCPCS | Performed by: FAMILY MEDICINE

## 2021-04-15 RX ORDER — LORATADINE 10 MG/1
TABLET ORAL
Qty: 30 TABLET | Refills: 11 | Status: SHIPPED
Start: 2021-04-15 | End: 2021-04-16

## 2021-04-15 RX ORDER — POLYETHYLENE GLYCOL 3350 17 G/17G
17 POWDER, FOR SOLUTION ORAL DAILY PRN
Qty: 510 G | Refills: 5 | Status: SHIPPED | OUTPATIENT
Start: 2021-04-15

## 2021-04-15 NOTE — PROGRESS NOTES
4/15/2021    Home visit medically necessary in lieu of an office visit due to: [facility] resident, uses walker, difficult to get out. HPI:   Patient says she is doing good. She is receiving therapy and thinks it's helping. She says her joint pains in hands, feet and legs are better too. She is using BioFreeze and taking Tylenol with pretty good relief. She has had no falls in this month. She uses her rollator when ambulating. She has had no cough, fever/chills, SOB/CP. She moves her bowels regularly. She c/o urinary incontinence. She tries to elevate her legs as much as possible to prevent leg swelling; not using compression hose. She has had problems recently with constipation. REVIEW OF SYSTEMS:    GENERAL: Appetite good. No weight change, generally healthy, no change in strength or exercise tolerance. CARDIOVASCULAR: No chest pains, no murmurs, no palpitations, no syncope, no orthopnea. LEGS HAVE BEEN SWELLING. RESPIRATORY: No shortness of breath, no pain with breathing, no wheezing, no hemoptysis, no cough. BREASTS:  No lumps, discharge or pain. GASTROINTESTINAL: No change in appetite, no dysphagia, no heartburn, no abdominal pains, no diarrhea, no bowel habit changes, no emesis, no melena, no hemorrhoids. CONSTIPATION SOMETIMES. GENITOURINARY: No incontinence or retention, no urinary urgency, no frequent UTIs, no dysuria, no change in nature of urine. STRESS INCONTINENCE AT TIMES. MUSCULOSKELETAL: No swelling or redness of joints, no limitation of range of motion, no weakness or numbness. HAS HAD JT PAINS BUT NOT NOW. NEURO/PSYCH: No weakness, no tremor, no seizures, no changes in mentation, no ataxia. No depressive symptoms, no changes in sleep habits, no changes in thought content. MEMORY PROBLEMS. All other systems negative.     Allergies   Allergen Reactions    Flomax [Tamsulosin Hcl] Other (See Comments)     Causes chest pain    Ace Inhibitors Swelling     Causes lip swelling    Alendronate Sodium Other (See Comments)     Pt unsure of allergies    Lasix [Furosemide] Other (See Comments)     weakness    Lisinopril Rash    Penicillins Rash     Rash    Propolis Other (See Comments)    Vioxx [Rofecoxib] Other (See Comments)     Past Medical History:   Diagnosis Date    Asthma     Atrioventricular (AV) dissociation     AV block, 1st degree 2/11/2015    Diabetes mellitus ([address]     GERD (gastroesophageal reflux disease)     Herniated disc     Cervical    Hyperlipidemia     Hypertension     Mild aortic regurgitation 11/5/13    Mitral regurgitation 11/5/13    mild-moderate    Nontoxic uninodular goiter     OA (osteoarthritis of spine)     cervical spine    Obesity      Past Surgical History:   Procedure Laterality Date    A-V CARDIAC PACEMAKER INSERTION Left 06/13/2016    Dual chamber pacemaker [address] by Dr Daniela Montoya History     Socioeconomic History    Marital status:      Spouse name: Not on file    Number of children: None    Years of education: Not on file    Highest education level: Not on file   Occupational History    Not on file   Tobacco Use    Smoking status: Never Smoker    Smokeless tobacco: Never Used   Substance and Sexual Activity    Alcohol use: No     Frequency: Never     Binge frequency: Never     Comment:      Drug use: No    Sexual activity: Not Currently   Social History Narrative    1 cup tea daily; occ pop; no coffee      No family history on file. PHYSICAL EXAM:   Vitals:    04/15/21 0853   BP: 126/70   Site: Right Upper Arm   Position: Sitting   Pulse: 75   Resp: 20   Temp: 97.3 °F (36.3 °C)   TempSrc: Temporal   SpO2: 97%   Weight: 198 lb (89.8 kg)   Height: 5' 4\" (1.626 m)     Estimated body mass index is 33.99 kg/m² as calculated from the following:    Height as of this encounter: 5' 4\" (1.626 m). Weight as of this encounter: 198 lb (89.8 kg).      GEN:  elderly [facility] female patient seated in recliner in NAD. HEAD:  atraumatic, normocephalic. EYES:  EOMI, PERRL, no cataracts, conjunctivae appear normal.  ENT:   Good hearing, EACs without wax, TM's normal, nasal septum midline, no significant congestion, oral cavity without lesions, poor-fair dentition, no dentures. NECK:  fair-good ROM, no palpable masses, no carotid bruits, no JVD. LUNGS:  clear to ausc, no rales, rhonchi or wheezes. HEART:  RRR, no murmurs or gallops. ABD:  soft, non-tender to palp, no palp masses or HSM, normal BS. BACK:  no scoliosis or kyphosis, non-tender to palp. EXTREMITIES: Trace bilateral LE edema with support stockings on, no ulcerations, varicosities or erythema. No gross deformities. MUSCULOSKELETAL: Good ROM of all joints, non tender to palp and or with movement. SKIN:  No ulcerations or breakdown, rash, ecchymosis, or other lesions. NEURO:  No tremor, motor UEs 5/5 LEs  5/5, sensory normal, able to stand and walk slowly using rollator with mild ataxia. PSYCH:  Pleasant and cooperative. Fluid speech, oriented to person, place and time. No delusional statements. Medications reviewed. Labs 2/22/21 Na 130, GFR 52   11/20/20 GFR 52, Na 128, lytes o/w nl  8/21/20 GFR 52, Na 127, Glu 81, Alb 3;4   7/15/20 A1c 6.1 4/15/20 HH 14/43, Na 126, GFR 64, , HDL 58  6/11/20 COVID neg. ASSESSMENT:  Elderly female has HTN (hypertension), benign; Diabetes mellitus type 2, diet-controlled ([address] DDD (degenerative disc disease), cervical; Non-rheumatic mitral regurgitation; AI (aortic insufficiency); Pacemaker; Hyperlipidemia LDL goal <100; Acquired hypothyroidism; Syncope and collapse; Hyponatremia; Primary osteoarthritis involving multiple joints; Mild intermittent asthma without complication; History of complete heart block; Depression; Difficulty walking; Chronic fatigue; Overactive bladder; and Constipation on their problem list.     Hettie was seen today for hypertension.     Diagnoses and all orders for this visit:    HTN (hypertension), benign    Primary osteoarthritis involving multiple joints    Diabetes mellitus type 2, diet-controlled ([facility] [address]    Difficulty walking    Pacemaker    Mild intermittent asthma without complication  -     loratadine (CLARITIN) 10 MG tablet; TAKE 1 TABLET BY MOUTH DAILY AS NEEDED FOR ALLERGIES. Overactive bladder    Constipation, unspecified constipation type    Other orders  -     polyethylene glycol (GLYCOLAX) 17 GM/SCOOP powder; Take 17 g by mouth daily as needed (constipation)       PLAN:  Miralax prn. D/C Vit C and Zinc. Change Claritin to prn. Continue Kegel exercises. Continue to use BioFreeze on painful joints. Continue Tylenol as needed. Encouraged to drink plenty of fluids. Discussed diet and activity level. Check BMP & A1c every 3 months, next in May. Continue other meds as per Rx List. Recheck 1 month. 40 minutes spent on visit, 25 minutes involved education/counseling regarding HTN, urinary, and DJD disease processes, treatment options, meds and coordination of care. Current Outpatient Medications   Medication Sig Dispense Refill    loratadine (CLARITIN) 10 MG tablet TAKE 1 TABLET BY MOUTH DAILY AS NEEDED FOR ALLERGIES. 30 tablet 11    polyethylene glycol (GLYCOLAX) 17 GM/SCOOP powder Take 17 g by mouth daily as needed (constipation) 855 g 5    folic acid (FOLVITE) 199 MCG tablet TAKE 1 TABLET BY MOUTH IN  THE MORNING. 30 tablet 11    metoprolol succinate (TOPROL XL) 50 MG extended release tablet TAKE 1 TABLET BY MOUTH DAILY 30 tablet 7    metoprolol succinate (TOPROL XL) 25 MG extended release tablet TAKE 1 TABLET BY MOUTH AT  BEDTIME.  90 tablet 3    ipratropium (ATROVENT) 0.06 % nasal spray 2 sprays by Each Nostril route 4 times daily as needed for Rhinitis 1 Bottle 5    acetaminophen (ACETAMINOPHEN EXTRA STRENGTH) 500 MG tablet TAKE 1 TABLET BY MOUTH FOUR TIMES DAILY AS NEEDED FOR PAIN. 120 tablet 11    aspirin (ASPIR-LOW) 81 MG EC tablet TAKE 1 TABLET BY MOUTH ONCE DAILY 30 tablet 11    Incontinence Supply Disposable MISC 2 each by Does not apply route as needed (Incontinence) Use prn. Dispense 2 cases. 11 Refills. Size large 2 each 11    vitamin D (CHOLECALCIFEROL) 50 MCG (2000 UT) TABS tablet Take 1 tablet by mouth daily      fluticasone (FLONASE) 50 MCG/ACT nasal spray 2 sprays by Each Nostril route daily      Lidocaine HCl (L-E) gel Apply topically once      simvastatin (ZOCOR) 10 MG tablet TAKE 1 TABLET BY MOUTH AT  BEDTIME. 30 tablet 11    calcium carbonate-vitamin D 600-200 MG-UNIT TABS Take 1 tablet by mouth 2 times daily 60 tablet 11    levothyroxine (SYNTHROID) 100 MCG tablet Take 1 tablet by mouth Daily TAKE 1 TABLET BY MOUTH IN THE MORNING 30 MINUTES BEFORE BREAKFAST OR OTHER MEDICATIONS. 90 tablet 3    spironolactone (ALDACTONE) 25 MG tablet TAKE 1 TABLET BY MOUTH DAILY 30 tablet 11    Omega-3 Fatty Acids (FISH OIL) 1200 MG CAPS TAKE 1 CAPSULE BY MOUTH IN THE MORNING. 30 capsule 11    amLODIPine (NORVASC) 5 MG tablet TAKE 1 TABLET BY MOUTH AT  BEDTIME. 30 tablet 11     No current facility-administered medications for this visit. Return in about 1 month (around 5/15/2021). An electronic signature was used to authenticate this note.     --Montse Jackson MD on 4/15/2021 at 11:34 AM

## 2021-05-19 ENCOUNTER — OFFICE VISIT (OUTPATIENT)
Dept: PRIMARY CARE CLINIC | Age: 86
End: 2021-05-19
Payer: COMMERCIAL

## 2021-05-19 VITALS
TEMPERATURE: 97.1 F | HEIGHT: 64 IN | BODY MASS INDEX: 34.42 KG/M2 | SYSTOLIC BLOOD PRESSURE: 139 MMHG | WEIGHT: 201.6 LBS | RESPIRATION RATE: 24 BRPM | HEART RATE: 79 BPM | OXYGEN SATURATION: 95 % | DIASTOLIC BLOOD PRESSURE: 61 MMHG

## 2021-05-19 DIAGNOSIS — M15.9 PRIMARY OSTEOARTHRITIS INVOLVING MULTIPLE JOINTS: ICD-10-CM

## 2021-05-19 DIAGNOSIS — Z95.0 PACEMAKER: Chronic | ICD-10-CM

## 2021-05-19 DIAGNOSIS — E11.9 DIABETES MELLITUS TYPE 2, DIET-CONTROLLED (HCC): Chronic | ICD-10-CM

## 2021-05-19 DIAGNOSIS — K59.00 CONSTIPATION, UNSPECIFIED CONSTIPATION TYPE: ICD-10-CM

## 2021-05-19 DIAGNOSIS — E78.5 HYPERLIPIDEMIA LDL GOAL <100: Chronic | ICD-10-CM

## 2021-05-19 DIAGNOSIS — I10 HTN (HYPERTENSION), BENIGN: Primary | Chronic | ICD-10-CM

## 2021-05-19 DIAGNOSIS — J45.20 MILD INTERMITTENT ASTHMA WITHOUT COMPLICATION: ICD-10-CM

## 2021-05-19 DIAGNOSIS — R26.2 DIFFICULTY WALKING: ICD-10-CM

## 2021-05-19 DIAGNOSIS — N32.81 OVERACTIVE BLADDER: ICD-10-CM

## 2021-05-19 PROCEDURE — 1123F ACP DISCUSS/DSCN MKR DOCD: CPT | Performed by: PHYSICIAN ASSISTANT

## 2021-05-19 PROCEDURE — 1090F PRES/ABSN URINE INCON ASSESS: CPT | Performed by: PHYSICIAN ASSISTANT

## 2021-05-19 PROCEDURE — G8417 CALC BMI ABV UP PARAM F/U: HCPCS | Performed by: PHYSICIAN ASSISTANT

## 2021-05-19 NOTE — PROGRESS NOTES
5/19/2021    Home visit medically necessary in lieu of an office visit due to: [facility] resident, uses walker, difficult to get out. HPI:  Patient thinks the therapy helped her joints. Still having some shoulder pain. She is using BioFreeze and taking Tylenol with pretty good relief. She has had no falls in this month. She uses her rollator when ambulating. Has occasional runny nose. She has had no cough, fever/chills, SOB/CP. She moves her bowels regularly with last BM today. She c/o urinary incontinence. She tries to elevate her legs as much as possible to prevent leg swelling; not using compression hose. REVIEW OF SYSTEMS:    GENERAL: Appetite good. No weight change, generally healthy, no change in strength or exercise tolerance. CARDIOVASCULAR: No chest pains, no murmurs, no palpitations, no syncope, no orthopnea. LEGS HAVE BEEN SWELLING. RESPIRATORY: No shortness of breath, no pain with breathing, no wheezing, no hemoptysis, no cough. BREASTS:  No lumps, discharge or pain. GASTROINTESTINAL: No change in appetite, no dysphagia, no heartburn, no abdominal pains, no diarrhea, no bowel habit changes, no emesis, no melena, no hemorrhoids. CONSTIPATION SOMETIMES. GENITOURINARY: No incontinence or retention, no urinary urgency, no frequent UTIs, no dysuria, no change in nature of urine. STRESS INCONTINENCE AT TIMES. MUSCULOSKELETAL: No swelling or redness of joints, no limitation of range of motion, no weakness or numbness. HAS HAD JT PAINS BUT NOT NOW. NEURO/PSYCH: No weakness, no tremor, no seizures, no changes in mentation, no ataxia. No depressive symptoms, no changes in sleep habits, no changes in thought content. MEMORY PROBLEMS. All other systems negative.     Allergies   Allergen Reactions    Flomax [Tamsulosin Hcl] Other (See Comments)     Causes chest pain    Ace Inhibitors Swelling     Causes lip swelling    Alendronate Sodium Other (See Comments)     Pt unsure of allergies    Lasix patient seated in recliner in NAD. HEAD:  atraumatic, normocephalic. EYES:  EOMI, PERRL, no cataracts, conjunctivae appear normal.  ENT:   Good hearing, EACs without wax, TM's normal, nasal septum midline, no significant congestion, oral cavity without lesions, poor-fair dentition, no dentures. NECK:  fair-good ROM, no palpable masses, no carotid bruits, no JVD. LUNGS:  clear to ausc, no rales, rhonchi or wheezes. HEART:  RRR, no murmurs or gallops. ABD:  soft, non-tender to palp, no palp masses or HSM, normal BS. BACK:  no scoliosis or kyphosis, non-tender to palp. EXTREMITIES: Trace bilateral LE edema with support stockings on, no ulcerations, varicosities or erythema. No gross deformities. MUSCULOSKELETAL: Good ROM of all joints, non tender to palp and or with movement. SKIN:  No ulcerations or breakdown, rash, ecchymosis, or other lesions. NEURO:  No tremor, motor UEs 5/5 LEs  5/5, sensory normal, able to stand and walk slowly using rollator with mild ataxia. PSYCH:  Pleasant and cooperative. Fluid speech, oriented to person, place and time. No delusional statements. Medications reviewed. Labs 2/22/21 Na 130, GFR 52   11/20/20 GFR 52, Na 128, lytes o/w nl  8/21/20 GFR 52, Na 127, Glu 81, Alb 3;4   7/15/20 A1c 6.1 4/15/20 HH 14/43, Na 126, GFR 64, , HDL 58  6/11/20 COVID neg. ASSESSMENT:  Elderly female has HTN (hypertension), benign; Diabetes mellitus type 2, diet-controlled ([address] DDD (degenerative disc disease), cervical; Non-rheumatic mitral regurgitation; AI (aortic insufficiency); Pacemaker; Hyperlipidemia LDL goal <100; Acquired hypothyroidism; Syncope and collapse; Hyponatremia; Primary osteoarthritis involving multiple joints; Mild intermittent asthma without complication; History of complete heart block; Depression; Difficulty walking; Chronic fatigue; Overactive bladder; and Constipation on their problem list.     Hettie was seen today for hypertension.     Diagnoses and all orders for this visit:    HTN (hypertension), benign  -     CBC; Future  -     COMPREHENSIVE METABOLIC PANEL; Future  -     TSH; Future    Diabetes mellitus type 2, diet-controlled ([facility] [address]  -     CBC; Future  -     COMPREHENSIVE METABOLIC PANEL; Future  -     HEMOGLOBIN A1C; Future    Primary osteoarthritis involving multiple joints    Mild intermittent asthma without complication    Pacemaker    Difficulty walking    Constipation, unspecified constipation type    Overactive bladder    Hyperlipidemia LDL goal <100  -     LIPID PANEL; Future         PLAN:  Check CBC, CMP, A1c, TSH, Lipid panel. Miralax prn. May use Claritin and Flonase for rhinitis. Continue Kegel exercises. Continue to use BioFreeze on painful joints. Continue Tylenol for pain as needed. Encouraged to drink plenty of fluids. Discussed diet and activity level. Check BMP & A1c every 3 months, next in August. Continue other meds as per Rx List. Recheck 1 month. 40 minutes spent on visit, 25 minutes involved education/counseling regarding HTN, urinary, and DJD disease processes, treatment options, meds and coordination of care. Current Outpatient Medications   Medication Sig Dispense Refill    loratadine (CLARITIN) 10 MG tablet TAKE 1 TABLET BY MOUTH ONCE A DAY AS NEEDED 90 tablet 3    polyethylene glycol (GLYCOLAX) 17 GM/SCOOP powder Take 17 g by mouth daily as needed (constipation) 326 g 5    folic acid (FOLVITE) 217 MCG tablet TAKE 1 TABLET BY MOUTH IN  THE MORNING. 30 tablet 11    metoprolol succinate (TOPROL XL) 50 MG extended release tablet TAKE 1 TABLET BY MOUTH DAILY 30 tablet 7    metoprolol succinate (TOPROL XL) 25 MG extended release tablet TAKE 1 TABLET BY MOUTH AT  BEDTIME.  90 tablet 3    ipratropium (ATROVENT) 0.06 % nasal spray 2 sprays by Each Nostril route 4 times daily as needed for Rhinitis 1 Bottle 5    acetaminophen (ACETAMINOPHEN EXTRA STRENGTH) 500 MG tablet TAKE 1 TABLET BY MOUTH FOUR TIMES DAILY AS NEEDED FOR PAIN. 120 tablet 11    aspirin (ASPIR-LOW) 81 MG EC tablet TAKE 1 TABLET BY MOUTH ONCE DAILY 30 tablet 11    Incontinence Supply Disposable MISC 2 each by Does not apply route as needed (Incontinence) Use prn. Dispense 2 cases. 11 Refills. Size large 2 each 11    vitamin D (CHOLECALCIFEROL) 50 MCG (2000 UT) TABS tablet Take 1 tablet by mouth daily      fluticasone (FLONASE) 50 MCG/ACT nasal spray 2 sprays by Each Nostril route daily      Lidocaine HCl (L-E) gel Apply topically once      simvastatin (ZOCOR) 10 MG tablet TAKE 1 TABLET BY MOUTH AT  BEDTIME. 30 tablet 11    calcium carbonate-vitamin D 600-200 MG-UNIT TABS Take 1 tablet by mouth 2 times daily 60 tablet 11    levothyroxine (SYNTHROID) 100 MCG tablet Take 1 tablet by mouth Daily TAKE 1 TABLET BY MOUTH IN THE MORNING 30 MINUTES BEFORE BREAKFAST OR OTHER MEDICATIONS. 90 tablet 3    spironolactone (ALDACTONE) 25 MG tablet TAKE 1 TABLET BY MOUTH DAILY 30 tablet 11    Omega-3 Fatty Acids (FISH OIL) 1200 MG CAPS TAKE 1 CAPSULE BY MOUTH IN THE MORNING. 30 capsule 11    amLODIPine (NORVASC) 5 MG tablet TAKE 1 TABLET BY MOUTH AT  BEDTIME. 30 tablet 11     No current facility-administered medications for this visit. Return in about 1 month (around 6/19/2021) for regular visit. An electronic signature was used to authenticate this note.     --Yamileth Colbert PA-C on 5/19/2021 at 1:24 PM

## 2021-05-20 LAB
ALBUMIN SERPL-MCNC: 3.5 G/DL
ALP BLD-CCNC: 80 U/L
ALT SERPL-CCNC: 7 U/L
ANION GAP SERPL CALCULATED.3IONS-SCNC: ABNORMAL MMOL/L
AST SERPL-CCNC: 14 U/L
AVERAGE GLUCOSE: 128
BASOPHILS ABSOLUTE: 0.1 /ΜL
BASOPHILS RELATIVE PERCENT: 0.7 %
BILIRUB SERPL-MCNC: 0.4 MG/DL (ref 0.1–1.4)
BUN BLDV-MCNC: 32 MG/DL
CALCIUM SERPL-MCNC: 9.5 MG/DL
CHLORIDE BLD-SCNC: 95 MMOL/L
CHOLESTEROL, TOTAL: 119 MG/DL
CHOLESTEROL/HDL RATIO: ABNORMAL
CO2: 23 MMOL/L
CREAT SERPL-MCNC: 1.1 MG/DL
EOSINOPHILS ABSOLUTE: 0.5 /ΜL
EOSINOPHILS RELATIVE PERCENT: 7.1 %
GFR CALCULATED: 47
GLUCOSE BLD-MCNC: 79 MG/DL
HBA1C MFR BLD: 6.1 %
HCT VFR BLD CALC: 36.4 % (ref 36–46)
HDLC SERPL-MCNC: 47 MG/DL (ref 35–70)
HEMOGLOBIN: 11.9 G/DL (ref 12–16)
LDL CHOLESTEROL CALCULATED: 57 MG/DL (ref 0–160)
LYMPHOCYTES ABSOLUTE: 2.8 /ΜL
LYMPHOCYTES RELATIVE PERCENT: 38.3 %
MCH RBC QN AUTO: 29.6 PG
MCHC RBC AUTO-ENTMCNC: 32.8 G/DL
MCV RBC AUTO: 90.2 FL
MONOCYTES ABSOLUTE: 1.1 /ΜL
MONOCYTES RELATIVE PERCENT: 15.3 %
NEUTROPHILS ABSOLUTE: 2.8 /ΜL
NEUTROPHILS RELATIVE PERCENT: 38.6 %
NONHDLC SERPL-MCNC: ABNORMAL MG/DL
PLATELET # BLD: 306 K/ΜL
PMV BLD AUTO: 7.9 FL
POTASSIUM SERPL-SCNC: 4.7 MMOL/L
RBC # BLD: 4.03 10^6/ΜL
SODIUM BLD-SCNC: 128 MMOL/L
TOTAL PROTEIN: 7.1
TRIGL SERPL-MCNC: 76 MG/DL
TSH SERPL DL<=0.05 MIU/L-ACNC: 1.15 UIU/ML
VLDLC SERPL CALC-MCNC: 15 MG/DL
WBC # BLD: 7.2 10^3/ML

## 2021-05-21 DIAGNOSIS — E78.5 HYPERLIPIDEMIA LDL GOAL <100: Chronic | ICD-10-CM

## 2021-05-21 DIAGNOSIS — I10 HTN (HYPERTENSION), BENIGN: Chronic | ICD-10-CM

## 2021-05-21 DIAGNOSIS — E11.9 DIABETES MELLITUS TYPE 2, DIET-CONTROLLED (HCC): Chronic | ICD-10-CM

## 2021-06-16 ENCOUNTER — OFFICE VISIT (OUTPATIENT)
Dept: PRIMARY CARE CLINIC | Age: 86
End: 2021-06-16
Payer: COMMERCIAL

## 2021-06-16 VITALS
TEMPERATURE: 96.9 F | OXYGEN SATURATION: 93 % | DIASTOLIC BLOOD PRESSURE: 73 MMHG | WEIGHT: 200.6 LBS | HEIGHT: 64 IN | BODY MASS INDEX: 34.25 KG/M2 | SYSTOLIC BLOOD PRESSURE: 139 MMHG | RESPIRATION RATE: 18 BRPM | HEART RATE: 77 BPM

## 2021-06-16 DIAGNOSIS — J45.20 MILD INTERMITTENT ASTHMA WITHOUT COMPLICATION: ICD-10-CM

## 2021-06-16 DIAGNOSIS — R26.2 DIFFICULTY WALKING: ICD-10-CM

## 2021-06-16 DIAGNOSIS — N32.81 OVERACTIVE BLADDER: ICD-10-CM

## 2021-06-16 DIAGNOSIS — M15.9 PRIMARY OSTEOARTHRITIS INVOLVING MULTIPLE JOINTS: ICD-10-CM

## 2021-06-16 DIAGNOSIS — E11.9 DIABETES MELLITUS TYPE 2, DIET-CONTROLLED (HCC): Chronic | ICD-10-CM

## 2021-06-16 DIAGNOSIS — I10 HTN (HYPERTENSION), BENIGN: Primary | Chronic | ICD-10-CM

## 2021-06-16 PROCEDURE — 1090F PRES/ABSN URINE INCON ASSESS: CPT | Performed by: PHYSICIAN ASSISTANT

## 2021-06-16 PROCEDURE — 1123F ACP DISCUSS/DSCN MKR DOCD: CPT | Performed by: PHYSICIAN ASSISTANT

## 2021-06-16 PROCEDURE — G8417 CALC BMI ABV UP PARAM F/U: HCPCS | Performed by: PHYSICIAN ASSISTANT

## 2021-06-16 NOTE — PROGRESS NOTES
6/16/2021    Home visit medically necessary in lieu of an office visit due to: [facility] resident, uses walker, difficult to get out. HPI:  Resident states she is doing ok. Joint pain is controlled using BioFreeze and taking Tylenol. No falls in this month. She uses her rollator when ambulating. Has occasional runny nose. She has had no cough, fever/chills, SOB/CP. She moves her bowels regularly. She c/o chronic urinary incontinence. She tries to elevate her legs as much as possible to prevent leg swelling; not using compression hose. REVIEW OF SYSTEMS:    GENERAL: Appetite good. No weight change, generally healthy, no change in strength or exercise tolerance. CARDIOVASCULAR: No chest pains, no murmurs, no palpitations, no syncope, no orthopnea. LEGS HAVE BEEN SWELLING. RESPIRATORY: No shortness of breath, no pain with breathing, no wheezing, no hemoptysis, no cough. BREASTS:  No lumps, discharge or pain. GASTROINTESTINAL: No change in appetite, no dysphagia, no heartburn, no abdominal pains, no diarrhea, no bowel habit changes, no emesis, no melena, no hemorrhoids. CONSTIPATION SOMETIMES. GENITOURINARY: No incontinence or retention, no urinary urgency, no frequent UTIs, no dysuria, no change in nature of urine. STRESS INCONTINENCE AT TIMES. MUSCULOSKELETAL: No swelling or redness of joints, no limitation of range of motion, no weakness or numbness. HAS HAD JT PAINS BUT NOT NOW. NEURO/PSYCH: No weakness, no tremor, no seizures, no changes in mentation, no ataxia. No depressive symptoms, no changes in sleep habits, no changes in thought content. MEMORY PROBLEMS. All other systems negative.     Allergies   Allergen Reactions    Flomax [Tamsulosin Hcl] Other (See Comments)     Causes chest pain    Ace Inhibitors Swelling     Causes lip swelling    Alendronate Sodium Other (See Comments)     Pt unsure of allergies    Lasix [Furosemide] Other (See Comments)     weakness    Lisinopril Rash    Penicillins Rash Rash    Propolis Other (See Comments)    Vioxx [Rofecoxib] Other (See Comments)     Past Medical History:   Diagnosis Date    Asthma     Atrioventricular (AV) dissociation     AV block, 1st degree 2/11/2015    Diabetes mellitus ([address]     GERD (gastroesophageal reflux disease)     Herniated disc     Cervical    Hyperlipidemia     Hypertension     Mild aortic regurgitation 11/5/13    Mitral regurgitation 11/5/13    mild-moderate    Nontoxic uninodular goiter     OA (osteoarthritis of spine)     cervical spine    Obesity      Past Surgical History:   Procedure Laterality Date    A-V CARDIAC PACEMAKER INSERTION Left 06/13/2016    Dual chamber pacemaker [address] by Dr Tarik Hernandez History     Socioeconomic History    Marital status:      Spouse name: Not on file    Number of children: None    Years of education: Not on file    Highest education level: Not on file   Occupational History    Not on file   Tobacco Use    Smoking status: Never Smoker    Smokeless tobacco: Never Used   Substance and Sexual Activity    Alcohol use: No     Frequency: Never     Binge frequency: Never     Comment:      Drug use: No    Sexual activity: Not Currently   Social History Narrative    1 cup tea daily; occ pop; no coffee      Vaccine Date   COVID-19 12/23/20, 1/13/21   Influenza 10/20/20   Prevnar 13 12/29/2015   Pneumovax 23 1/12/2017     No family history on file. PHYSICAL EXAM:   Vitals:    06/16/21 1109   BP: 139/73   Pulse: 77   Resp: 18   Temp: 96.9 °F (36.1 °C)   TempSrc: Temporal   SpO2: 93%   Weight: 200 lb 9.6 oz (91 kg)   Height: 5' 4\" (1.626 m)     Estimated body mass index is 34.43 kg/m² as calculated from the following:    Height as of this encounter: 5' 4\" (1.626 m). Weight as of this encounter: 200 lb 9.6 oz (91 kg). GEN:  elderly [facility] female patient seated in recliner in NAD. HEAD:  atraumatic, normocephalic.    EYES:  EOMI, PERRL, no cataracts, conjunctivae appear normal.  ENT:   Good hearing, EACs without wax, TM's normal, nasal septum midline, no significant congestion, oral cavity without lesions, poor-fair dentition, no dentures. NECK:  fair-good ROM, no palpable masses, no carotid bruits, no JVD. LUNGS:  clear to ausc, no rales, rhonchi or wheezes. HEART:  RRR, no murmurs or gallops. ABD:  soft, non-tender to palp, no palp masses or HSM, normal BS. BACK:  no scoliosis or kyphosis, non-tender to palp. EXTREMITIES: Trace bilateral LE edema with support stockings on, no ulcerations, varicosities or erythema. No gross deformities. MUSCULOSKELETAL: Good ROM of all joints, non tender to palp and or with movement. SKIN:  No ulcerations or breakdown, rash, ecchymosis, or other lesions. NEURO:  No tremor, motor UEs 5/5 LEs  5/5, sensory normal, able to stand and walk slowly using rollator with mild ataxia. PSYCH:  Pleasant and cooperative. Fluid speech, oriented to person, place and time. No delusional statements. Medications reviewed. Labs 5/20/21 HH 11.9/36.4, GFR 47, Na 128, Cl 95, LFT nl, A1c 6.1, TSH 1.151, , Tri 76, HDL 47, LDL 57   2/22/21 Na 130, GFR 52   11/20/20 GFR 52, Na 128, lytes o/w nl  8/21/20 GFR 52, Na 127, Glu 81, Alb 3;4   7/15/20 A1c 6.1 4/15/20 HH 14/43, Na 126, GFR 64, , HDL 58  6/11/20 COVID neg. ASSESSMENT:  Elderly female has HTN (hypertension), benign; Diabetes mellitus type 2, diet-controlled ([facility] [address] DDD (degenerative disc disease), cervical; Non-rheumatic mitral regurgitation; AI (aortic insufficiency); Pacemaker; Hyperlipidemia LDL goal <100; Acquired hypothyroidism; Syncope and collapse; Hyponatremia; Primary osteoarthritis involving multiple joints; Mild intermittent asthma without complication; History of complete heart block; Depression; Difficulty walking; Chronic fatigue;  Overactive bladder; and Constipation on their problem list.     Donnie was seen today for hypertension. Diagnoses and all orders for this visit:    HTN (hypertension), benign    Diabetes mellitus type 2, diet-controlled ([facility] [address]    Mild intermittent asthma without complication    Primary osteoarthritis involving multiple joints    Overactive bladder    Difficulty walking          PLAN:  Miralax prn constipation. May use Claritin and Flonase for rhinitis. Continue Kegel exercises. Continue to use BioFreeze on painful joints. Continue Tylenol for pain as needed. Encouraged to drink plenty of fluids. Discussed diet and activity level. Check BMP & A1c every 3 months, next in August. Continue other meds as per Rx List. Recheck 1 month. 40 minutes spent on visit, 25 minutes involved education/counseling regarding HTN, urinary, and DJD disease processes, treatment options, meds and coordination of care. Current Outpatient Medications   Medication Sig Dispense Refill    loratadine (CLARITIN) 10 MG tablet TAKE 1 TABLET BY MOUTH ONCE A DAY AS NEEDED 90 tablet 3    polyethylene glycol (GLYCOLAX) 17 GM/SCOOP powder Take 17 g by mouth daily as needed (constipation) 164 g 5    folic acid (FOLVITE) 174 MCG tablet TAKE 1 TABLET BY MOUTH IN  THE MORNING. 30 tablet 11    metoprolol succinate (TOPROL XL) 50 MG extended release tablet TAKE 1 TABLET BY MOUTH DAILY 30 tablet 7    metoprolol succinate (TOPROL XL) 25 MG extended release tablet TAKE 1 TABLET BY MOUTH AT  BEDTIME. 90 tablet 3    ipratropium (ATROVENT) 0.06 % nasal spray 2 sprays by Each Nostril route 4 times daily as needed for Rhinitis 1 Bottle 5    acetaminophen (ACETAMINOPHEN EXTRA STRENGTH) 500 MG tablet TAKE 1 TABLET BY MOUTH FOUR TIMES DAILY AS NEEDED FOR PAIN. 120 tablet 11    aspirin (ASPIR-LOW) 81 MG EC tablet TAKE 1 TABLET BY MOUTH ONCE DAILY 30 tablet 11    Incontinence Supply Disposable MISC 2 each by Does not apply route as needed (Incontinence) Use prn. Dispense 2 cases. 11 Refills.  Size large 2 each 11    vitamin D (CHOLECALCIFEROL) 50 MCG (2000 UT) TABS tablet Take 1 tablet by mouth daily      fluticasone (FLONASE) 50 MCG/ACT nasal spray 2 sprays by Each Nostril route daily      Lidocaine HCl (L-E) gel Apply topically once      simvastatin (ZOCOR) 10 MG tablet TAKE 1 TABLET BY MOUTH AT  BEDTIME. 30 tablet 11    calcium carbonate-vitamin D 600-200 MG-UNIT TABS Take 1 tablet by mouth 2 times daily 60 tablet 11    levothyroxine (SYNTHROID) 100 MCG tablet Take 1 tablet by mouth Daily TAKE 1 TABLET BY MOUTH IN THE MORNING 30 MINUTES BEFORE BREAKFAST OR OTHER MEDICATIONS. 90 tablet 3    spironolactone (ALDACTONE) 25 MG tablet TAKE 1 TABLET BY MOUTH DAILY 30 tablet 11    Omega-3 Fatty Acids (FISH OIL) 1200 MG CAPS TAKE 1 CAPSULE BY MOUTH IN THE MORNING. 30 capsule 11    amLODIPine (NORVASC) 5 MG tablet TAKE 1 TABLET BY MOUTH AT  BEDTIME. 30 tablet 11     No current facility-administered medications for this visit. Return in about 1 month (around 7/16/2021) for regular visit. An electronic signature was used to authenticate this note.     --Deion Mar PA-C on 6/16/2021 at 11:19 AM

## 2021-07-22 NOTE — PROGRESS NOTES
7/23/2021    Home visit medically necessary in lieu of an office visit due to: [facility] resident, uses walker, difficult to get out. HPI:  Patient c/o dry cough for months that has been bothering her. Her nose is also runny but she has not been using nose sprays. She has had no difficulty breathing. She has been walking halls with rollator and has no CASTANO. She has not been having much arthritis pain since she is using BioFreeze and taking Tylenol. She has had no falls in this month. She has had no fever/chills, CP. She moves her bowels regularly. She c/o chronic urinary incontinence. She tries to elevate her legs as much as possible to prevent leg swelling; not using compression hose. REVIEW OF SYSTEMS:    GENERAL: Appetite good. No weight change, generally healthy, no change in strength or exercise tolerance. CARDIOVASCULAR: No chest pains, no murmurs, no palpitations, no syncope, no orthopnea. LEGS HAVE BEEN SWELLING. RESPIRATORY: No shortness of breath, no pain with breathing, no wheezing, no hemoptysis, no cough. BREASTS:  No lumps, discharge or pain. GASTROINTESTINAL: No change in appetite, no dysphagia, no heartburn, no abdominal pains, no diarrhea, no bowel habit changes, no emesis, no melena, no hemorrhoids. CONSTIPATION SOMETIMES. GENITOURINARY: No incontinence or retention, no urinary urgency, no frequent UTIs, no dysuria, no change in nature of urine. STRESS INCONTINENCE AT TIMES. MUSCULOSKELETAL: No swelling or redness of joints, no limitation of range of motion, no weakness or numbness. HAS HAD JT PAINS BUT NOT NOW. NEURO/PSYCH: No weakness, no tremor, no seizures, no changes in mentation, no ataxia. No depressive symptoms, no changes in sleep habits, no changes in thought content. MEMORY PROBLEMS. All other systems negative.     Allergies   Allergen Reactions    Flomax [Tamsulosin Hcl] Other (See Comments)     Causes chest pain    Ace Inhibitors Swelling     Causes lip swelling    Alendronate Sodium Other (See Comments)     Pt unsure of allergies    Lasix [Furosemide] Other (See Comments)     weakness    Lisinopril Rash    Penicillins Rash     Rash    Propolis Other (See Comments)    Vioxx [Rofecoxib] Other (See Comments)     Past Medical History:   Diagnosis Date    Asthma     Atrioventricular (AV) dissociation     AV block, 1st degree 2/11/2015    Diabetes mellitus ([address]     GERD (gastroesophageal reflux disease)     Herniated disc     Cervical    Hyperlipidemia     Hypertension     Mild aortic regurgitation 11/5/13    Mitral regurgitation 11/5/13    mild-moderate    Nontoxic uninodular goiter     OA (osteoarthritis of spine)     cervical spine    Obesity      Past Surgical History:   Procedure Laterality Date    A-V CARDIAC PACEMAKER INSERTION Left 06/13/2016    Dual chamber pacemaker [address] by Dr Virginia Burris History     Socioeconomic History    Marital status:      Spouse name: Not on file    Number of children: None    Years of education: Not on file    Highest education level: Not on file   Occupational History    Not on file   Tobacco Use    Smoking status: Never Smoker    Smokeless tobacco: Never Used   Substance and Sexual Activity    Alcohol use: No     Frequency: Never     Binge frequency: Never     Comment:      Drug use: No    Sexual activity: Not Currently   Social History Narrative    1 cup tea daily; occ pop; no coffee      Vaccine Date   COVID-19 12/23/20, 1/13/21   Influenza 10/20/20   Prevnar 13 12/29/2015   Pneumovax 23 1/12/2017     No family history on file. PHYSICAL EXAM:   Vitals:    07/23/21 0904   BP: 133/61   Site: Left Upper Arm   Position: Sitting   Pulse: 66   Resp: 16   Temp: 98 °F (36.7 °C)   SpO2: 97%   Weight: 198 lb (89.8 kg)   Height: 5' 4\" (1.626 m)     Estimated body mass index is 33.99 kg/m² as calculated from the following:    Height as of this encounter: 5' 4\" (1.626 m). Weight as of this encounter: 198 lb (89.8 kg). GEN:  elderly [facility] female patient seated in recliner in NAD. HEAD:  atraumatic, normocephalic. EYES:  EOMI, PERRL, no cataracts, conjunctivae appear normal.  ENT:   Good hearing, EACs without wax, TM's normal, nasal septum midline, no significant congestion, oral cavity without lesions, poor-fair dentition, no dentures. NECK:  fair-good ROM, no palpable masses, no carotid bruits, no JVD. LUNGS:  clear to ausc, no rales, rhonchi or wheezes. HEART:  RRR, no murmurs or gallops. ABD:  soft, non-tender to palp, no palp masses or HSM, normal BS. BACK:  no scoliosis or kyphosis, non-tender to palp. EXTREMITIES: Trace-no edema, no ulcerations, varicosities or erythema. No gross deformities. MUSCULOSKELETAL: Good ROM of all joints, non tender to palp and or with movement. SKIN:  No ulcerations or breakdown, rash, ecchymosis, or other lesions. NEURO:  No tremor, motor UEs 5/5 LEs  5/5, sensory normal, able to stand and walk slowly using rollator with mild ataxia. PSYCH:  Pleasant and cooperative. Fluid speech, oriented to person, place and time. No delusional statements. Medications reviewed. Labs 5/20/21 HH 11.9/36.4, GFR 47, Na 128, Cl 95, LFT nl, A1c 6.1, TSH 1.151, , Tri 76, HDL 47, LDL 57   2/22/21 Na 130, GFR 52   11/20/20 GFR 52, Na 128, lytes o/w nl  8/21/20 GFR 52, Na 127, Glu 81, Alb 3;4       ASSESSMENT:  Elderly female has HTN (hypertension), benign; Diabetes mellitus type 2, diet-controlled ([address] DDD (degenerative disc disease), cervical; Non-rheumatic mitral regurgitation; AI (aortic insufficiency); Pacemaker; Hyperlipidemia LDL goal <100; Acquired hypothyroidism; Syncope and collapse; Hyponatremia; Primary osteoarthritis involving multiple joints; Mild intermittent asthma without complication; History of complete heart block; Depression; Difficulty walking; Chronic fatigue;  Overactive bladder; and Constipation on their problem list.     Donnie was seen today for hypertension and joint pain. Diagnoses and all orders for this visit:    HTN (hypertension), benign    Primary osteoarthritis involving multiple joints    DDD (degenerative disc disease), cervical    Overactive bladder    Difficulty walking    Cough    Mild intermittent asthma without complication  -     loratadine (CLARITIN) 10 MG tablet; TAKE 1 TABLET BY MOUTH ONCE A DAY        PLAN:    Instructed to start using Claritin and Flonase for rhinitis. Continue Kegel exercises. Continue to use BioFreeze on painful joints. Continue Tylenol for pain as needed. Encouraged to drink plenty of fluids. Discussed diet and activity level. Check BMP & A1c every 3 months, next in August. Continue other meds as per Rx List. Recheck 1 month. 40 minutes spent on visit, 25 minutes involved education/counseling regarding HTN, urinary, and DJD disease processes, treatment options, meds and coordination of care. Current Outpatient Medications   Medication Sig Dispense Refill    loratadine (CLARITIN) 10 MG tablet TAKE 1 TABLET BY MOUTH ONCE A DAY 90 tablet 3    polyethylene glycol (GLYCOLAX) 17 GM/SCOOP powder Take 17 g by mouth daily as needed (constipation) 970 g 5    folic acid (FOLVITE) 632 MCG tablet TAKE 1 TABLET BY MOUTH IN  THE MORNING. 30 tablet 11    metoprolol succinate (TOPROL XL) 50 MG extended release tablet TAKE 1 TABLET BY MOUTH DAILY 30 tablet 7    metoprolol succinate (TOPROL XL) 25 MG extended release tablet TAKE 1 TABLET BY MOUTH AT  BEDTIME.  90 tablet 3    ipratropium (ATROVENT) 0.06 % nasal spray 2 sprays by Each Nostril route 4 times daily as needed for Rhinitis 1 Bottle 5    acetaminophen (ACETAMINOPHEN EXTRA STRENGTH) 500 MG tablet TAKE 1 TABLET BY MOUTH FOUR TIMES DAILY AS NEEDED FOR PAIN. 120 tablet 11    aspirin (ASPIR-LOW) 81 MG EC tablet TAKE 1 TABLET BY MOUTH ONCE DAILY 30 tablet 11    Incontinence Supply Disposable MISC 2 each by Does not apply route as needed (Incontinence) Use prn. Dispense 2 cases. 11 Refills. Size large 2 each 11    vitamin D (CHOLECALCIFEROL) 50 MCG (2000 UT) TABS tablet Take 1 tablet by mouth daily      fluticasone (FLONASE) 50 MCG/ACT nasal spray 2 sprays by Each Nostril route daily      Lidocaine HCl (L-E) gel Apply topically once      simvastatin (ZOCOR) 10 MG tablet TAKE 1 TABLET BY MOUTH AT  BEDTIME. 30 tablet 11    calcium carbonate-vitamin D 600-200 MG-UNIT TABS Take 1 tablet by mouth 2 times daily 60 tablet 11    levothyroxine (SYNTHROID) 100 MCG tablet Take 1 tablet by mouth Daily TAKE 1 TABLET BY MOUTH IN THE MORNING 30 MINUTES BEFORE BREAKFAST OR OTHER MEDICATIONS. 90 tablet 3    spironolactone (ALDACTONE) 25 MG tablet TAKE 1 TABLET BY MOUTH DAILY 30 tablet 11    Omega-3 Fatty Acids (FISH OIL) 1200 MG CAPS TAKE 1 CAPSULE BY MOUTH IN THE MORNING. 30 capsule 11    amLODIPine (NORVASC) 5 MG tablet TAKE 1 TABLET BY MOUTH AT  BEDTIME. 30 tablet 11     No current facility-administered medications for this visit. Return in about 1 month (around 8/23/2021). An electronic signature was used to authenticate this note.     --Sarahi Dan MD on 7/23/2021 at 9:46 AM

## 2021-07-23 ENCOUNTER — OFFICE VISIT (OUTPATIENT)
Dept: PRIMARY CARE CLINIC | Age: 86
End: 2021-07-23
Payer: COMMERCIAL

## 2021-07-23 VITALS
DIASTOLIC BLOOD PRESSURE: 61 MMHG | HEIGHT: 64 IN | WEIGHT: 198 LBS | BODY MASS INDEX: 33.8 KG/M2 | SYSTOLIC BLOOD PRESSURE: 133 MMHG | RESPIRATION RATE: 16 BRPM | HEART RATE: 66 BPM | OXYGEN SATURATION: 97 % | TEMPERATURE: 98 F

## 2021-07-23 DIAGNOSIS — I10 HTN (HYPERTENSION), BENIGN: Primary | Chronic | ICD-10-CM

## 2021-07-23 DIAGNOSIS — M15.9 PRIMARY OSTEOARTHRITIS INVOLVING MULTIPLE JOINTS: ICD-10-CM

## 2021-07-23 DIAGNOSIS — N32.81 OVERACTIVE BLADDER: ICD-10-CM

## 2021-07-23 DIAGNOSIS — R26.2 DIFFICULTY WALKING: ICD-10-CM

## 2021-07-23 DIAGNOSIS — M50.30 DDD (DEGENERATIVE DISC DISEASE), CERVICAL: ICD-10-CM

## 2021-07-23 DIAGNOSIS — J45.20 MILD INTERMITTENT ASTHMA WITHOUT COMPLICATION: ICD-10-CM

## 2021-07-23 DIAGNOSIS — R05.9 COUGH: ICD-10-CM

## 2021-07-23 PROCEDURE — G8417 CALC BMI ABV UP PARAM F/U: HCPCS | Performed by: FAMILY MEDICINE

## 2021-07-23 PROCEDURE — 1090F PRES/ABSN URINE INCON ASSESS: CPT | Performed by: FAMILY MEDICINE

## 2021-07-23 PROCEDURE — 1123F ACP DISCUSS/DSCN MKR DOCD: CPT | Performed by: FAMILY MEDICINE

## 2021-07-23 RX ORDER — LORATADINE 10 MG/1
TABLET ORAL
Qty: 90 TABLET | Refills: 3 | Status: SHIPPED
Start: 2021-07-23

## 2021-07-27 ENCOUNTER — TELEPHONE (OUTPATIENT)
Dept: PRIMARY CARE CLINIC | Age: 86
End: 2021-07-27

## 2021-07-27 NOTE — TELEPHONE ENCOUNTER
[facility] sent a message that pt had mentioned you wanted her to start Atrovent could you please send them an order with starting dosage and frequency.

## 2021-08-17 NOTE — PROGRESS NOTES
8/18/2021    Home visit medically necessary in lieu of an office visit due to: [facility] resident, uses walker, difficult to get out. HPI:  Patient says she has lost control of her bladder, urine constantly leaks out. She doesn't think Kegel exercises have helped. She wears Depends. Her cough has cleared up using Flonase and her nose is no longer draining. She has had no difficulty breathing. She has been walking halls with rollator and has no CASTANO. She has not been having much arthritis pain since she is using BioFreeze and taking Tylenol. She has had no falls in this month. She has been moving her bowels regularly. She tries to elevate her legs as much as possible to prevent leg swelling; and using compression hose when they help her put them on. REVIEW OF SYSTEMS:    GENERAL: Appetite good. No weight change, generally healthy, no change in strength or exercise tolerance. CARDIOVASCULAR: No chest pains, no murmurs, no palpitations, no syncope, no orthopnea. LEGS HAVE BEEN SWELLING. RESPIRATORY: No shortness of breath, no pain with breathing, no wheezing, no hemoptysis, no cough. BREASTS:  No lumps, discharge or pain. GASTROINTESTINAL: No change in appetite, no dysphagia, no heartburn, no abdominal pains, no diarrhea, no bowel habit changes, no emesis, no melena, no hemorrhoids. CONSTIPATION SOMETIMES. GENITOURINARY: No incontinence or retention, no urinary urgency, no frequent UTIs, no dysuria, no change in nature of urine. STRESS INCONTINENCE AT TIMES. MUSCULOSKELETAL: No swelling or redness of joints, no limitation of range of motion, no weakness or numbness. HAS HAD JT PAINS BUT NOT NOW. NEURO/PSYCH: No weakness, no tremor, no seizures, no changes in mentation, no ataxia. No depressive symptoms, no changes in sleep habits, no changes in thought content. MEMORY PROBLEMS. All other systems negative.     Allergies   Allergen Reactions    Flomax [Tamsulosin Hcl] Other (See Comments)     Causes chest pain    Ace Inhibitors Swelling     Causes lip swelling    Alendronate Sodium Other (See Comments)     Pt unsure of allergies    Lasix [Furosemide] Other (See Comments)     weakness    Lisinopril Rash    Penicillins Rash     Rash    Propolis Other (See Comments)    Vioxx [Rofecoxib] Other (See Comments)     Past Medical History:   Diagnosis Date    Asthma     Atrioventricular (AV) dissociation     AV block, 1st degree 2/11/2015    Diabetes mellitus ([address]     GERD (gastroesophageal reflux disease)     Herniated disc     Cervical    Hyperlipidemia     Hypertension     Mild aortic regurgitation 11/5/13    Mitral regurgitation 11/5/13    mild-moderate    Nontoxic uninodular goiter     OA (osteoarthritis of spine)     cervical spine    Obesity      Past Surgical History:   Procedure Laterality Date    A-V CARDIAC PACEMAKER INSERTION Left 06/13/2016    Dual chamber pacemaker [address] by Dr Leighann Hearn History     Socioeconomic History    Marital status:      Spouse name: Not on file    Number of children: None    Years of education: Not on file    Highest education level: Not on file   Occupational History    Not on file   Tobacco Use    Smoking status: Never Smoker    Smokeless tobacco: Never Used   Substance and Sexual Activity    Alcohol use: No     Frequency: Never     Binge frequency: Never     Comment:      Drug use: No    Sexual activity: Not Currently   Social History Narrative    1 cup tea daily; occ pop; no coffee      Vaccine Date   COVID-19 12/23/20, 1/13/21   Influenza 10/20/20   Prevnar 13 12/29/2015   Pneumovax 23 1/12/2017     No family history on file.     PHYSICAL EXAM:   Vitals:    08/18/21 0808   BP: (!) 141/63   Site: Left Wrist   Position: Sitting   Pulse: 66   Resp: 18   Temp: 97.1 °F (36.2 °C)   TempSrc: Temporal   SpO2: 97%   Weight: 204 lb 3.2 oz (92.6 kg)   Height: 5' 4\" (1.626 m)     Estimated body mass index is 35.05 kg/m² as calculated from the following:    Height as of this encounter: 5' 4\" (1.626 m). Weight as of this encounter: 204 lb 3.2 oz (92.6 kg). GEN:  elderly WDWN female patient seated in recliner in NAD. HEAD:  atraumatic, normocephalic. EYES:  EOMI, PERRL, no cataracts, conjunctivae appear normal.  ENT:   Good hearing, EACs without wax, TM's normal, nasal septum midline, no significant congestion, oral cavity without lesions, poor-fair dentition, no dentures. NECK:  fair-good ROM, no palpable masses, no carotid bruits, no JVD. LUNGS:  clear to ausc, no rales, rhonchi or wheezes. HEART:  RRR, no murmurs or gallops. ABD:  soft, non-tender to palp, no palp masses or HSM, normal BS. BACK:  no scoliosis or kyphosis, non-tender to palp. EXTREMITIES: Trace-no edema, no ulcerations, varicosities or erythema. No gross deformities. MUSCULOSKELETAL: Good ROM of all joints, non tender to palp and or with movement. SKIN:  No ulcerations or breakdown, rash, ecchymosis, or other lesions. NEURO:  No tremor, motor UEs 5/5 LEs  5/5, sensory normal, able to stand and walk slowly using rollator with mild ataxia. PSYCH:  Pleasant and cooperative. Fluid speech, oriented to person, place and time. No delusional statements. Medications reviewed. Labs 5/20/21 HH 11.9/36.4, GFR 47, Na 128, Cl 95, LFT nl, A1c 6.1, TSH 1.151, , Tri 76, HDL 47, LDL 57   2/22/21 Na 130, GFR 52   11/20/20 GFR 52, Na 128, lytes o/w nl  8/21/20 GFR 52, Na 127, Glu 81, Alb 3;4       ASSESSMENT:  Elderly female has HTN (hypertension), benign; Diabetes mellitus type 2, diet-controlled ([facility] DDD (degenerative disc disease), cervical; Non-rheumatic mitral regurgitation; AI (aortic insufficiency); Pacemaker; Hyperlipidemia LDL goal <100; Acquired hypothyroidism; Syncope and collapse; Hyponatremia; Primary osteoarthritis involving multiple joints; Mild intermittent asthma without complication;  History of complete heart block; Depression; Difficulty walking; Chronic fatigue; Overactive bladder; Constipation; and Urinary incontinence on their problem list.     Donnie was seen today for incontinence and joint pain. Diagnoses and all orders for this visit:    HTN (hypertension), benign  -     Basic Metabolic Panel; Future    Continuous leakage of urine    Primary osteoarthritis involving multiple joints    Difficulty walking    Constipation, unspecified constipation type    Diabetes mellitus type 2, diet-controlled (HCC)  -     Hemoglobin A1C; Future        PLAN:    Check labs. Discussed diet and activity level. Continue Flonase for rhinitis. Continue Kegel exercises. Continue to use BioFreeze on painful joints. Continue Tylenol for pain as needed. Encouraged to drink plenty of fluids. Check BMP & A1c every 3 months, next in November. Continue other meds as per Rx List. Recheck 1 month. 40 minutes spent on visit, 25 minutes involved education/counseling regarding HTN, urinary, and DJD disease processes, treatment options, meds and coordination of care. Current Outpatient Medications   Medication Sig Dispense Refill    loratadine (CLARITIN) 10 MG tablet TAKE 1 TABLET BY MOUTH ONCE A DAY 90 tablet 3    polyethylene glycol (GLYCOLAX) 17 GM/SCOOP powder Take 17 g by mouth daily as needed (constipation) 902 g 5    folic acid (FOLVITE) 163 MCG tablet TAKE 1 TABLET BY MOUTH IN  THE MORNING. 30 tablet 11    metoprolol succinate (TOPROL XL) 50 MG extended release tablet TAKE 1 TABLET BY MOUTH DAILY 30 tablet 7    metoprolol succinate (TOPROL XL) 25 MG extended release tablet TAKE 1 TABLET BY MOUTH AT  BEDTIME.  90 tablet 3    ipratropium (ATROVENT) 0.06 % nasal spray 2 sprays by Each Nostril route 4 times daily as needed for Rhinitis 1 Bottle 5    acetaminophen (ACETAMINOPHEN EXTRA STRENGTH) 500 MG tablet TAKE 1 TABLET BY MOUTH FOUR TIMES DAILY AS NEEDED FOR PAIN. 120 tablet 11    aspirin (ASPIR-LOW) 81 MG EC tablet TAKE 1 TABLET BY MOUTH ONCE DAILY 30 tablet 11    Incontinence Supply Disposable MISC 2 each by Does not apply route as needed (Incontinence) Use prn. Dispense 2 cases. 11 Refills. Size large 2 each 11    vitamin D (CHOLECALCIFEROL) 50 MCG (2000 UT) TABS tablet Take 1 tablet by mouth daily      fluticasone (FLONASE) 50 MCG/ACT nasal spray 2 sprays by Each Nostril route daily      Lidocaine HCl (L-E) gel Apply topically once      simvastatin (ZOCOR) 10 MG tablet TAKE 1 TABLET BY MOUTH AT  BEDTIME. 30 tablet 11    calcium carbonate-vitamin D 600-200 MG-UNIT TABS Take 1 tablet by mouth 2 times daily 60 tablet 11    levothyroxine (SYNTHROID) 100 MCG tablet Take 1 tablet by mouth Daily TAKE 1 TABLET BY MOUTH IN THE MORNING 30 MINUTES BEFORE BREAKFAST OR OTHER MEDICATIONS. 90 tablet 3    spironolactone (ALDACTONE) 25 MG tablet TAKE 1 TABLET BY MOUTH DAILY 30 tablet 11    Omega-3 Fatty Acids (FISH OIL) 1200 MG CAPS TAKE 1 CAPSULE BY MOUTH IN THE MORNING. 30 capsule 11    amLODIPine (NORVASC) 5 MG tablet TAKE 1 TABLET BY MOUTH AT  BEDTIME. 30 tablet 11     No current facility-administered medications for this visit. Return in about 1 month (around 9/18/2021). An electronic signature was used to authenticate this note.     --Juan Zafar MD on 8/18/2021 at 10:23 AM

## 2021-08-18 ENCOUNTER — OFFICE VISIT (OUTPATIENT)
Dept: PRIMARY CARE CLINIC | Age: 86
End: 2021-08-18
Payer: COMMERCIAL

## 2021-08-18 VITALS
TEMPERATURE: 97.1 F | RESPIRATION RATE: 18 BRPM | WEIGHT: 204.2 LBS | DIASTOLIC BLOOD PRESSURE: 63 MMHG | OXYGEN SATURATION: 97 % | SYSTOLIC BLOOD PRESSURE: 141 MMHG | HEIGHT: 64 IN | BODY MASS INDEX: 34.86 KG/M2 | HEART RATE: 66 BPM

## 2021-08-18 VITALS
OXYGEN SATURATION: 97 % | TEMPERATURE: 97.1 F | HEART RATE: 66 BPM | WEIGHT: 204.2 LBS | SYSTOLIC BLOOD PRESSURE: 141 MMHG | DIASTOLIC BLOOD PRESSURE: 63 MMHG | HEIGHT: 64 IN | BODY MASS INDEX: 34.86 KG/M2 | RESPIRATION RATE: 18 BRPM

## 2021-08-18 DIAGNOSIS — E11.9 DIABETES MELLITUS TYPE 2, DIET-CONTROLLED (HCC): ICD-10-CM

## 2021-08-18 DIAGNOSIS — I35.1 NONRHEUMATIC AORTIC VALVE INSUFFICIENCY: ICD-10-CM

## 2021-08-18 DIAGNOSIS — E11.9 DIABETES MELLITUS TYPE 2, DIET-CONTROLLED (HCC): Chronic | ICD-10-CM

## 2021-08-18 DIAGNOSIS — E03.9 ACQUIRED HYPOTHYROIDISM: Chronic | ICD-10-CM

## 2021-08-18 DIAGNOSIS — K59.00 CONSTIPATION, UNSPECIFIED CONSTIPATION TYPE: ICD-10-CM

## 2021-08-18 DIAGNOSIS — E78.5 HYPERLIPIDEMIA LDL GOAL <100: Chronic | ICD-10-CM

## 2021-08-18 DIAGNOSIS — N39.45 CONTINUOUS LEAKAGE OF URINE: ICD-10-CM

## 2021-08-18 DIAGNOSIS — I10 HTN (HYPERTENSION), BENIGN: Primary | Chronic | ICD-10-CM

## 2021-08-18 DIAGNOSIS — Z00.8 ENCOUNTER FOR OTHER GENERAL EXAMINATION: Primary | ICD-10-CM

## 2021-08-18 DIAGNOSIS — M15.9 PRIMARY OSTEOARTHRITIS INVOLVING MULTIPLE JOINTS: ICD-10-CM

## 2021-08-18 DIAGNOSIS — E87.1 HYPONATREMIA: ICD-10-CM

## 2021-08-18 DIAGNOSIS — N32.81 OVERACTIVE BLADDER: ICD-10-CM

## 2021-08-18 DIAGNOSIS — J45.20 MILD INTERMITTENT ASTHMA WITHOUT COMPLICATION: ICD-10-CM

## 2021-08-18 DIAGNOSIS — R26.2 DIFFICULTY WALKING: ICD-10-CM

## 2021-08-18 DIAGNOSIS — I10 HTN (HYPERTENSION), BENIGN: Chronic | ICD-10-CM

## 2021-08-18 DIAGNOSIS — M50.30 DDD (DEGENERATIVE DISC DISEASE), CERVICAL: ICD-10-CM

## 2021-08-18 PROBLEM — R32 URINARY INCONTINENCE: Status: ACTIVE | Noted: 2021-08-18

## 2021-08-18 PROCEDURE — 1123F ACP DISCUSS/DSCN MKR DOCD: CPT | Performed by: FAMILY MEDICINE

## 2021-08-18 PROCEDURE — 1090F PRES/ABSN URINE INCON ASSESS: CPT | Performed by: FAMILY MEDICINE

## 2021-08-18 PROCEDURE — G0439 PPPS, SUBSEQ VISIT: HCPCS | Performed by: FAMILY MEDICINE

## 2021-08-18 PROCEDURE — G8417 CALC BMI ABV UP PARAM F/U: HCPCS | Performed by: FAMILY MEDICINE

## 2021-08-18 PROCEDURE — 0509F URINE INCON PLAN DOCD: CPT | Performed by: FAMILY MEDICINE

## 2021-08-18 SDOH — ECONOMIC STABILITY: FOOD INSECURITY: WITHIN THE PAST 12 MONTHS, THE FOOD YOU BOUGHT JUST DIDN'T LAST AND YOU DIDN'T HAVE MONEY TO GET MORE.: NEVER TRUE

## 2021-08-18 SDOH — ECONOMIC STABILITY: FOOD INSECURITY: WITHIN THE PAST 12 MONTHS, YOU WORRIED THAT YOUR FOOD WOULD RUN OUT BEFORE YOU GOT MONEY TO BUY MORE.: NEVER TRUE

## 2021-08-18 ASSESSMENT — PATIENT HEALTH QUESTIONNAIRE - PHQ9
2. FEELING DOWN, DEPRESSED OR HOPELESS: 1
SUM OF ALL RESPONSES TO PHQ QUESTIONS 1-9: 2
2. FEELING DOWN, DEPRESSED OR HOPELESS: 1
1. LITTLE INTEREST OR PLEASURE IN DOING THINGS: 1
SUM OF ALL RESPONSES TO PHQ QUESTIONS 1-9: 2
SUM OF ALL RESPONSES TO PHQ QUESTIONS 1-9: 2
SUM OF ALL RESPONSES TO PHQ9 QUESTIONS 1 & 2: 2
SUM OF ALL RESPONSES TO PHQ QUESTIONS 1-9: 2
SUM OF ALL RESPONSES TO PHQ9 QUESTIONS 1 & 2: 2
1. LITTLE INTEREST OR PLEASURE IN DOING THINGS: 1

## 2021-08-18 ASSESSMENT — LIFESTYLE VARIABLES: HOW OFTEN DO YOU HAVE A DRINK CONTAINING ALCOHOL: 0

## 2021-08-18 ASSESSMENT — SOCIAL DETERMINANTS OF HEALTH (SDOH): HOW HARD IS IT FOR YOU TO PAY FOR THE VERY BASICS LIKE FOOD, HOUSING, MEDICAL CARE, AND HEATING?: NOT HARD AT ALL

## 2021-08-18 NOTE — PROGRESS NOTES
Medicare Annual Wellness Visit  Name: Danny Elizondo Date: 2021   MRN: [id number] Sex: Female   Age: 80 y.o. Ethnicity: Non- / Non    : 1933 Race: White (non-)      Sulma Calvillo is here for Medicare AWV    Screenings for behavioral, psychosocial and functional/safety risks, and cognitive dysfunction are all negative except as indicated below. These results, as well as other patient data from the [address] form, are documented in Flowsheets linked to this Encounter. Allergies   Allergen Reactions    Flomax [Tamsulosin Hcl] Other (See Comments)     Causes chest pain    Ace Inhibitors Swelling     Causes lip swelling    Alendronate Sodium Other (See Comments)     Pt unsure of allergies    Lasix [Furosemide] Other (See Comments)     weakness    Lisinopril Rash    Penicillins Rash     Rash    Propolis Other (See Comments)    Vioxx [Rofecoxib] Other (See Comments)         Prior to Visit Medications    Medication Sig Taking? Authorizing Provider   loratadine (CLARITIN) 10 MG tablet TAKE 1 TABLET BY MOUTH ONCE A DAY  Masha Andrade MD   polyethylene glycol (GLYCOLAX) 17 GM/SCOOP powder Take 17 g by mouth daily as needed (constipation)  Masha Andrade MD   folic acid (FOLVITE) 000 MCG tablet TAKE 1 TABLET BY MOUTH IN  THE MORNING. Masha Andrade MD   metoprolol succinate (TOPROL XL) 50 MG extended release tablet TAKE 1 TABLET BY MOUTH DAILY  Juan Mccauley MD   metoprolol succinate (TOPROL XL) 25 MG extended release tablet TAKE 1 TABLET BY MOUTH AT  BEDTIME. Juan Mccauley MD   ipratropium (ATROVENT) 0.06 % nasal spray 2 sprays by Each Nostril route 4 times daily as needed for Rhinitis  Masha Andrade MD   acetaminophen (ACETAMINOPHEN EXTRA STRENGTH) 500 MG tablet TAKE 1 TABLET BY MOUTH FOUR TIMES DAILY AS NEEDED FOR PAIN.   Masha Andrade MD   aspirin (ASPIR-LOW) 81 MG EC tablet TAKE 1 TABLET BY MOUTH ONCE DAILY  Masha Andrade MD   Incontinence Supply Disposable MISC 2 each by Does not apply route as needed (Incontinence) Use prn. Dispense 2 cases. 11 Refills. Size large  Catalina Schlatter, MD   vitamin D (CHOLECALCIFEROL) 50 MCG (2000 UT) TABS tablet Take 1 tablet by mouth daily  Historical Provider, MD   fluticasone (FLONASE) 50 MCG/ACT nasal spray 2 sprays by Each Nostril route daily  Historical Provider, MD   Lidocaine HCl (L-E) gel Apply topically once  Historical Provider, MD   simvastatin (ZOCOR) 10 MG tablet TAKE 1 TABLET BY MOUTH AT  BEDTIME. Catalina Schlatter, MD   calcium carbonate-vitamin D 600-200 MG-UNIT TABS Take 1 tablet by mouth 2 times daily  Catalina Schlatter, MD   levothyroxine (SYNTHROID) 100 MCG tablet Take 1 tablet by mouth Daily TAKE 1 TABLET BY MOUTH IN THE MORNING 30 MINUTES BEFORE BREAKFAST OR OTHER MEDICATIONS. Catalina Schlatter, MD   spironolactone (ALDACTONE) 25 MG tablet TAKE 1 TABLET BY MOUTH DAILY  Catalina Schlatter, MD   Omega-3 Fatty Acids (FISH OIL) 1200 MG CAPS TAKE 1 CAPSULE BY MOUTH IN THE MORNING. Catalina Schlatter, MD   amLODIPine (NORVASC) 5 MG tablet TAKE 1 TABLET BY MOUTH AT  BEDTIME. Catalina Schlatter, MD         Past Medical History:   Diagnosis Date    Asthma     Atrioventricular (AV) dissociation     AV block, 1st degree 2/11/2015    Diabetes mellitus ([address]     GERD (gastroesophageal reflux disease)     Herniated disc     Cervical    Hyperlipidemia     Hypertension     Mild aortic regurgitation 11/5/13    Mitral regurgitation 11/5/13    mild-moderate    Nontoxic uninodular goiter     OA (osteoarthritis of spine)     cervical spine    Obesity        Past Surgical History:   Procedure Laterality Date    A-V CARDIAC PACEMAKER INSERTION Left 06/13/2016    Dual chamber pacemaker [address] by Dr Lokesh Zuniga         No family history on file.     CareTeam (Including outside providers/suppliers regularly involved in providing care):   Patient Care Team:  Catalina Schlatter, MD as PCP - General ([address] )  Catalina Schlatter, MD as PCP - [facility] Provider  Leandro Augustin DO as Resident (Family Medicine)  PAMELA Trent as  (Care Coordinator)  Nicole Guajardo MD as Consulting Physician (Cardiac Electrophysiology)    Wt Readings from Last 3 Encounters:   08/18/21 204 lb 3.2 oz (92.6 kg)   08/18/21 204 lb 3.2 oz (92.6 kg)   07/23/21 198 lb (89.8 kg)     Vitals:    08/18/21 0807   BP: (!) 141/63   Site: Left Wrist   Position: Sitting   Pulse: 66   Resp: 18   Temp: 97.1 °F (36.2 °C)   TempSrc: Temporal   SpO2: 97%   Weight: 204 lb 3.2 oz (92.6 kg)   Height: 5' 4\" (1.626 m)     Body mass index is 35.05 kg/m². Based upon direct observation of the patient, evaluation of cognition reveals recent and remote memory intact. Patient's complete Health Risk Assessment and screening values have been reviewed and are found in Flowsheets. The following problems were reviewed today and where indicated follow up appointments were made and/or referrals ordered. Positive Risk Factor Screenings with Interventions:            General Health and ACP:  General  In general, how would you say your health is?: Good  In the past 7 days, have you experienced any of the following?  New or Increased Pain, New or Increased Fatigue, Loneliness, Social Isolation, Stress or Anger?: (!) Loneliness  Do you get the social and emotional support that you need?: Yes  Do you have a Living Will?: Yes  Advance Directives     Power of  Living Will ACP-Advance Directive ACP-Power of Marleny Silva on 08/03/21 Filed on 07/05/18 Filed [address] [facility] [place] Risk Interventions:  · Loneliness: patient declines any further intervention for this issue    Health Habits/Nutrition:  Health Habits/Nutrition  Do you exercise for at least 20 minutes 2-3 times per week?: Yes  Have you lost any weight without trying in the past 3 months?: No  Do you eat only one meal per day?: No  Have you seen the dentist within the past year?: Yes  Body mass index: (!) 35.05  Health Habits/Nutrition Interventions:  · 1/2 portions at meals. Hearing/Vision:  No exam data present  Hearing/Vision  Do you or your family notice any trouble with your hearing that hasn't been managed with hearing aids?: No  Do you have difficulty driving, watching TV, or doing any of your daily activities because of your eyesight?: No  Have you had an eye exam within the past year?: (!) No  Hearing/Vision Interventions:  · Vision concerns:  patient encouraged to make appointment with his/her eye specialist     ADL:  ADLs  In the past 7 days, did you need help from others to perform any of the following everyday activities? Eating, dressing, grooming, bathing, toileting, or walking/balance?: (!) Bathing, Walking/Balance  In the past 7 days, did you need help from others to take care of any of the following? Laundry, housekeeping, banking/finances, shopping, telephone use, food preparation, transportation, or taking medications?: [employer], Housekeeping, Shopping, Food Preparation, Transportation, Taking Medications  ADL Interventions:  · Patient lives at [address] and gets all the help she needs.     Personalized Preventive Plan   Current Health Maintenance Status  Immunization History   Administered Date(s) Administered    COVID-19, WowOwow, PF, 30mcg/0.3mL 12/23/2020, 01/13/2021    Influenza Vaccine, unspecified formulation 10/10/2013, 10/15/2014, 10/31/2014, 10/14/2015, 11/11/2016    Influenza Virus Vaccine 10/10/2013, 11/11/2016    Influenza, High Dose (Fluzone 65 yrs and older) 11/11/2016, 09/08/2017, 10/24/2019    Influenza, Quadv, adjuvanted, 65 yrs +, IM, PF (Fluad) 10/20/2020    Pneumococcal Conjugate 13-valent ([id number]) 12/29/2015    Pneumococcal Polysaccharide ([id number]) 01/12/2017    Tdap (Boostrix, Adacel) 11/11/2013, 05/20/2016, 05/30/2020    Zoster Live (Zostavax) 12/31/2012        Health Maintenance   Topic Date Due    Shingles Vaccine (2 of 3) 02/25/2013    Annual Wellness Visit (AWV) Never done    Flu vaccine (1) 09/01/2021    Lipid screen  05/20/2022    TSH testing  05/20/2022    Potassium monitoring  05/20/2022    Creatinine monitoring  05/20/2022    DTaP/Tdap/Td vaccine (4 - Td or Tdap) 05/30/2030    Pneumococcal 65+ years Vaccine  Completed    COVID-19 Vaccine  Completed    Hepatitis A vaccine  Aged Out    Hib vaccine  Aged Out    Meningococcal (ACWY) vaccine  Aged Out     Recommendations for [employer] Due: see orders and patient instructions/AVS.  . Recommended screening schedule for the next 5-10 years is provided to the patient in written form: see Patient Instructions/AVS.    Judie Centeno was seen today for medicare aw.     Diagnoses and all orders for this visit:    Encounter for other general examination    HTN (hypertension), benign    Nonrheumatic aortic valve insufficiency    Mild intermittent asthma without complication    Diabetes mellitus type 2, diet-controlled ([facility] [address]    Acquired hypothyroidism    DDD (degenerative disc disease), cervical    Primary osteoarthritis involving multiple joints    Overactive bladder    Constipation, unspecified constipation type    Difficulty walking    Hyperlipidemia LDL goal <100    Hyponatremia           Declines mammogram.

## 2021-08-18 NOTE — PATIENT INSTRUCTIONS
Personalized Preventive Plan for Rod Del Valle - 8/18/2021  Medicare offers a range of preventive health benefits. Some of the tests and screenings are paid in full while other may be subject to a deductible, co-insurance, and/or copay. Some of these benefits include a comprehensive review of your medical history including lifestyle, illnesses that may run in your family, and various assessments and screenings as appropriate. After reviewing your medical record and screening and assessments performed today your provider may have ordered immunizations, labs, imaging, and/or referrals for you. A list of these orders (if applicable) as well as your Preventive Care list are included within your After Visit Summary for your review. Other Preventive Recommendations:    · A preventive eye exam performed by an eye specialist is recommended every 1-2 years to screen for glaucoma; cataracts, macular degeneration, and other eye disorders. · A preventive dental visit is recommended every 6 months. · Try to get at least 150 minutes of exercise per week or 10,000 steps per day on a pedometer . · Order or download the FREE \"Exercise & Physical Activity: Your Everyday Guide\" from [employer]. Call 6-981.417.9479 or search [employer] online. · You need 3900-8923 mg of calcium and 8741-2455 IU of vitamin D per day. It is possible to meet your calcium requirement with diet alone, but a vitamin D supplement is usually necessary to meet this goal.  · When exposed to the sun, use a sunscreen that protects against both UVA and UVB radiation with an SPF of 30 or greater. Reapply every 2 to 3 hours or after sweating, drying off with a towel, or swimming. · Always wear a seat belt when traveling in a car. Always wear a helmet when riding a bicycle or motorcycle.

## 2021-09-03 LAB
AVERAGE GLUCOSE: 131
BUN BLDV-MCNC: 26 MG/DL
CALCIUM SERPL-MCNC: 9.7 MG/DL
CHLORIDE BLD-SCNC: 94 MMOL/L
CO2: 22 MMOL/L
CREAT SERPL-MCNC: 1 MG/DL
GFR CALCULATED: 63
GLUCOSE BLD-MCNC: 91 MG/DL
HBA1C MFR BLD: 6.2 %
POTASSIUM SERPL-SCNC: 5.2 MMOL/L
SODIUM BLD-SCNC: 128 MMOL/L

## 2021-09-06 LAB
BUN BLDV-MCNC: 31 MG/DL
CALCIUM SERPL-MCNC: 8.9 MG/DL
CHLORIDE BLD-SCNC: 94 MMOL/L
CO2: 18 MMOL/L
CREAT SERPL-MCNC: 1 MG/DL
GFR CALCULATED: 63
GLUCOSE BLD-MCNC: 106 MG/DL
POTASSIUM SERPL-SCNC: 5.1 MMOL/L
SODIUM BLD-SCNC: 128 MMOL/L

## 2021-09-07 DIAGNOSIS — I10 HTN (HYPERTENSION), BENIGN: Chronic | ICD-10-CM

## 2021-09-07 DIAGNOSIS — E11.9 DIABETES MELLITUS TYPE 2, DIET-CONTROLLED (HCC): ICD-10-CM

## 2021-09-14 NOTE — PROGRESS NOTES
9/15/2021    Home visit medically necessary in lieu of an office visit due to: [facility] resident, uses walker, difficult to get out. HPI:  Patient says she is doing okay but her hands are getting more stiff. She is using BioFreeze and taking Tylenol. She has not improved with her bladder, urine continued leaks out. She doesn't do her Kegel exercises regularly. She wears Depends. Her allergies are doing okay using Flonase. Her nose is no longer draining. She has had no difficulty breathing. She has been walking halls with rollator and has no CASTANO. She has not been having much arthritis pain since She has had no falls in this month. She has been moving her bowels well. She is trying to elevate her legs as much as possible to prevent leg swelling; and using compression hose when they help her put them on. REVIEW OF SYSTEMS:    GENERAL: Appetite good. No weight change, generally healthy, no change in strength or exercise tolerance. CARDIOVASCULAR: No chest pains, no murmurs, no palpitations, no syncope, no orthopnea. LEGS HAVE BEEN SWELLING. RESPIRATORY: No shortness of breath, no pain with breathing, no wheezing, no hemoptysis, no cough. BREASTS:  No lumps, discharge or pain. GASTROINTESTINAL: No change in appetite, no dysphagia, no heartburn, no abdominal pains, no diarrhea, no bowel habit changes, no emesis, no melena, no hemorrhoids. CONSTIPATION SOMETIMES. GENITOURINARY: No incontinence or retention, no urinary urgency, no frequent UTIs, no dysuria, no change in nature of urine. STRESS INCONTINENCE AT TIMES. MUSCULOSKELETAL: No swelling or redness of joints, no limitation of range of motion, no weakness or numbness. HAS HAD JT PAINS BUT NOT NOW. NEURO/PSYCH: No weakness, no tremor, no seizures, no changes in mentation, no ataxia. No depressive symptoms, no changes in sleep habits, no changes in thought content. MEMORY PROBLEMS. All other systems negative.     Allergies   Allergen Reactions    Flomax [Tamsulosin Hcl] Other (See Comments)     Causes chest pain    Ace Inhibitors Swelling     Causes lip swelling    Alendronate Sodium Other (See Comments)     Pt unsure of allergies    Lasix [Furosemide] Other (See Comments)     weakness    Lisinopril Rash    Penicillins Rash     Rash    Propolis Other (See Comments)    Vioxx [Rofecoxib] Other (See Comments)     Past Medical History:   Diagnosis Date    Asthma     Atrioventricular (AV) dissociation     AV block, 1st degree 2/11/2015    Diabetes mellitus ([address]     GERD (gastroesophageal reflux disease)     Herniated disc     Cervical    Hyperlipidemia     Hypertension     Mild aortic regurgitation 11/5/13    Mitral regurgitation 11/5/13    mild-moderate    Nontoxic uninodular goiter     OA (osteoarthritis of spine)     cervical spine    Obesity      Past Surgical History:   Procedure Laterality Date    A-V CARDIAC PACEMAKER INSERTION Left 06/13/2016    Dual chamber pacemaker [address] by Dr Laly Auguste History     Socioeconomic History    Marital status:      Spouse name: Not on file    Number of children: None    Years of education: Not on file    Highest education level: Not on file   Occupational History    Not on file   Tobacco Use    Smoking status: Never Smoker    Smokeless tobacco: Never Used   Substance and Sexual Activity    Alcohol use: No     Frequency: Never     Binge frequency: Never     Comment:      Drug use: No    Sexual activity: Not Currently   Social History Narrative    1 cup tea daily; occ pop; no coffee      Vaccine Date   COVID-19 12/23/20, 1/13/21   Influenza 10/20/20   Prevnar 13 12/29/2015   Pneumovax 23 1/12/2017     No family history on file.     PHYSICAL EXAM:   Vitals:    09/15/21 0942   BP: 136/86   Site: Right Upper Arm   Position: Sitting   Pulse: 86   Resp: 20   Temp: 97.6 °F (36.4 °C)   TempSrc: Temporal   SpO2: 97%   Weight: 203 lb (92.1 kg)   Height: 5' 4\" (1.626 m)     Estimated body mass index is 34.84 kg/m² as calculated from the following:    Height as of this encounter: 5' 4\" (1.626 m). Weight as of this encounter: 203 lb (92.1 kg). GEN:  elderly [facility] female patient seated in recliner in NAD. HEAD:  atraumatic, normocephalic. EYES:  EOMI, PERRL, no cataracts, conjunctivae appear normal.  ENT:   Good hearing, EACs without wax, TM's normal, nasal septum midline, no significant congestion, oral cavity without lesions, poor-fair dentition, no dentures. NECK:  fair-good ROM, no palpable masses, no carotid bruits, no JVD. LUNGS:  clear to ausc, no rales, rhonchi or wheezes. HEART:  RRR, no murmurs or gallops. ABD:  soft, non-tender to palp, no palp masses or HSM, normal BS. BACK:  no scoliosis or kyphosis, non-tender to palp. EXTREMITIES: Trace-no edema, no ulcerations, varicosities or erythema. No gross deformities. MUSCULOSKELETAL: Good ROM of all joints, non tender to palp and or with movement. SKIN:  No ulcerations or breakdown, rash, ecchymosis, or other lesions. NEURO:  No tremor, motor UEs 5/5 LEs  5/5, sensory normal, able to stand and walk slowly using rollator with mild ataxia. PSYCH:  Pleasant and cooperative. Fluid speech, oriented to person, place and time. No delusional statements. Medications reviewed. Labs 9/7/21 GFR 63, Na 128, A1c 6.2  5/20/21 HH 11.9/36.4, GFR 47, Na 128, Cl 95, LFT nl, A1c 6.1, TSH 1.151, , Tri 76, HDL 47, LDL 57   2/22/21 Na 130, GFR 52   11/20/20 GFR 52, Na 128, lytes o/w nl    ASSESSMENT:  Elderly female has HTN (hypertension), benign; Diabetes mellitus type 2, diet-controlled ([address] DDD (degenerative disc disease), cervical; Non-rheumatic mitral regurgitation; AI (aortic insufficiency); Pacemaker; Hyperlipidemia LDL goal <100; Acquired hypothyroidism; Syncope and collapse;  Hyponatremia; Primary osteoarthritis involving multiple joints; Mild intermittent asthma without complication; History of complete heart block; Depression; Difficulty walking; Chronic fatigue; Overactive bladder; Constipation; and Urinary incontinence on their problem list.     Donnie was seen today for joint pain. Diagnoses and all orders for this visit:    HTN (hypertension), benign    Primary osteoarthritis involving multiple joints    Overactive bladder    Constipation, unspecified constipation type    Pacemaker    Diabetes mellitus type 2, diet-controlled ([facility] [address]        PLAN:    Continue to use BioFreeze on painful hand joints. Continue Tylenol for pain as needed. Discussed diet and activity level. Continue Flonase for rhinitis. Discussed Kegel exercises 45/day. Encouraged to drink plenty of fluids. Check BMP & A1c every 3 months, next in November. Continue other meds as per Rx List. Recheck 1 month. 40 minutes spent on visit, 25 minutes involved education/counseling regarding HTN, urinary, and DJD disease processes, treatment options, meds and coordination of care. Current Outpatient Medications   Medication Sig Dispense Refill    loratadine (CLARITIN) 10 MG tablet TAKE 1 TABLET BY MOUTH ONCE A DAY 90 tablet 3    polyethylene glycol (GLYCOLAX) 17 GM/SCOOP powder Take 17 g by mouth daily as needed (constipation) 050 g 5    folic acid (FOLVITE) 136 MCG tablet TAKE 1 TABLET BY MOUTH IN  THE MORNING. 30 tablet 11    metoprolol succinate (TOPROL XL) 50 MG extended release tablet TAKE 1 TABLET BY MOUTH DAILY 30 tablet 7    metoprolol succinate (TOPROL XL) 25 MG extended release tablet TAKE 1 TABLET BY MOUTH AT  BEDTIME.  90 tablet 3    ipratropium (ATROVENT) 0.06 % nasal spray 2 sprays by Each Nostril route 4 times daily as needed for Rhinitis 1 Bottle 5    acetaminophen (ACETAMINOPHEN EXTRA STRENGTH) 500 MG tablet TAKE 1 TABLET BY MOUTH FOUR TIMES DAILY AS NEEDED FOR PAIN. 120 tablet 11    aspirin (ASPIR-LOW) 81 MG EC tablet TAKE 1 TABLET BY MOUTH ONCE DAILY 30 tablet 11    Incontinence Supply Disposable MISC 2 each by Does not apply route as needed (Incontinence) Use prn. Dispense 2 cases. 11 Refills. Size large 2 each 11    vitamin D (CHOLECALCIFEROL) 50 MCG (2000 UT) TABS tablet Take 1 tablet by mouth daily      fluticasone (FLONASE) 50 MCG/ACT nasal spray 2 sprays by Each Nostril route daily      Lidocaine HCl (L-E) gel Apply topically once      simvastatin (ZOCOR) 10 MG tablet TAKE 1 TABLET BY MOUTH AT  BEDTIME. 30 tablet 11    calcium carbonate-vitamin D 600-200 MG-UNIT TABS Take 1 tablet by mouth 2 times daily 60 tablet 11    levothyroxine (SYNTHROID) 100 MCG tablet Take 1 tablet by mouth Daily TAKE 1 TABLET BY MOUTH IN THE MORNING 30 MINUTES BEFORE BREAKFAST OR OTHER MEDICATIONS. 90 tablet 3    spironolactone (ALDACTONE) 25 MG tablet TAKE 1 TABLET BY MOUTH DAILY 30 tablet 11    Omega-3 Fatty Acids (FISH OIL) 1200 MG CAPS TAKE 1 CAPSULE BY MOUTH IN THE MORNING. 30 capsule 11    amLODIPine (NORVASC) 5 MG tablet TAKE 1 TABLET BY MOUTH AT  BEDTIME. 30 tablet 11     No current facility-administered medications for this visit. Return in about 1 month (around 10/15/2021). An electronic signature was used to authenticate this note.     --Zain Burgos MD on 9/15/2021 at 10:05 AM

## 2021-09-15 ENCOUNTER — OFFICE VISIT (OUTPATIENT)
Dept: PRIMARY CARE CLINIC | Age: 86
End: 2021-09-15
Payer: COMMERCIAL

## 2021-09-15 VITALS
HEART RATE: 86 BPM | OXYGEN SATURATION: 97 % | HEIGHT: 64 IN | WEIGHT: 203 LBS | DIASTOLIC BLOOD PRESSURE: 86 MMHG | RESPIRATION RATE: 20 BRPM | TEMPERATURE: 97.6 F | BODY MASS INDEX: 34.66 KG/M2 | SYSTOLIC BLOOD PRESSURE: 136 MMHG

## 2021-09-15 DIAGNOSIS — E11.9 DIABETES MELLITUS TYPE 2, DIET-CONTROLLED (HCC): Chronic | ICD-10-CM

## 2021-09-15 DIAGNOSIS — K59.00 CONSTIPATION, UNSPECIFIED CONSTIPATION TYPE: ICD-10-CM

## 2021-09-15 DIAGNOSIS — I10 HTN (HYPERTENSION), BENIGN: Primary | Chronic | ICD-10-CM

## 2021-09-15 DIAGNOSIS — N32.81 OVERACTIVE BLADDER: ICD-10-CM

## 2021-09-15 DIAGNOSIS — Z95.0 PACEMAKER: Chronic | ICD-10-CM

## 2021-09-15 DIAGNOSIS — M15.9 PRIMARY OSTEOARTHRITIS INVOLVING MULTIPLE JOINTS: ICD-10-CM

## 2021-09-15 PROCEDURE — 1090F PRES/ABSN URINE INCON ASSESS: CPT | Performed by: FAMILY MEDICINE

## 2021-09-15 PROCEDURE — 1123F ACP DISCUSS/DSCN MKR DOCD: CPT | Performed by: FAMILY MEDICINE

## 2021-09-15 PROCEDURE — G8417 CALC BMI ABV UP PARAM F/U: HCPCS | Performed by: FAMILY MEDICINE

## 2021-10-12 ENCOUNTER — OFFICE VISIT (OUTPATIENT)
Dept: PRIMARY CARE CLINIC | Age: 86
End: 2021-10-12
Payer: COMMERCIAL

## 2021-10-12 VITALS
HEIGHT: 64 IN | TEMPERATURE: 97.8 F | HEART RATE: 74 BPM | WEIGHT: 203.8 LBS | BODY MASS INDEX: 34.79 KG/M2 | SYSTOLIC BLOOD PRESSURE: 135 MMHG | DIASTOLIC BLOOD PRESSURE: 78 MMHG | OXYGEN SATURATION: 95 % | RESPIRATION RATE: 20 BRPM

## 2021-10-12 DIAGNOSIS — M15.9 PRIMARY OSTEOARTHRITIS INVOLVING MULTIPLE JOINTS: ICD-10-CM

## 2021-10-12 DIAGNOSIS — E11.9 DIABETES MELLITUS TYPE 2, DIET-CONTROLLED (HCC): Chronic | ICD-10-CM

## 2021-10-12 DIAGNOSIS — N32.81 OVERACTIVE BLADDER: ICD-10-CM

## 2021-10-12 DIAGNOSIS — J45.20 MILD INTERMITTENT ASTHMA WITHOUT COMPLICATION: ICD-10-CM

## 2021-10-12 DIAGNOSIS — I10 HTN (HYPERTENSION), BENIGN: Primary | Chronic | ICD-10-CM

## 2021-10-12 DIAGNOSIS — Z23 NEED FOR IMMUNIZATION AGAINST INFLUENZA: ICD-10-CM

## 2021-10-12 PROCEDURE — G8484 FLU IMMUNIZE NO ADMIN: HCPCS | Performed by: FAMILY MEDICINE

## 2021-10-12 PROCEDURE — 1123F ACP DISCUSS/DSCN MKR DOCD: CPT | Performed by: FAMILY MEDICINE

## 2021-10-12 PROCEDURE — G8417 CALC BMI ABV UP PARAM F/U: HCPCS | Performed by: FAMILY MEDICINE

## 2021-10-12 PROCEDURE — 1090F PRES/ABSN URINE INCON ASSESS: CPT | Performed by: FAMILY MEDICINE

## 2021-10-12 PROCEDURE — 90694 VACC AIIV4 NO PRSRV 0.5ML IM: CPT | Performed by: FAMILY MEDICINE

## 2021-10-12 PROCEDURE — G0008 ADMIN INFLUENZA VIRUS VAC: HCPCS | Performed by: FAMILY MEDICINE

## 2021-10-12 NOTE — PROGRESS NOTES
10/12/2021    Home visit medically necessary in lieu of an office visit due to: [facility] resident, uses walker, difficult to get out. HPI:  Patient says she is doing okay. Her hands are about the same and her feet also bother her. She is using BioFreeze and taking Tylenol. She has improved with her bladder, urine continues leaks out some. She is doing her Kegel exercises regularly. She wears Depends. Her allergies are doing about the same using Flonase. Her nose is no longer draining. She has had no difficulty breathing. She has been walking halls with rollator and has no CASTANO. She has not had any falls in this month. She has been moving her bowels well. She tries to elevate her legs as much as possible to prevent leg swelling; and using compression hose when they help her put them on. REVIEW OF SYSTEMS:    GENERAL: Appetite good. No weight change, generally healthy, no change in strength or exercise tolerance. CARDIOVASCULAR: No chest pains, no murmurs, no palpitations, no syncope, no orthopnea. LEGS HAVE BEEN SWELLING. RESPIRATORY: No shortness of breath, no pain with breathing, no wheezing, no hemoptysis, no cough. BREASTS:  No lumps, discharge or pain. GASTROINTESTINAL: No change in appetite, no dysphagia, no heartburn, no abdominal pains, no diarrhea, no bowel habit changes, no emesis, no melena, no hemorrhoids. CONSTIPATION SOMETIMES. GENITOURINARY: No incontinence or retention, no urinary urgency, no frequent UTIs, no dysuria, no change in nature of urine. STRESS INCONTINENCE AT TIMES. MUSCULOSKELETAL: No swelling or redness of joints, no limitation of range of motion, no weakness or numbness. HAS HAD JT PAINS BUT NOT NOW. NEURO/PSYCH: No weakness, no tremor, no seizures, no changes in mentation, no ataxia. No depressive symptoms, no changes in sleep habits, no changes in thought content. MEMORY PROBLEMS. All other systems negative.     Allergies   Allergen Reactions    Flomax [Tamsulosin Hcl] Other (See Comments)     Causes chest pain    Ace Inhibitors Swelling     Causes lip swelling    Alendronate Sodium Other (See Comments)     Pt unsure of allergies    Lasix [Furosemide] Other (See Comments)     weakness    Lisinopril Rash    Penicillins Rash     Rash    Propolis Other (See Comments)    Vioxx [Rofecoxib] Other (See Comments)     Past Medical History:   Diagnosis Date    Asthma     Atrioventricular (AV) dissociation     AV block, 1st degree 2/11/2015    Diabetes mellitus ([address]     GERD (gastroesophageal reflux disease)     Herniated disc     Cervical    Hyperlipidemia     Hypertension     Mild aortic regurgitation 11/5/13    Mitral regurgitation 11/5/13    mild-moderate    Nontoxic uninodular goiter     OA (osteoarthritis of spine)     cervical spine    Obesity      Past Surgical History:   Procedure Laterality Date    A-V CARDIAC PACEMAKER INSERTION Left 06/13/2016    Dual chamber pacemaker [address] by Dr Monica Ching History     Socioeconomic History    Marital status:      Spouse name: Not on file    Number of children: None    Years of education: Not on file    Highest education level: Not on file   Occupational History    Not on file   Tobacco Use    Smoking status: Never Smoker    Smokeless tobacco: Never Used   Substance and Sexual Activity    Alcohol use: No     Frequency: Never     Binge frequency: Never     Comment:      Drug use: No    Sexual activity: Not Currently   Social History Narrative    1 cup tea daily; occ pop; no coffee      Vaccine Date   COVID-19 12/23/20, 1/13/21   Influenza 10/20/20   Prevnar 13 12/29/2015   Pneumovax 23 1/12/2017     No family history on file.     PHYSICAL EXAM:   Vitals:    10/12/21 0906   BP: 135/78   Site: Right Upper Arm   Position: Sitting   Pulse: 74   Resp: 20   Temp: 97.8 °F (36.6 °C)   TempSrc: Temporal   SpO2: 95%   Weight: 203 lb 12.8 oz (92.4 kg)   Height: 5' 4\" (1.626 m) Estimated body mass index is 34.98 kg/m² as calculated from the following:    Height as of this encounter: 5' 4\" (1.626 m). Weight as of this encounter: 203 lb 12.8 oz (92.4 kg). GEN:  elderly [facility] female patient seated in recliner in NAD. HEAD:  atraumatic, normocephalic. EYES:  EOMI, PERRL, no cataracts, conjunctivae appear normal.  ENT:   Good hearing, EACs without wax, TM's normal, nasal septum midline, no significant congestion, oral cavity without lesions, poor-fair dentition, no dentures. NECK:  fair-good ROM, no palpable masses, no carotid bruits, no JVD. LUNGS:  clear to ausc, no rales, rhonchi or wheezes. HEART:  RRR, no murmurs or gallops. ABD:  soft, non-tender to palp, no palp masses or HSM, normal BS. BACK:  no scoliosis or kyphosis, non-tender to palp. EXTREMITIES: Trace-no edema, no ulcerations, varicosities or erythema. No gross deformities. MUSCULOSKELETAL: Good ROM of all joints, non tender to palp and or with movement. SKIN:  No ulcerations or breakdown, rash, ecchymosis, or other lesions. NEURO:  No tremor, motor UEs 5/5 LEs  5/5, sensory normal, able to stand and walk slowly using rollator with mild ataxia. PSYCH:  Pleasant and cooperative. Fluid speech, oriented to person, place and time. No delusional statements. Medications reviewed. Labs 9/7/21 GFR 63, Na 128, A1c 6.2  5/20/21 HH 11.9/36.4, GFR 47, Na 128, Cl 95, LFT nl, A1c 6.1, TSH 1.151, , Tri 76, HDL 47, LDL 57   2/22/21 Na 130, GFR 52   11/20/20 GFR 52, Na 128, lytes o/w nl    ASSESSMENT:  Elderly female has HTN (hypertension), benign; Diabetes mellitus type 2, diet-controlled ([facility] [address] DDD (degenerative disc disease), cervical; Non-rheumatic mitral regurgitation; AI (aortic insufficiency); Pacemaker; Hyperlipidemia LDL goal <100; Acquired hypothyroidism; Syncope and collapse; Hyponatremia; Primary osteoarthritis involving multiple joints; Mild intermittent asthma without complication;  History TIMES DAILY AS NEEDED FOR PAIN. 120 tablet 11    aspirin (ASPIR-LOW) 81 MG EC tablet TAKE 1 TABLET BY MOUTH ONCE DAILY 30 tablet 11    Incontinence Supply Disposable MISC 2 each by Does not apply route as needed (Incontinence) Use prn. Dispense 2 cases. 11 Refills. Size large 2 each 11    vitamin D (CHOLECALCIFEROL) 50 MCG (2000 UT) TABS tablet Take 1 tablet by mouth daily      fluticasone (FLONASE) 50 MCG/ACT nasal spray 2 sprays by Each Nostril route daily      Lidocaine HCl (L-E) gel Apply topically once      simvastatin (ZOCOR) 10 MG tablet TAKE 1 TABLET BY MOUTH AT  BEDTIME. 30 tablet 11    calcium carbonate-vitamin D 600-200 MG-UNIT TABS Take 1 tablet by mouth 2 times daily 60 tablet 11    levothyroxine (SYNTHROID) 100 MCG tablet Take 1 tablet by mouth Daily TAKE 1 TABLET BY MOUTH IN THE MORNING 30 MINUTES BEFORE BREAKFAST OR OTHER MEDICATIONS. 90 tablet 3    spironolactone (ALDACTONE) 25 MG tablet TAKE 1 TABLET BY MOUTH DAILY 30 tablet 11    Omega-3 Fatty Acids (FISH OIL) 1200 MG CAPS TAKE 1 CAPSULE BY MOUTH IN THE MORNING. 30 capsule 11    amLODIPine (NORVASC) 5 MG tablet TAKE 1 TABLET BY MOUTH AT  BEDTIME. 30 tablet 11     No current facility-administered medications for this visit. Return in about 1 month (around 11/12/2021). An electronic signature was used to authenticate this note.     --Bela Ndiaye MD on 10/12/2021 at 9:31 AM

## 2021-10-13 ENCOUNTER — OFFICE VISIT (OUTPATIENT)
Dept: CARDIOLOGY CLINIC | Age: 86
End: 2021-10-13
Payer: COMMERCIAL

## 2021-10-13 VITALS
HEART RATE: 60 BPM | RESPIRATION RATE: 16 BRPM | HEIGHT: 64 IN | DIASTOLIC BLOOD PRESSURE: 72 MMHG | BODY MASS INDEX: 34.45 KG/M2 | WEIGHT: 201.8 LBS | SYSTOLIC BLOOD PRESSURE: 120 MMHG

## 2021-10-13 DIAGNOSIS — I38 VHD (VALVULAR HEART DISEASE): ICD-10-CM

## 2021-10-13 DIAGNOSIS — R29.898 WEAKNESS OF BOTH LOWER EXTREMITIES: ICD-10-CM

## 2021-10-13 DIAGNOSIS — Z86.79 HISTORY OF COMPLETE HEART BLOCK: ICD-10-CM

## 2021-10-13 DIAGNOSIS — R26.9 GAIT DIFFICULTY: ICD-10-CM

## 2021-10-13 DIAGNOSIS — Z86.39 H/O HYPERLIPIDEMIA: ICD-10-CM

## 2021-10-13 DIAGNOSIS — Z79.899 ON STATIN THERAPY: ICD-10-CM

## 2021-10-13 DIAGNOSIS — E66.09 NON MORBID OBESITY DUE TO EXCESS CALORIES: ICD-10-CM

## 2021-10-13 DIAGNOSIS — M50.30 DDD (DEGENERATIVE DISC DISEASE), CERVICAL: ICD-10-CM

## 2021-10-13 DIAGNOSIS — E11.9 DIABETES MELLITUS TYPE 2, DIET-CONTROLLED (HCC): ICD-10-CM

## 2021-10-13 DIAGNOSIS — M15.9 PRIMARY OSTEOARTHRITIS INVOLVING MULTIPLE JOINTS: ICD-10-CM

## 2021-10-13 DIAGNOSIS — I10 ESSENTIAL HYPERTENSION: Primary | ICD-10-CM

## 2021-10-13 DIAGNOSIS — E04.9 NODULAR GOITER: ICD-10-CM

## 2021-10-13 DIAGNOSIS — N18.2 CKD (CHRONIC KIDNEY DISEASE) STAGE 2, GFR 60-89 ML/MIN: ICD-10-CM

## 2021-10-13 DIAGNOSIS — Z91.81 H/O FALL: ICD-10-CM

## 2021-10-13 DIAGNOSIS — E87.1 HYPONATREMIA: ICD-10-CM

## 2021-10-13 DIAGNOSIS — Z95.0 PACEMAKER: ICD-10-CM

## 2021-10-13 DIAGNOSIS — Z87.898 H/O SYNCOPE: ICD-10-CM

## 2021-10-13 DIAGNOSIS — J45.20 MILD INTERMITTENT ASTHMA WITHOUT COMPLICATION: ICD-10-CM

## 2021-10-13 DIAGNOSIS — E03.9 HYPOTHYROIDISM, UNSPECIFIED TYPE: ICD-10-CM

## 2021-10-13 PROCEDURE — G8417 CALC BMI ABV UP PARAM F/U: HCPCS | Performed by: INTERNAL MEDICINE

## 2021-10-13 PROCEDURE — 1090F PRES/ABSN URINE INCON ASSESS: CPT | Performed by: INTERNAL MEDICINE

## 2021-10-13 PROCEDURE — 1036F TOBACCO NON-USER: CPT | Performed by: INTERNAL MEDICINE

## 2021-10-13 PROCEDURE — G8427 DOCREV CUR MEDS BY ELIG CLIN: HCPCS | Performed by: INTERNAL MEDICINE

## 2021-10-13 PROCEDURE — G8484 FLU IMMUNIZE NO ADMIN: HCPCS | Performed by: INTERNAL MEDICINE

## 2021-10-13 PROCEDURE — 4040F PNEUMOC VAC/ADMIN/RCVD: CPT | Performed by: INTERNAL MEDICINE

## 2021-10-13 PROCEDURE — 99214 OFFICE O/P EST MOD 30 MIN: CPT | Performed by: INTERNAL MEDICINE

## 2021-10-13 PROCEDURE — 1123F ACP DISCUSS/DSCN MKR DOCD: CPT | Performed by: INTERNAL MEDICINE

## 2021-10-13 PROCEDURE — 93000 ELECTROCARDIOGRAM COMPLETE: CPT | Performed by: INTERNAL MEDICINE

## 2021-10-13 NOTE — PROGRESS NOTES
OFFICE VISIT        PRIMARY CARE PHYSICIAN:    Juliana Sánchez MD       ALLERGIES / SENSITIVITIES:    Allergies   Allergen Reactions    Flomax [Tamsulosin Hcl] Other (See Comments)     Causes chest pain    Ace Inhibitors Swelling     Causes lip swelling    Flomax [Tamsulosin]     Alendronate Sodium Other (See Comments)     Pt unsure of allergies    Lasix [Furosemide] Other (See Comments)     weakness    Lisinopril Rash    Penicillins Rash     Rash    Propolis Other (See Comments)    Vioxx [Rofecoxib] Other (See Comments)        REVIEWED MEDICATIONS:      Current Outpatient Medications:     loratadine (CLARITIN) 10 MG tablet, TAKE 1 TABLET BY MOUTH ONCE A DAY, Disp: 90 tablet, Rfl: 3    polyethylene glycol (GLYCOLAX) 17 GM/SCOOP powder, Take 17 g by mouth daily as needed (constipation), Disp: 510 g, Rfl: 5    folic acid (FOLVITE) 868 MCG tablet, TAKE 1 TABLET BY MOUTH IN  THE MORNING., Disp: 30 tablet, Rfl: 11    metoprolol succinate (TOPROL XL) 50 MG extended release tablet, TAKE 1 TABLET BY MOUTH DAILY, Disp: 30 tablet, Rfl: 7    metoprolol succinate (TOPROL XL) 25 MG extended release tablet, TAKE 1 TABLET BY MOUTH AT  BEDTIME., Disp: 90 tablet, Rfl: 3    ipratropium (ATROVENT) 0.06 % nasal spray, 2 sprays by Each Nostril route 4 times daily as needed for Rhinitis, Disp: 1 Bottle, Rfl: 5    acetaminophen (ACETAMINOPHEN EXTRA STRENGTH) 500 MG tablet, TAKE 1 TABLET BY MOUTH FOUR TIMES DAILY AS NEEDED FOR PAIN., Disp: 120 tablet, Rfl: 11    aspirin (ASPIR-LOW) 81 MG EC tablet, TAKE 1 TABLET BY MOUTH ONCE DAILY, Disp: 30 tablet, Rfl: 11    Incontinence Supply Disposable MISC, 2 each by Does not apply route as needed (Incontinence) Use prn. Dispense 2 cases. 11 Refills.  Size large, Disp: 2 each, Rfl: 11    vitamin D (CHOLECALCIFEROL) 50 MCG (2000 UT) TABS tablet, Take 1 tablet by mouth daily, Disp: , Rfl:     fluticasone (FLONASE) 50 MCG/ACT nasal spray, 2 sprays by Each Nostril route daily, Disp: , Rfl:   Lidocaine HCl (L-E) gel, Apply topically once, Disp: , Rfl:     simvastatin (ZOCOR) 10 MG tablet, TAKE 1 TABLET BY MOUTH AT  BEDTIME., Disp: 30 tablet, Rfl: 11    calcium carbonate-vitamin D 600-200 MG-UNIT TABS, Take 1 tablet by mouth 2 times daily, Disp: 60 tablet, Rfl: 11    levothyroxine (SYNTHROID) 100 MCG tablet, Take 1 tablet by mouth Daily TAKE 1 TABLET BY MOUTH IN THE MORNING 30 MINUTES BEFORE BREAKFAST OR OTHER MEDICATIONS., Disp: 90 tablet, Rfl: 3    spironolactone (ALDACTONE) 25 MG tablet, TAKE 1 TABLET BY MOUTH DAILY, Disp: 30 tablet, Rfl: 11    Omega-3 Fatty Acids (FISH OIL) 1200 MG CAPS, TAKE 1 CAPSULE BY MOUTH IN THE MORNING., Disp: 30 capsule, Rfl: 11    amLODIPine (NORVASC) 5 MG tablet, TAKE 1 TABLET BY MOUTH AT  BEDTIME., Disp: 30 tablet, Rfl: 11      S: REASON FOR VISIT:    History of complete heart block, S/P pacemaker insertion, valvular heart disease and multiple risk factors for coronary artery diseaseLeny Fields is a pleasant, 72-year-old lady with cardiovascular history as described below. She resides at [facility]. She ambulates only with the use of a Rolator short distances because of bilateral lower extremity weakness. She has gait instability and is at a risk of falls. She denies chest pain or dyspnea with her limited activities. She denies orthopnea or PND's. She has bilateral lower extremity swelling, but denies any worsening: She does wear support hoses. She denies the subjective feeling of palpitations. She denies dizziness, presyncope or syncope. She has not had her pacemaker checked recently. She did get vaccinated for Covid and she did receive the flu vaccine this year also. REVIEW OF SYSTEMS:    CONSTITUTIONAL:  Denies fevers, chills, night sweats or fatigue. HEENT:  Denies any unusual headaches.  Denies recent changes in hearing or vision.  Denies dysphagia, hoarseness, hemoptysis, hematemesis or epistaxis.    ENDOCRINE:  Denies polyphagia, polydipsia or polyuria.  Denies heat or cold intolerance. MUSCULOSKELETAL:  She complains of bilateral lower extremity weakness but denies any arthralgias or myalgias. SKIN:  Denies rashes, ulcers or itching. HEME/LYMPH:  Denies any palpable lymph nodes, bleeding or easy bruisability. HEART:  As above. LUNGS:  Denies cough or sputum production. GI: Denies abdominal pain, nausea, vomiting, diarrhea, constipation, rectal bleeding or tarry stools. :  Denies hematuria or dysuria. PSYCHIATRIC:  Denies mood changes, anxiety or depression. NEUROLOGIC:  Denies memory loss, motor weakness, numbness, tingling or tremors.                    CARDIOVASCULAR HISTORY:    1.  Hypertension. 2.  Diabetes mellitus. 3.  Hyperlipidemia. 4.  Obesity. 5.  Echocardiogram done on 11/5/2013 showed normal left ventricular size with mild concentric left ventricular hypertrophy with proximal septal thickening with normal left ventricular wall motion with hyperdynamic left ventricular systolic function with an ejection fraction of more than 75% with stage I left ventricular diastolic dysfunction.  Normal right ventricular size and function.  Mild to moderate mitral regurgitation.  Mild aortic regurgitation. 6.  Lexiscan nuclear stress test done on 11/5/2013 was probably a normal study showing a small, reversible defect involving the anterolateral wall of the left ventricle near the apex, more consistent with shifting breast attenuation artifact and less likely the result of stress induced ischemia with the gated views showing no regional wall motion abnormality with possible hyperdynamic left ventricular systolic function. 7. Ace inhibitor allergy:  Causes lip swelling.    8. 2-D echocardiogram February 22, 2016 proximal septal thickening last hypertrophy and EF 75% and stage I diastolic dysfunction with mild mitral annular calcification and aortic valve appears mildly sclerotic and is trileaflet and there is mild aortic regurgitation and fat pad versus small pericardial effusion cannot be ruled out  9. Lexiscan stress test 2016 within normal limits showing no evidence of scars or stress-induced ischemia and the gated view showing no regional wall motion abnormality with possible hyperdynamic left ventricular systolic function with an ejection fraction of 87%and unchanged significantly from 2013  10.  Complete heart block with junctional escape rhythm with rates in the 30's and 40's and as low as 20 1. Status post dual chamber pacemaker by Dr. Hermila Bañuelos 2016 with a Quynh Busch DR model number 12/3/01 serial #[id number]  11.  Lexiscan nuclear stress test done on 2018 showed no evidence of stress-induced ischemia with an ejection fraction of 71%. 12.  Limited echo done on 2018 was reported as showing an ejection fraction of 50-55% with sigmoid septum with moderate asymmetric septal hypertrophy with normal right ventricular structure and function and no evidence of pericardial effusion. 13.  Syncopal episode in 2018:  Thought to be vasovagal.       PAST MEDICAL HISTORY:  1.  As under cardiovascular history. 2.  Nontoxic nodular goiter. 3.  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 4.  S/P complete hysterectomy in . 5.  Asthma. 6.  GERD.     FAMILY HISTORY:  Mother  in her 85'F from complications of pancreatic cancer: Had history of hypertension. Father  at age 67 from \"heart problems\".  Had rheumatoid arthritis and was a smoker.     SOCIAL HISTORY:  Patient denies tobacco, alcohol or illicit drug use.         O:  COMPLETE PHYSICAL EXAM:      /72   Pulse 60   Resp 16   Ht 5' 4\" (1.626 m)   Wt 201 lb 12.8 oz (91.5 kg)   LMP  (LMP Unknown)   BMI 34.64 kg/m²      General:          Well-developed, well-nourished, elderly lady in no acute distress. HEENT:           Normocephalic and atraumatic head. No JVD.  No carotid       TREATMENT PLAN:  1. Reassure. 2.  Continue current medications. 3.  Will schedule pacemaker follow up. 4.  Will check echocardiogram.   5.  Follow up tentatively in 1 year or on a prn basis pending the pacemaker check and the echocardiogram results. [address].  [place] [id number]  [phone]

## 2021-10-14 DIAGNOSIS — I10 HTN (HYPERTENSION), BENIGN: Chronic | ICD-10-CM

## 2021-10-14 RX ORDER — SPIRONOLACTONE 25 MG/1
TABLET ORAL
Qty: 30 TABLET | Refills: 11 | Status: SHIPPED | OUTPATIENT
Start: 2021-10-14

## 2021-10-27 ENCOUNTER — NURSE ONLY (OUTPATIENT)
Dept: CARDIOLOGY CLINIC | Age: 86
End: 2021-10-27

## 2021-11-08 ENCOUNTER — OFFICE VISIT (OUTPATIENT)
Dept: PRIMARY CARE CLINIC | Age: 86
End: 2021-11-08
Payer: COMMERCIAL

## 2021-11-08 VITALS
DIASTOLIC BLOOD PRESSURE: 65 MMHG | RESPIRATION RATE: 17 BRPM | SYSTOLIC BLOOD PRESSURE: 118 MMHG | TEMPERATURE: 97.1 F | HEART RATE: 74 BPM | HEIGHT: 64 IN | OXYGEN SATURATION: 95 % | BODY MASS INDEX: 34.11 KG/M2 | WEIGHT: 199.8 LBS

## 2021-11-08 DIAGNOSIS — J45.20 MILD INTERMITTENT ASTHMA WITHOUT COMPLICATION: ICD-10-CM

## 2021-11-08 DIAGNOSIS — N32.81 OVERACTIVE BLADDER: ICD-10-CM

## 2021-11-08 DIAGNOSIS — M15.9 PRIMARY OSTEOARTHRITIS INVOLVING MULTIPLE JOINTS: ICD-10-CM

## 2021-11-08 DIAGNOSIS — E11.9 DIABETES MELLITUS TYPE 2, DIET-CONTROLLED (HCC): Chronic | ICD-10-CM

## 2021-11-08 DIAGNOSIS — Z76.0 MEDICATION REFILL: ICD-10-CM

## 2021-11-08 DIAGNOSIS — I10 HTN (HYPERTENSION), BENIGN: Primary | Chronic | ICD-10-CM

## 2021-11-08 PROCEDURE — 1090F PRES/ABSN URINE INCON ASSESS: CPT | Performed by: PHYSICIAN ASSISTANT

## 2021-11-08 PROCEDURE — 1123F ACP DISCUSS/DSCN MKR DOCD: CPT | Performed by: PHYSICIAN ASSISTANT

## 2021-11-08 PROCEDURE — G8417 CALC BMI ABV UP PARAM F/U: HCPCS | Performed by: PHYSICIAN ASSISTANT

## 2021-11-08 PROCEDURE — G8484 FLU IMMUNIZE NO ADMIN: HCPCS | Performed by: PHYSICIAN ASSISTANT

## 2021-11-08 RX ORDER — METOPROLOL SUCCINATE 25 MG/1
TABLET, EXTENDED RELEASE ORAL
Qty: 90 TABLET | Refills: 3 | Status: SHIPPED | OUTPATIENT
Start: 2021-11-08

## 2021-11-08 RX ORDER — AMLODIPINE BESYLATE 5 MG/1
TABLET ORAL
Qty: 30 TABLET | Refills: 11 | Status: SHIPPED | OUTPATIENT
Start: 2021-11-08

## 2021-11-08 NOTE — PROGRESS NOTES
11/8/2021    Home visit medically necessary in lieu of an office visit due to: [facility] resident, uses walker, difficult to get out. HPI:  Patient says she is doing about the same. Had a pacer check recently with EKG showing a paced rhythm with a rate of 60. Currently, denies CP or SOB. Her hands are about the same and her feet also bother her. She is using BioFreeze and taking Tylenol. She has improved with her bladder, urine continues leaks out some. She is doing her Kegel exercises regularly. She wears Depends. Her allergies are doing about the same using Flonase. Her nose is no longer draining. She has had no difficulty breathing. She has been walking halls with rollator and has no CASTANO. She has not had any falls in this month. She has been moving her bowels well. She tries to elevate her legs as much as possible to prevent leg swelling; and using compression hose when they help her put them on. REVIEW OF SYSTEMS:    GENERAL: Appetite good. No weight change, generally healthy, no change in strength or exercise tolerance. CARDIOVASCULAR: No chest pains, no murmurs, no palpitations, no syncope, no orthopnea. LEGS HAVE BEEN SWELLING. RESPIRATORY: No shortness of breath, no pain with breathing, no wheezing, no hemoptysis, no cough. BREASTS:  No lumps, discharge or pain. GASTROINTESTINAL: No change in appetite, no dysphagia, no heartburn, no abdominal pains, no diarrhea, no bowel habit changes, no emesis, no melena, no hemorrhoids. CONSTIPATION SOMETIMES. GENITOURINARY: No incontinence or retention, no urinary urgency, no frequent UTIs, no dysuria, no change in nature of urine. STRESS INCONTINENCE AT TIMES. MUSCULOSKELETAL: No swelling or redness of joints, no limitation of range of motion, no weakness or numbness. HAS HAD JT PAINS BUT NOT NOW. NEURO/PSYCH: No weakness, no tremor, no seizures, no changes in mentation, no ataxia. No depressive symptoms, no changes in sleep habits, no changes in thought content. MEMORY PROBLEMS. All other systems negative. Allergies   Allergen Reactions    Flomax [Tamsulosin Hcl] Other (See Comments)     Causes chest pain    Ace Inhibitors Swelling     Causes lip swelling    Flomax [Tamsulosin]     Alendronate Sodium Other (See Comments)     Pt unsure of allergies    Lasix [Furosemide] Other (See Comments)     weakness    Lisinopril Rash    Penicillins Rash     Rash    Propolis Other (See Comments)    Vioxx [Rofecoxib] Other (See Comments)     Past Medical History:   Diagnosis Date    Asthma     Atrioventricular (AV) dissociation     AV block, 1st degree 2/11/2015    Diabetes mellitus ([facility] [address]     GERD (gastroesophageal reflux disease)     Herniated disc     Cervical    Hyperlipidemia     Hypertension     Mild aortic regurgitation 11/5/13    Mitral regurgitation 11/5/13    mild-moderate    Nontoxic uninodular goiter     OA (osteoarthritis of spine)     cervical spine    Obesity      Past Surgical History:   Procedure Laterality Date    A-V CARDIAC PACEMAKER INSERTION Left 06/13/2016    Dual chamber pacemaker [address] by Dr Yuri Montemayor History     Socioeconomic History    Marital status:      Spouse name: Not on file    Number of children: None    Years of education: Not on file    Highest education level: Not on file   Occupational History    Not on file   Tobacco Use    Smoking status: Never Smoker    Smokeless tobacco: Never Used   Substance and Sexual Activity    Alcohol use: No     Frequency: Never     Binge frequency: Never     Comment:      Drug use: No    Sexual activity: Not Currently   Social History Narrative    1 cup tea daily; occ pop; no coffee      Vaccine Date   COVID-19 12/23/20, 1/13/21   Influenza 10/12/21   Prevnar 13 12/29/2015   Pneumovax 23 1/12/2017     No family history on file.     PHYSICAL EXAM:   Vitals:    11/08/21 1006   BP: 118/65   Pulse: 74   Resp: 17   Temp: 97.1 °F (36.2 °C) TempSrc: Temporal   SpO2: 95%   Weight: 199 lb 12.8 oz (90.6 kg)   Height: 5' 4\" (1.626 m)     Estimated body mass index is 34.3 kg/m² as calculated from the following:    Height as of this encounter: 5' 4\" (1.626 m). Weight as of this encounter: 199 lb 12.8 oz (90.6 kg). GEN:  elderly WDWN female patient seated in recliner in NAD. HEAD:  atraumatic, normocephalic. EYES:  EOMI, PERRL, no cataracts, conjunctivae appear normal.  ENT:   Good hearing, EACs without wax, TM's normal, nasal septum midline, no significant congestion, oral cavity without lesions, poor-fair dentition, no dentures. NECK:  fair-good ROM, no palpable masses, no carotid bruits, no JVD. LUNGS:  clear to ausc, no rales, rhonchi or wheezes. HEART:  RRR, no murmurs or gallops. ABD:  soft, non-tender to palp, no palp masses or HSM, normal BS. BACK:  no scoliosis or kyphosis, non-tender to palp. EXTREMITIES: Trace-no edema, no ulcerations, varicosities or erythema. No gross deformities. MUSCULOSKELETAL: Good ROM of all joints, non tender to palp and or with movement. SKIN:  No ulcerations or breakdown, rash, ecchymosis, or other lesions. NEURO:  No tremor, motor UEs 5/5 LEs  5/5, sensory normal, able to stand and walk slowly using rollator with mild ataxia. PSYCH:  Pleasant and cooperative. Fluid speech, oriented to person, place and time. No delusional statements. Medications reviewed. Labs 9/7/21 GFR 63, Na 128, A1c 6.2  5/20/21 HH 11.9/36.4, GFR 47, Na 128, Cl 95, LFT nl, A1c 6.1, TSH 1.151, , Tri 76, HDL 47, LDL 57   2/22/21 Na 130, GFR 52   11/20/20 GFR 52, Na 128, lytes o/w nl    ASSESSMENT:  Elderly female has HTN (hypertension), benign; Diabetes mellitus type 2, diet-controlled ([address] DDD (degenerative disc disease), cervical; Non-rheumatic mitral regurgitation; AI (aortic insufficiency); Pacemaker; Hyperlipidemia LDL goal <100; Acquired hypothyroidism; Syncope and collapse;  Hyponatremia; Primary osteoarthritis involving multiple joints; Mild intermittent asthma without complication; History of complete heart block; Depression; Difficulty walking; Chronic fatigue; Overactive bladder; Constipation; and Urinary incontinence on their problem list.     Donnie was seen today for hypertension. Diagnoses and all orders for this visit:    HTN (hypertension), benign    Diabetes mellitus type 2, diet-controlled ([facility] [address]    Mild intermittent asthma without complication    Primary osteoarthritis involving multiple joints    Overactive bladder          PLAN:    Continue to use BioFreeze on painful hand joints. Continue Tylenol for pain as needed. Discussed diet and activity level. Continue Flonase for rhinitis. Discussed Kegel exercises 45/day. Encouraged to drink plenty of fluids. Check BMP & A1c every 3 months, next in December. Continue other meds as per Rx List. Recheck 1 month. 40 minutes spent on visit, 25 minutes involved education/counseling regarding HTN, urinary, and DJD disease processes, treatment options, meds and coordination of care. Current Outpatient Medications   Medication Sig Dispense Refill    amLODIPine (NORVASC) 5 MG tablet TAKE 1 TABLET BY MOUTH AT  BEDTIME. 30 tablet 11    metoprolol succinate (TOPROL XL) 25 MG extended release tablet TAKE 1 TABLET BY MOUTH AT  BEDTIME. 90 tablet 3    spironolactone (ALDACTONE) 25 MG tablet TAKE 1 TABLET BY MOUTH DAILY 30 tablet 11    loratadine (CLARITIN) 10 MG tablet TAKE 1 TABLET BY MOUTH ONCE A DAY 90 tablet 3    polyethylene glycol (GLYCOLAX) 17 GM/SCOOP powder Take 17 g by mouth daily as needed (constipation) 944 g 5    folic acid (FOLVITE) 577 MCG tablet TAKE 1 TABLET BY MOUTH IN  THE MORNING.  30 tablet 11    metoprolol succinate (TOPROL XL) 50 MG extended release tablet TAKE 1 TABLET BY MOUTH DAILY 30 tablet 7    ipratropium (ATROVENT) 0.06 % nasal spray 2 sprays by Each Nostril route 4 times daily as needed for Rhinitis 1 Bottle 5    acetaminophen (ACETAMINOPHEN EXTRA STRENGTH) 500 MG tablet TAKE 1 TABLET BY MOUTH FOUR TIMES DAILY AS NEEDED FOR PAIN. 120 tablet 11    aspirin (ASPIR-LOW) 81 MG EC tablet TAKE 1 TABLET BY MOUTH ONCE DAILY 30 tablet 11    Incontinence Supply Disposable MISC 2 each by Does not apply route as needed (Incontinence) Use prn. Dispense 2 cases. 11 Refills. Size large 2 each 11    vitamin D (CHOLECALCIFEROL) 50 MCG (2000 UT) TABS tablet Take 1 tablet by mouth daily      fluticasone (FLONASE) 50 MCG/ACT nasal spray 2 sprays by Each Nostril route daily      Lidocaine HCl (L-E) gel Apply topically once      simvastatin (ZOCOR) 10 MG tablet TAKE 1 TABLET BY MOUTH AT  BEDTIME. 30 tablet 11    calcium carbonate-vitamin D 600-200 MG-UNIT TABS Take 1 tablet by mouth 2 times daily 60 tablet 11    levothyroxine (SYNTHROID) 100 MCG tablet Take 1 tablet by mouth Daily TAKE 1 TABLET BY MOUTH IN THE MORNING 30 MINUTES BEFORE BREAKFAST OR OTHER MEDICATIONS. 90 tablet 3    Omega-3 Fatty Acids (FISH OIL) 1200 MG CAPS TAKE 1 CAPSULE BY MOUTH IN THE MORNING. 30 capsule 11     No current facility-administered medications for this visit. Return in about 1 month (around 12/8/2021) for regular visit. An electronic signature was used to authenticate this note.     --Farrukh Lewis PA-C on 11/8/2021 at 10:16 AM

## 2021-11-22 ENCOUNTER — HOSPITAL ENCOUNTER (OUTPATIENT)
Dept: CARDIOLOGY | Age: 86
Discharge: HOME | End: 2021-11-22
Payer: COMMERCIAL

## 2021-11-22 DIAGNOSIS — I38 VHD (VALVULAR HEART DISEASE): ICD-10-CM

## 2021-11-22 LAB
LV EF: 70 %
LVEF MODALITY: NORMAL

## 2021-11-22 PROCEDURE — 93306 TTE W/DOPPLER COMPLETE: CPT

## 2021-12-08 ENCOUNTER — OFFICE VISIT (OUTPATIENT)
Dept: PRIMARY CARE CLINIC | Age: 86
End: 2021-12-08
Payer: COMMERCIAL

## 2021-12-08 VITALS
SYSTOLIC BLOOD PRESSURE: 125 MMHG | TEMPERATURE: 97.1 F | BODY MASS INDEX: 34.11 KG/M2 | OXYGEN SATURATION: 95 % | HEIGHT: 64 IN | WEIGHT: 199.8 LBS | DIASTOLIC BLOOD PRESSURE: 72 MMHG | HEART RATE: 66 BPM | RESPIRATION RATE: 16 BRPM

## 2021-12-08 DIAGNOSIS — I10 HTN (HYPERTENSION), BENIGN: Primary | Chronic | ICD-10-CM

## 2021-12-08 DIAGNOSIS — R07.9 CHEST PAIN, UNSPECIFIED TYPE: ICD-10-CM

## 2021-12-08 DIAGNOSIS — E11.9 DIABETES MELLITUS TYPE 2, DIET-CONTROLLED (HCC): Chronic | ICD-10-CM

## 2021-12-08 DIAGNOSIS — J45.20 MILD INTERMITTENT ASTHMA WITHOUT COMPLICATION: ICD-10-CM

## 2021-12-08 DIAGNOSIS — N32.81 OVERACTIVE BLADDER: ICD-10-CM

## 2021-12-08 DIAGNOSIS — Z95.0 PACEMAKER: Chronic | ICD-10-CM

## 2021-12-08 DIAGNOSIS — M15.9 PRIMARY OSTEOARTHRITIS INVOLVING MULTIPLE JOINTS: ICD-10-CM

## 2021-12-08 PROCEDURE — 1090F PRES/ABSN URINE INCON ASSESS: CPT | Performed by: PHYSICIAN ASSISTANT

## 2021-12-08 PROCEDURE — G8417 CALC BMI ABV UP PARAM F/U: HCPCS | Performed by: PHYSICIAN ASSISTANT

## 2021-12-08 PROCEDURE — 1123F ACP DISCUSS/DSCN MKR DOCD: CPT | Performed by: PHYSICIAN ASSISTANT

## 2021-12-08 PROCEDURE — G8484 FLU IMMUNIZE NO ADMIN: HCPCS | Performed by: PHYSICIAN ASSISTANT

## 2021-12-08 NOTE — PROGRESS NOTES
12/8/2021    Home visit medically necessary in lieu of an office visit due to: [facility] resident, uses walker, difficult to get out. HPI:    Patient had an echocardiogram this past month that showed EF 70+/-5%. She says since the procedure every so often she will have some chest pain in the left upper chest. Denies weakness, numbness, SOB,or radiation of the pain. Says pain can be worse with movement and breathing. Arthritic hand and feet pain is about the same. She is using BioFreeze and taking Tylenol. She has improved with her bladder, urine continues leaks out some. In the past she has done Kegel exercises. She wears Depends. Her allergies are doing about the same using Flonase. Her nose is no longer draining. She has been walking halls with rollator and has no CASTANO. She has not had any falls in this month. She has been moving her bowels well. She tries to elevate her legs as much as possible to prevent leg swelling; and using compression hose when they help her put them on. REVIEW OF SYSTEMS:    GENERAL: Appetite good. No weight change, generally healthy, no change in strength or exercise tolerance. CARDIOVASCULAR: No chest pains, no murmurs, no palpitations, no syncope, no orthopnea. LEGS HAVE BEEN SWELLING. RESPIRATORY: No shortness of breath, no pain with breathing, no wheezing, no hemoptysis, no cough. BREASTS:  No lumps, discharge or pain. GASTROINTESTINAL: No change in appetite, no dysphagia, no heartburn, no abdominal pains, no diarrhea, no bowel habit changes, no emesis, no melena, no hemorrhoids. CONSTIPATION SOMETIMES. GENITOURINARY: No incontinence or retention, no urinary urgency, no frequent UTIs, no dysuria, no change in nature of urine. STRESS INCONTINENCE AT TIMES. MUSCULOSKELETAL: No swelling or redness of joints, no limitation of range of motion, no weakness or numbness. HAS HAD JT PAINS BUT NOT NOW. NEURO/PSYCH: No weakness, no tremor, no seizures, no changes in mentation, no ataxia.  No depressive symptoms, no changes in sleep habits, no changes in thought content. MEMORY PROBLEMS. All other systems negative. Allergies   Allergen Reactions    Flomax [Tamsulosin Hcl] Other (See Comments)     Causes chest pain    Ace Inhibitors Swelling     Causes lip swelling    Flomax [Tamsulosin]     Alendronate Sodium Other (See Comments)     Pt unsure of allergies    Lasix [Furosemide] Other (See Comments)     weakness    Lisinopril Rash    Penicillins Rash     Rash    Propolis Other (See Comments)    Vioxx [Rofecoxib] Other (See Comments)     Past Medical History:   Diagnosis Date    Asthma     Atrioventricular (AV) dissociation     AV block, 1st degree 2/11/2015    Diabetes mellitus ([facility] [address]     GERD (gastroesophageal reflux disease)     Herniated disc     Cervical    Hyperlipidemia     Hypertension     Mild aortic regurgitation 11/5/13    Mitral regurgitation 11/5/13    mild-moderate    Nontoxic uninodular goiter     OA (osteoarthritis of spine)     cervical spine    Obesity      Past Surgical History:   Procedure Laterality Date    A-V CARDIAC PACEMAKER INSERTION Left 06/13/2016    Dual chamber pacemaker [address] by Dr Nery Roman History     Socioeconomic History    Marital status:      Spouse name: Not on file    Number of children: None    Years of education: Not on file    Highest education level: Not on file   Occupational History    Not on file   Tobacco Use    Smoking status: Never Smoker    Smokeless tobacco: Never Used   Substance and Sexual Activity    Alcohol use: No     Frequency: Never     Binge frequency: Never     Comment:      Drug use: No    Sexual activity: Not Currently   Social History Narrative    1 cup tea daily; occ pop; no coffee      Vaccine Date   COVID-19 12/23/20, 1/13/21   Influenza 10/12/21   Prevnar 13 12/29/2015   Pneumovax 23 1/12/2017     No family history on file.     PHYSICAL EXAM: Vitals:    12/08/21 1005   BP: 125/72   Pulse: 66   Resp: 16   Temp: 97.1 °F (36.2 °C)   TempSrc: Temporal   SpO2: 95%   Weight: 199 lb 12.8 oz (90.6 kg)   Height: 5' 4\" (1.626 m)     Estimated body mass index is 34.3 kg/m² as calculated from the following:    Height as of this encounter: 5' 4\" (1.626 m). Weight as of this encounter: 199 lb 12.8 oz (90.6 kg). GEN:  elderly WDWN female patient seated in recliner in NAD. HEAD:  atraumatic, normocephalic. EYES:  EOMI, PERRL, no cataracts, conjunctivae appear normal.  ENT:   Good hearing, EACs without wax, TM's normal, nasal septum midline, no significant congestion, oral cavity without lesions, poor-fair dentition, no dentures. NECK:  fair-good ROM, no palpable masses, no carotid bruits, no JVD. LUNGS:  clear to ausc, no rales, rhonchi or wheezes. HEART:  RRR, no murmurs or gallops. ABD:  soft, non-tender to palp, no palp masses or HSM, normal BS. BACK:  no scoliosis or kyphosis, non-tender to palp. EXTREMITIES: Trace-no edema, no ulcerations, varicosities or erythema. No gross deformities. MUSCULOSKELETAL: Good ROM of all joints, non tender to palp and or with movement. SKIN:  No ulcerations or breakdown, rash, ecchymosis, or other lesions. NEURO:  No tremor, motor UEs 5/5 LEs  5/5, sensory normal, able to stand and walk slowly using rollator with mild ataxia. PSYCH:  Pleasant and cooperative. Fluid speech, oriented to person, place and time. No delusional statements. Medications reviewed. Labs 9/7/21 GFR 63, Na 128, A1c 6.2  5/20/21 HH 11.9/36.4, GFR 47, Na 128, Cl 95, LFT nl, A1c 6.1, TSH 1.151, , Tri 76, HDL 47, LDL 57   2/22/21 Na 130, GFR 52   11/20/20 GFR 52, Na 128, lytes o/w nl    ASSESSMENT:  Elderly female has HTN (hypertension), benign; Diabetes mellitus type 2, diet-controlled ([facility] [address] DDD (degenerative disc disease), cervical; Non-rheumatic mitral regurgitation; AI (aortic insufficiency); Pacemaker;  Hyperlipidemia LDL goal <100; Acquired hypothyroidism; Syncope and collapse; Hyponatremia; Primary osteoarthritis involving multiple joints; Mild intermittent asthma without complication; History of complete heart block; Depression; Difficulty walking; Chronic fatigue; Overactive bladder; Constipation; and Urinary incontinence on their problem list.     Donnie was seen today for hypertension and diabetes. Diagnoses and all orders for this visit:    HTN (hypertension), benign  -     BASIC METABOLIC PANEL; Future    Diabetes mellitus type 2, diet-controlled ([facility] [address]  -     BASIC METABOLIC PANEL; Future  -     HEMOGLOBIN A1C; Future    Mild intermittent asthma without complication    Primary osteoarthritis involving multiple joints    Overactive bladder    Pacemaker  -     EKG 12 lead; Future    Chest pain, unspecified type  -     EKG 12 lead; Future          PLAN:    Order EKG due to complaint of CP which sounds more like chest wall discomfort but she has a cardiac history. Continue to use BioFreeze on painful hand joints. Continue Tylenol for pain as needed. Discussed diet and activity level. Continue Flonase for rhinitis. Discussed Kegel exercises 45/day. Encouraged to drink plenty of fluids. Check BMP & A1c, then every 3 months, next in March. Continue other meds as per Rx List. Recheck 1 month. 40 minutes spent on visit, 25 minutes involved education/counseling regarding HTN, urinary, and DJD disease processes, treatment options, meds and coordination of care. Current Outpatient Medications   Medication Sig Dispense Refill    amLODIPine (NORVASC) 5 MG tablet TAKE 1 TABLET BY MOUTH AT  BEDTIME. 30 tablet 11    metoprolol succinate (TOPROL XL) 25 MG extended release tablet TAKE 1 TABLET BY MOUTH AT  BEDTIME.  90 tablet 3    spironolactone (ALDACTONE) 25 MG tablet TAKE 1 TABLET BY MOUTH DAILY 30 tablet 11    loratadine (CLARITIN) 10 MG tablet TAKE 1 TABLET BY MOUTH ONCE A DAY 90 tablet 3    polyethylene glycol (GLYCOLAX) 17 GM/SCOOP powder Take 17 g by mouth daily as needed (constipation) 980 g 5    folic acid (FOLVITE) 324 MCG tablet TAKE 1 TABLET BY MOUTH IN  THE MORNING. 30 tablet 11    metoprolol succinate (TOPROL XL) 50 MG extended release tablet TAKE 1 TABLET BY MOUTH DAILY 30 tablet 7    ipratropium (ATROVENT) 0.06 % nasal spray 2 sprays by Each Nostril route 4 times daily as needed for Rhinitis 1 Bottle 5    acetaminophen (ACETAMINOPHEN EXTRA STRENGTH) 500 MG tablet TAKE 1 TABLET BY MOUTH FOUR TIMES DAILY AS NEEDED FOR PAIN. 120 tablet 11    aspirin (ASPIR-LOW) 81 MG EC tablet TAKE 1 TABLET BY MOUTH ONCE DAILY 30 tablet 11    Incontinence Supply Disposable MISC 2 each by Does not apply route as needed (Incontinence) Use prn. Dispense 2 cases. 11 Refills. Size large 2 each 11    vitamin D (CHOLECALCIFEROL) 50 MCG (2000 UT) TABS tablet Take 1 tablet by mouth daily      fluticasone (FLONASE) 50 MCG/ACT nasal spray 2 sprays by Each Nostril route daily      Lidocaine HCl (L-E) gel Apply topically once      simvastatin (ZOCOR) 10 MG tablet TAKE 1 TABLET BY MOUTH AT  BEDTIME. 30 tablet 11    calcium carbonate-vitamin D 600-200 MG-UNIT TABS Take 1 tablet by mouth 2 times daily 60 tablet 11    levothyroxine (SYNTHROID) 100 MCG tablet Take 1 tablet by mouth Daily TAKE 1 TABLET BY MOUTH IN THE MORNING 30 MINUTES BEFORE BREAKFAST OR OTHER MEDICATIONS. 90 tablet 3    Omega-3 Fatty Acids (FISH OIL) 1200 MG CAPS TAKE 1 CAPSULE BY MOUTH IN THE MORNING. 30 capsule 11     No current facility-administered medications for this visit. Return in about 1 month (around 1/8/2022) for regular visit. An electronic signature was used to authenticate this note.     --Ivette Zamora PA-C on 12/8/2021 at 11:01 AM

## 2021-12-09 LAB
BUN BLDV-MCNC: 29 MG/DL
CALCIUM SERPL-MCNC: 9 MG/DL
CHLORIDE BLD-SCNC: 96 MMOL/L
CO2: 18 MMOL/L
CREAT SERPL-MCNC: 1.2 MG/DL
GFR CALCULATED: 51
GLUCOSE BLD-MCNC: 66 MG/DL
POTASSIUM SERPL-SCNC: 5.1 MMOL/L
SODIUM BLD-SCNC: 130 MMOL/L

## 2021-12-10 DIAGNOSIS — E11.9 DIABETES MELLITUS TYPE 2, DIET-CONTROLLED (HCC): Chronic | ICD-10-CM

## 2021-12-10 DIAGNOSIS — I10 HTN (HYPERTENSION), BENIGN: Chronic | ICD-10-CM

## 2021-12-15 LAB
AVERAGE GLUCOSE: 134
HBA1C MFR BLD: 6.3 %

## 2021-12-22 DIAGNOSIS — E11.9 DIABETES MELLITUS TYPE 2, DIET-CONTROLLED (HCC): Chronic | ICD-10-CM

## 2022-01-07 ENCOUNTER — TELEPHONE (OUTPATIENT)
Dept: PRIMARY CARE CLINIC | Age: 87
End: 2022-01-07

## 2022-01-07 NOTE — TELEPHONE ENCOUNTER
Ajay Jc called. She states she took Georgie out yesterday for an appt. It is the first time she has seen her since November. She states the Claudine seemed to be \"full of fluid\". States she had a hard time getting her gloves and coat on. She did not notice any SOB or other symptoms. She is aware that you are scheduled to see her next week. She just wanted you to be aware so that you could evaluate this at the visit.

## 2022-01-12 ENCOUNTER — TELEPHONE (OUTPATIENT)
Dept: PRIMARY CARE CLINIC | Age: 87
End: 2022-01-12

## 2022-01-12 NOTE — TELEPHONE ENCOUNTER
Spoke to [place] (niece) to confirm pt's appt, [place] picked up pt in December for an appointment and says pt looked like she was retaining fluid. She states her face and hands were tight and swollen, also clothes are tighter and her weight has gone up. According to [place] pt is at her heaviest. [place] does acknowledge that Hettie isnt moving as much. She is most concerned with fluid build-up and the pacemaker. She has not expressed this to the nurses at [facility] and I recommended she do so. I also let her know that the nurses are sending us pt's weights for Dr. Barry Reed to monitor. She would like a follow up call after visit as well.

## 2022-01-14 ENCOUNTER — OFFICE VISIT (OUTPATIENT)
Dept: PRIMARY CARE CLINIC | Age: 87
End: 2022-01-14
Payer: COMMERCIAL

## 2022-01-14 VITALS
WEIGHT: 200 LBS | HEIGHT: 64 IN | BODY MASS INDEX: 34.15 KG/M2 | HEART RATE: 66 BPM | OXYGEN SATURATION: 98 % | RESPIRATION RATE: 16 BRPM | TEMPERATURE: 97.4 F | DIASTOLIC BLOOD PRESSURE: 64 MMHG | SYSTOLIC BLOOD PRESSURE: 122 MMHG

## 2022-01-14 DIAGNOSIS — K59.00 CONSTIPATION, UNSPECIFIED CONSTIPATION TYPE: ICD-10-CM

## 2022-01-14 DIAGNOSIS — N32.81 OVERACTIVE BLADDER: ICD-10-CM

## 2022-01-14 DIAGNOSIS — I10 HTN (HYPERTENSION), BENIGN: Primary | Chronic | ICD-10-CM

## 2022-01-14 DIAGNOSIS — E87.1 HYPONATREMIA: ICD-10-CM

## 2022-01-14 DIAGNOSIS — E11.9 DIABETES MELLITUS TYPE 2, DIET-CONTROLLED (HCC): Chronic | ICD-10-CM

## 2022-01-14 DIAGNOSIS — M15.9 PRIMARY OSTEOARTHRITIS INVOLVING MULTIPLE JOINTS: ICD-10-CM

## 2022-01-14 PROCEDURE — G8484 FLU IMMUNIZE NO ADMIN: HCPCS | Performed by: FAMILY MEDICINE

## 2022-01-14 PROCEDURE — G8417 CALC BMI ABV UP PARAM F/U: HCPCS | Performed by: FAMILY MEDICINE

## 2022-01-14 PROCEDURE — 1090F PRES/ABSN URINE INCON ASSESS: CPT | Performed by: FAMILY MEDICINE

## 2022-01-14 PROCEDURE — 1123F ACP DISCUSS/DSCN MKR DOCD: CPT | Performed by: FAMILY MEDICINE

## 2022-01-14 NOTE — PROGRESS NOTES
1/14/2022    Home visit medically necessary in lieu of an office visit due to: [facility] resident, uses walker, difficult to get out. HPI:  Patient says her niece is concerned that she is swelling up but she feels the same. She says her legs have always been big. She tries to elevate her legs as much as possible to prevent leg swelling; and using compression hose when they help her put them on. She has gained quite a bit of weight since living at [facility]. Weight has been between 198-204 in last year. She eats 3 meals/day and doesn't exercise much. She continues to have occasional chest pain in the left upper chest. Pain can be worse with movement and breathing. Arthritic hand and feet pain is about the same. She uses BioFreeze and takes Tylenol. Overactive bladder has improved with urine still leaking out some. She does Kegel exercises sometimes and wears Depends. She has not had any falls in this month. She has been moving her bowels well. REVIEW OF SYSTEMS:    GENERAL: Appetite good. No weight change, generally healthy, no change in strength or exercise tolerance. CARDIOVASCULAR: No chest pains, no murmurs, no palpitations, no syncope, no orthopnea. LEGS HAVE BEEN SWELLING. RESPIRATORY: No shortness of breath, no pain with breathing, no wheezing, no hemoptysis, no cough. BREASTS:  No lumps, discharge or pain. GASTROINTESTINAL: No change in appetite, no dysphagia, no heartburn, no abdominal pains, no diarrhea, no bowel habit changes, no emesis, no melena, no hemorrhoids. CONSTIPATION SOMETIMES. GENITOURINARY: No incontinence or retention, no urinary urgency, no frequent UTIs, no dysuria, no change in nature of urine. STRESS INCONTINENCE AT TIMES. MUSCULOSKELETAL: No swelling or redness of joints, no limitation of range of motion, no weakness or numbness. HAS HAD JT PAINS BUT NOT NOW. NEURO/PSYCH: No weakness, no tremor, no seizures, no changes in mentation, no ataxia.  No depressive symptoms, no changes in sleep habits, no changes in thought content. MEMORY PROBLEMS. All other systems negative. Allergies   Allergen Reactions    Flomax [Tamsulosin Hcl] Other (See Comments)     Causes chest pain    Ace Inhibitors Swelling     Causes lip swelling    Flomax [Tamsulosin]     Alendronate Sodium Other (See Comments)     Pt unsure of allergies    Lasix [Furosemide] Other (See Comments)     weakness    Lisinopril Rash    Penicillins Rash     Rash    Propolis Other (See Comments)    Vioxx [Rofecoxib] Other (See Comments)     Past Medical History:   Diagnosis Date    Asthma     Atrioventricular (AV) dissociation     AV block, 1st degree 2/11/2015    Diabetes mellitus ([facility] [address]     GERD (gastroesophageal reflux disease)     Herniated disc     Cervical    Hyperlipidemia     Hypertension     Mild aortic regurgitation 11/5/13    Mitral regurgitation 11/5/13    mild-moderate    Nontoxic uninodular goiter     OA (osteoarthritis of spine)     cervical spine    Obesity      Past Surgical History:   Procedure Laterality Date    A-V CARDIAC PACEMAKER INSERTION Left 06/13/2016    Dual chamber pacemaker [address] by Dr Ayala Party History     Socioeconomic History    Marital status:      Spouse name: Not on file    Number of children: None    Years of education: Not on file    Highest education level: Not on file   Occupational History    Not on file   Tobacco Use    Smoking status: Never Smoker    Smokeless tobacco: Never Used   Substance and Sexual Activity    Alcohol use: No     Frequency: Never     Binge frequency: Never     Comment:      Drug use: No    Sexual activity: Not Currently   Social History Narrative    1 cup tea daily; occ pop; no coffee      Vaccine Date   COVID-19 12/23/20, 1/13/21   Influenza 10/12/21   Prevnar 13 12/29/2015   Pneumovax 23 1/12/2017     No family history on file.     PHYSICAL EXAM:   Vitals:    01/14/22 0937   BP: 122/64   Site: Right Upper Arm   Position: Sitting   Pulse: 66   Resp: 16   Temp: 97.4 °F (36.3 °C)   TempSrc: Infrared   SpO2: 98%   Weight: 200 lb (90.7 kg)   Height: 5' 4\" (1.626 m)     Estimated body mass index is 34.33 kg/m² as calculated from the following:    Height as of this encounter: 5' 4\" (1.626 m). Weight as of this encounter: 200 lb (90.7 kg). GEN:  elderly WDWN female patient seated in recliner in NAD. HEAD:  atraumatic, normocephalic. EYES:  EOMI, PERRL, no cataracts, conjunctivae appear normal.  ENT:   Good hearing, EACs without wax, TM's normal, nasal septum midline, no significant congestion, oral cavity without lesions, poor-fair dentition, no dentures. NECK:  fair-good ROM, no palpable masses, no carotid bruits, no JVD. LUNGS:  clear to ausc, no rales, rhonchi or wheezes. HEART:  RRR, no murmurs or gallops. ABD:  soft, non-tender to palp, no palp masses or HSM, normal BS. BACK:  no scoliosis or kyphosis, non-tender to palp. EXTREMITIES: Trace-no edema, no ulcerations, varicosities or erythema. No gross deformities. MUSCULOSKELETAL: Good ROM of all joints, non tender to palp and or with movement. SKIN:  No ulcerations or breakdown, rash, ecchymosis, or other lesions. NEURO:  No tremor, motor UEs 5/5 LEs  5/5, sensory normal, able to stand and walk slowly using rollator with mild ataxia. PSYCH:  Pleasant and cooperative. Fluid speech, oriented to person, place and time. No delusional statements. Medications reviewed. Labs 12/9/21 Na 130, GFR 51, A1c 6.3  9/7/21 GFR 63, Na 128, A1c 6.2  5/20/21 HH 11.9/36.4, GFR 47, Na 128, Cl 95, LFT nl, A1c 6.1, TSH 1.151, , Tri 76, HDL 47, LDL 57   2/22/21 Na 130, GFR 52      ASSESSMENT:  Elderly female has HTN (hypertension), benign; Diabetes mellitus type 2, diet-controlled ([address] DDD (degenerative disc disease), cervical; Non-rheumatic mitral regurgitation; AI (aortic insufficiency); Pacemaker;  Hyperlipidemia LDL goal <100; Acquired hypothyroidism; Syncope and collapse; Hyponatremia; Primary osteoarthritis involving multiple joints; Mild intermittent asthma without complication; History of complete heart block; Depression; Difficulty walking; Chronic fatigue; Overactive bladder; Constipation; and Urinary incontinence on their problem list.     Donnie was seen today for hypertension, weight gain and fatigue. Diagnoses and all orders for this visit:    HTN (hypertension), benign    Diabetes mellitus type 2, diet-controlled ([facility] [address]    Primary osteoarthritis involving multiple joints    Overactive bladder    Constipation, unspecified constipation type    Hyponatremia        PLAN:    Spoke with juvencio Hart on phone. Encouraged to elevate legs more. Discussed diet and activity level. Smaller portions at meals - skip breakfast and snacks. Instructed to drink less water. Continue to use BioFreeze on painful hand joints. Continue Tylenol for pain as needed. Encouraged to drink plenty of fluids. Check BMP & A1c, then every 3 months, next in March. Continue other meds as per Rx List. Recheck 1 month. 60 minutes spent on visit, 35 minutes involved education/counseling regarding HTN, DM, weight, urinary, and DJD disease processes, treatment options, meds and coordination of care. Current Outpatient Medications   Medication Sig Dispense Refill    amLODIPine (NORVASC) 5 MG tablet TAKE 1 TABLET BY MOUTH AT  BEDTIME. 30 tablet 11    metoprolol succinate (TOPROL XL) 25 MG extended release tablet TAKE 1 TABLET BY MOUTH AT  BEDTIME. 90 tablet 3    spironolactone (ALDACTONE) 25 MG tablet TAKE 1 TABLET BY MOUTH DAILY 30 tablet 11    loratadine (CLARITIN) 10 MG tablet TAKE 1 TABLET BY MOUTH ONCE A DAY 90 tablet 3    polyethylene glycol (GLYCOLAX) 17 GM/SCOOP powder Take 17 g by mouth daily as needed (constipation) 119 g 5    folic acid (FOLVITE) 065 MCG tablet TAKE 1 TABLET BY MOUTH IN  THE MORNING.  30 tablet 11    metoprolol succinate (TOPROL XL) 50 MG extended release tablet TAKE 1 TABLET BY MOUTH DAILY 30 tablet 7    ipratropium (ATROVENT) 0.06 % nasal spray 2 sprays by Each Nostril route 4 times daily as needed for Rhinitis 1 Bottle 5    acetaminophen (ACETAMINOPHEN EXTRA STRENGTH) 500 MG tablet TAKE 1 TABLET BY MOUTH FOUR TIMES DAILY AS NEEDED FOR PAIN. 120 tablet 11    aspirin (ASPIR-LOW) 81 MG EC tablet TAKE 1 TABLET BY MOUTH ONCE DAILY 30 tablet 11    Incontinence Supply Disposable MISC 2 each by Does not apply route as needed (Incontinence) Use prn. Dispense 2 cases. 11 Refills. Size large 2 each 11    vitamin D (CHOLECALCIFEROL) 50 MCG (2000 UT) TABS tablet Take 1 tablet by mouth daily      fluticasone (FLONASE) 50 MCG/ACT nasal spray 2 sprays by Each Nostril route daily      Lidocaine HCl (L-E) gel Apply topically once      simvastatin (ZOCOR) 10 MG tablet TAKE 1 TABLET BY MOUTH AT  BEDTIME. 30 tablet 11    calcium carbonate-vitamin D 600-200 MG-UNIT TABS Take 1 tablet by mouth 2 times daily 60 tablet 11    levothyroxine (SYNTHROID) 100 MCG tablet Take 1 tablet by mouth Daily TAKE 1 TABLET BY MOUTH IN THE MORNING 30 MINUTES BEFORE BREAKFAST OR OTHER MEDICATIONS. 90 tablet 3    Omega-3 Fatty Acids (FISH OIL) 1200 MG CAPS TAKE 1 CAPSULE BY MOUTH IN THE MORNING. 30 capsule 11     No current facility-administered medications for this visit. Return in about 1 month (around 2/14/2022). An electronic signature was used to authenticate this note.     --Nickie Joseph MD on 1/14/2022 at 10:54 AM

## 2022-02-07 NOTE — TELEPHONE ENCOUNTER
KEYLAW called PCP office, spoke with April. April will send refill request of chlorthalidone to PCP. 07-Feb-2022 05:40

## 2022-02-10 NOTE — PROGRESS NOTES
2/11/2022    Home visit medically necessary in lieu of an office visit due to: assisted resident, uses walker, difficult to get out. HPI:  Patient says she is doing pretty good. Her legs have not been swelling. She elevates her legs as much as possible when she is in her room. She has been watching her diet more and trying to walk more. Her hands and feet DJD pain is about the same. She continues to use BioFreeze and takes Tylenol for relief. She has an occasional chest pain in the left upper chest but not often. Pain is worse with movement and breathing. Her overactive bladder is better than it used to be. She has some urine still leaking out at times. She does not do her Kegel exercises consistently. She wears Depends. She has not had any falls in this month. She has been moving her bowels well. REVIEW OF SYSTEMS:    GENERAL: Appetite good. No weight change, generally healthy, no change in strength or exercise tolerance. CARDIOVASCULAR: No chest pains, no murmurs, no palpitations, no syncope, no orthopnea. LEGS HAVE BEEN SWELLING. RESPIRATORY: No shortness of breath, no pain with breathing, no wheezing, no hemoptysis, no cough. BREASTS:  No lumps, discharge or pain. GASTROINTESTINAL: No change in appetite, no dysphagia, no heartburn, no abdominal pains, no diarrhea, no bowel habit changes, no emesis, no melena, no hemorrhoids. CONSTIPATION SOMETIMES. GENITOURINARY: No incontinence or retention, no urinary urgency, no frequent UTIs, no dysuria, no change in nature of urine. STRESS INCONTINENCE AT TIMES. MUSCULOSKELETAL: No swelling or redness of joints, no limitation of range of motion, no weakness or numbness. HAS HAD JT PAINS BUT NOT NOW. NEURO/PSYCH: No weakness, no tremor, no seizures, no changes in mentation, no ataxia. No depressive symptoms, no changes in sleep habits, no changes in thought content. MEMORY PROBLEMS. All other systems negative.     Allergies   Allergen Reactions    Flomax [Tamsulosin Hcl] Other (See Comments)     Causes chest pain    Ace Inhibitors Swelling     Causes lip swelling    Flomax [Tamsulosin]     Alendronate Sodium Other (See Comments)     Pt unsure of allergies    Lasix [Furosemide] Other (See Comments)     weakness    Lisinopril Rash    Penicillins Rash     Rash    Propolis Other (See Comments)    Vioxx [Rofecoxib] Other (See Comments)     Past Medical History:   Diagnosis Date    Asthma     Atrioventricular (AV) dissociation     AV block, 1st degree 2/11/2015    Diabetes mellitus ([address]     GERD (gastroesophageal reflux disease)     Herniated disc     Cervical    Hyperlipidemia     Hypertension     Mild aortic regurgitation 11/5/13    Mitral regurgitation 11/5/13    mild-moderate    Nontoxic uninodular goiter     OA (osteoarthritis of spine)     cervical spine    Obesity      Past Surgical History:   Procedure Laterality Date    A-V CARDIAC PACEMAKER INSERTION Left 06/13/2016    Dual chamber pacemaker [address] by Dr Constantin Roberts History     Socioeconomic History    Marital status:      Spouse name: Not on file    Number of children: None    Years of education: Not on file    Highest education level: Not on file   Occupational History    Not on file   Tobacco Use    Smoking status: Never Smoker    Smokeless tobacco: Never Used   Substance and Sexual Activity    Alcohol use: No     Frequency: Never     Binge frequency: Never     Comment:      Drug use: No    Sexual activity: Not Currently   Social History Narrative    1 cup tea daily; occ pop; no coffee      Vaccine Date   COVID-19 12/23/20, 1/13/21   Influenza 10/12/21   Prevnar 13 12/29/2015   Pneumovax 23 1/12/2017     No family history on file.     PHYSICAL EXAM:   Vitals:    02/11/22 0914   BP: 134/73   Pulse: 86   Resp: 18   Temp: 97.7 °F (36.5 °C)   SpO2: 98%   Weight: 198 lb 9.6 oz (90.1 kg)   Height: 5' 4\" (1.626 m)     Estimated body mass index is 34.09 kg/m² as calculated from the following:    Height as of this encounter: 5' 4\" (1.626 m). Weight as of this encounter: 198 lb 9.6 oz (90.1 kg). GEN:  elderly [facility] female patient seated in recliner in NAD. HEAD:  atraumatic, normocephalic. EYES:  EOMI, PERRL, no cataracts, conjunctivae appear normal.  ENT:   Good hearing, EACs without wax, TM's normal, nasal septum midline, no significant congestion, oral cavity without lesions, poor-fair dentition, no dentures. NECK:  fair-good ROM, no palpable masses, no carotid bruits, no JVD. LUNGS:  clear to ausc, no rales, rhonchi or wheezes. HEART:  RRR, no murmurs or gallops. ABD:  soft, non-tender to palp, no palp masses or HSM, normal BS. BACK:  no scoliosis or kyphosis, non-tender to palp. EXTREMITIES: Trace-no edema, no ulcerations, varicosities or erythema. No gross deformities. MUSCULOSKELETAL: Good ROM of all joints, non tender to palp and or with movement. SKIN:  No ulcerations or breakdown, rash, ecchymosis, or other lesions. NEURO:  No tremor, motor UEs 5/5 LEs  5/5, sensory normal, able to stand and walk slowly using rollator with mild ataxia. PSYCH:  Pleasant and cooperative. Fluid speech, oriented to person, place and time. No delusional statements. Medications reviewed. Labs 12/9/21 Na 130, GFR 51, A1c 6.3  9/7/21 GFR 63, Na 128, A1c 6.2  5/20/21 HH 11.9/36.4, GFR 47, Na 128, Cl 95, LFT nl, A1c 6.1, TSH 1.151, , Tri 76, HDL 47, LDL 57   2/22/21 Na 130, GFR 52      ASSESSMENT:  Elderly female has HTN (hypertension), benign; Diabetes mellitus type 2, diet-controlled ([address] DDD (degenerative disc disease), cervical; Non-rheumatic mitral regurgitation; AI (aortic insufficiency); Pacemaker; Hyperlipidemia LDL goal <100; Acquired hypothyroidism; Syncope and collapse; Hyponatremia; Primary osteoarthritis involving multiple joints; Mild intermittent asthma without complication;  History of complete heart block; Depression; Difficulty walking; Chronic fatigue; Overactive bladder; Constipation; and Urinary incontinence on their problem list.     Donnie was seen today for hypertension and joint pain. Diagnoses and all orders for this visit:    HTN (hypertension), benign    Primary osteoarthritis involving multiple joints    Overactive bladder    Difficulty walking    Constipation, unspecified constipation type        PLAN:    Encouraged to elevate legs more. Discussed diet and activity level. Smaller portions at meals - skip breakfast and snacks. Instructed to drink less water. Continue to use BioFreeze on painful hand joints. Continue Tylenol for pain as needed. Encouraged to drink plenty of fluids. Check BMP & A1c, then every 3 months, next in March. Continue other meds as per Rx List. Recheck 1 month. 40 minutes spent on visit, 25 minutes involved education/counseling regarding HTN, DM, weight, urinary, and DJD disease processes, treatment options, meds and coordination of care. Current Outpatient Medications   Medication Sig Dispense Refill    amLODIPine (NORVASC) 5 MG tablet TAKE 1 TABLET BY MOUTH AT  BEDTIME. 30 tablet 11    metoprolol succinate (TOPROL XL) 25 MG extended release tablet TAKE 1 TABLET BY MOUTH AT  BEDTIME. 90 tablet 3    spironolactone (ALDACTONE) 25 MG tablet TAKE 1 TABLET BY MOUTH DAILY 30 tablet 11    loratadine (CLARITIN) 10 MG tablet TAKE 1 TABLET BY MOUTH ONCE A DAY 90 tablet 3    polyethylene glycol (GLYCOLAX) 17 GM/SCOOP powder Take 17 g by mouth daily as needed (constipation) 134 g 5    folic acid (FOLVITE) 628 MCG tablet TAKE 1 TABLET BY MOUTH IN  THE MORNING.  30 tablet 11    metoprolol succinate (TOPROL XL) 50 MG extended release tablet TAKE 1 TABLET BY MOUTH DAILY 30 tablet 7    ipratropium (ATROVENT) 0.06 % nasal spray 2 sprays by Each Nostril route 4 times daily as needed for Rhinitis 1 Bottle 5    acetaminophen (ACETAMINOPHEN EXTRA STRENGTH) 500 MG tablet TAKE 1 TABLET BY MOUTH FOUR TIMES DAILY AS NEEDED FOR PAIN. 120 tablet 11    aspirin (ASPIR-LOW) 81 MG EC tablet TAKE 1 TABLET BY MOUTH ONCE DAILY 30 tablet 11    Incontinence Supply Disposable MISC 2 each by Does not apply route as needed (Incontinence) Use prn. Dispense 2 cases. 11 Refills. Size large 2 each 11    vitamin D (CHOLECALCIFEROL) 50 MCG (2000 UT) TABS tablet Take 1 tablet by mouth daily      fluticasone (FLONASE) 50 MCG/ACT nasal spray 2 sprays by Each Nostril route daily      Lidocaine HCl (L-E) gel Apply topically once      simvastatin (ZOCOR) 10 MG tablet TAKE 1 TABLET BY MOUTH AT  BEDTIME. 30 tablet 11    calcium carbonate-vitamin D 600-200 MG-UNIT TABS Take 1 tablet by mouth 2 times daily 60 tablet 11    levothyroxine (SYNTHROID) 100 MCG tablet Take 1 tablet by mouth Daily TAKE 1 TABLET BY MOUTH IN THE MORNING 30 MINUTES BEFORE BREAKFAST OR OTHER MEDICATIONS. 90 tablet 3    Omega-3 Fatty Acids (FISH OIL) 1200 MG CAPS TAKE 1 CAPSULE BY MOUTH IN THE MORNING. 30 capsule 11     No current facility-administered medications for this visit. Return in about 1 month (around 3/11/2022). An electronic signature was used to authenticate this note.     --Georgia Salas MD on 2/11/2022 at 10:03 AM

## 2022-02-11 ENCOUNTER — OFFICE VISIT (OUTPATIENT)
Dept: PRIMARY CARE CLINIC | Age: 87
End: 2022-02-11
Payer: COMMERCIAL

## 2022-02-11 VITALS
HEIGHT: 64 IN | WEIGHT: 198.6 LBS | DIASTOLIC BLOOD PRESSURE: 73 MMHG | BODY MASS INDEX: 33.9 KG/M2 | HEART RATE: 86 BPM | OXYGEN SATURATION: 98 % | RESPIRATION RATE: 18 BRPM | SYSTOLIC BLOOD PRESSURE: 134 MMHG | TEMPERATURE: 97.7 F

## 2022-02-11 DIAGNOSIS — I10 HTN (HYPERTENSION), BENIGN: Primary | Chronic | ICD-10-CM

## 2022-02-11 DIAGNOSIS — N32.81 OVERACTIVE BLADDER: ICD-10-CM

## 2022-02-11 DIAGNOSIS — M15.9 PRIMARY OSTEOARTHRITIS INVOLVING MULTIPLE JOINTS: ICD-10-CM

## 2022-02-11 DIAGNOSIS — R26.2 DIFFICULTY WALKING: ICD-10-CM

## 2022-02-11 DIAGNOSIS — K59.00 CONSTIPATION, UNSPECIFIED CONSTIPATION TYPE: ICD-10-CM

## 2022-02-11 PROCEDURE — G8484 FLU IMMUNIZE NO ADMIN: HCPCS | Performed by: FAMILY MEDICINE

## 2022-02-11 PROCEDURE — G8417 CALC BMI ABV UP PARAM F/U: HCPCS | Performed by: FAMILY MEDICINE

## 2022-02-11 PROCEDURE — 1090F PRES/ABSN URINE INCON ASSESS: CPT | Performed by: FAMILY MEDICINE

## 2022-02-11 PROCEDURE — 1123F ACP DISCUSS/DSCN MKR DOCD: CPT | Performed by: FAMILY MEDICINE

## 2022-03-10 ENCOUNTER — OFFICE VISIT (OUTPATIENT)
Dept: PRIMARY CARE CLINIC | Age: 87
End: 2022-03-10
Payer: COMMERCIAL

## 2022-03-10 VITALS
OXYGEN SATURATION: 93 % | WEIGHT: 200.6 LBS | RESPIRATION RATE: 18 BRPM | HEIGHT: 64 IN | BODY MASS INDEX: 34.25 KG/M2 | TEMPERATURE: 96.6 F | HEART RATE: 68 BPM | SYSTOLIC BLOOD PRESSURE: 116 MMHG | DIASTOLIC BLOOD PRESSURE: 70 MMHG

## 2022-03-10 DIAGNOSIS — M15.9 PRIMARY OSTEOARTHRITIS INVOLVING MULTIPLE JOINTS: ICD-10-CM

## 2022-03-10 DIAGNOSIS — K59.00 CONSTIPATION, UNSPECIFIED CONSTIPATION TYPE: ICD-10-CM

## 2022-03-10 DIAGNOSIS — N32.81 OVERACTIVE BLADDER: ICD-10-CM

## 2022-03-10 DIAGNOSIS — E11.9 DIABETES MELLITUS TYPE 2, DIET-CONTROLLED (HCC): ICD-10-CM

## 2022-03-10 DIAGNOSIS — I10 HTN (HYPERTENSION), BENIGN: Primary | Chronic | ICD-10-CM

## 2022-03-10 DIAGNOSIS — N39.45 CONTINUOUS LEAKAGE OF URINE: ICD-10-CM

## 2022-03-10 PROCEDURE — 1090F PRES/ABSN URINE INCON ASSESS: CPT | Performed by: FAMILY MEDICINE

## 2022-03-10 PROCEDURE — 0509F URINE INCON PLAN DOCD: CPT | Performed by: FAMILY MEDICINE

## 2022-03-10 PROCEDURE — G8484 FLU IMMUNIZE NO ADMIN: HCPCS | Performed by: FAMILY MEDICINE

## 2022-03-10 PROCEDURE — 1123F ACP DISCUSS/DSCN MKR DOCD: CPT | Performed by: FAMILY MEDICINE

## 2022-03-10 PROCEDURE — G8417 CALC BMI ABV UP PARAM F/U: HCPCS | Performed by: FAMILY MEDICINE

## 2022-03-10 RX ORDER — FLUTICASONE PROPIONATE 50 MCG
2 SPRAY, SUSPENSION (ML) NASAL DAILY
Qty: 16 G | Refills: 11 | Status: SHIPPED
Start: 2022-03-10 | End: 2022-04-05

## 2022-03-10 NOTE — PROGRESS NOTES
3/10/2022    Home visit medically necessary in lieu of an office visit due to: [facility] resident, uses walker, difficult to get out. HPI:  Patient says she is doing about the same. Her knees, hands and feet DJD pain is about the same. She continues to use BioFreeze and takes Tylenol for relief. Her legs have not been swelling. She elevates her legs as much as possible when she is in her room. She is watching her diet more and trying to walk more. Her overactive bladder is better than it used to be. She has some urine still leaking out at times. She does do her Kegel exercises at times. She wears Depends. She has not had any falls in this month. She has been moving her bowels well. REVIEW OF SYSTEMS:    GENERAL: Appetite good. No weight change, generally healthy, no change in strength or exercise tolerance. CARDIOVASCULAR: No chest pains, no murmurs, no palpitations, no syncope, no orthopnea. LEGS HAVE BEEN SWELLING. RESPIRATORY: No shortness of breath, no pain with breathing, no wheezing, no hemoptysis, no cough. BREASTS:  No lumps, discharge or pain. GASTROINTESTINAL: No change in appetite, no dysphagia, no heartburn, no abdominal pains, no diarrhea, no bowel habit changes, no emesis, no melena, no hemorrhoids. CONSTIPATION SOMETIMES. GENITOURINARY: No incontinence or retention, no urinary urgency, no frequent UTIs, no dysuria, no change in nature of urine. STRESS INCONTINENCE AT TIMES. MUSCULOSKELETAL: No swelling or redness of joints, no limitation of range of motion, no weakness or numbness. HAS HAD JT PAINS BUT NOT NOW. NEURO/PSYCH: No weakness, no tremor, no seizures, no changes in mentation, no ataxia. No depressive symptoms, no changes in sleep habits, no changes in thought content. MEMORY PROBLEMS. All other systems negative.     Allergies   Allergen Reactions    Flomax [Tamsulosin Hcl] Other (See Comments)     Causes chest pain    Ace Inhibitors Swelling     Causes lip swelling    Flomax [Tamsulosin]     Alendronate Sodium Other (See Comments)     Pt unsure of allergies    Lasix [Furosemide] Other (See Comments)     weakness    Lisinopril Rash    Penicillins Rash     Rash    Propolis Other (See Comments)    Vioxx [Rofecoxib] Other (See Comments)     Past Medical History:   Diagnosis Date    Asthma     Atrioventricular (AV) dissociation     AV block, 1st degree 2/11/2015    Diabetes mellitus ([address]     GERD (gastroesophageal reflux disease)     Herniated disc     Cervical    Hyperlipidemia     Hypertension     Mild aortic regurgitation 11/5/13    Mitral regurgitation 11/5/13    mild-moderate    Nontoxic uninodular goiter     OA (osteoarthritis of spine)     cervical spine    Obesity      Past Surgical History:   Procedure Laterality Date    A-V CARDIAC PACEMAKER INSERTION Left 06/13/2016    Dual chamber pacemaker [address] by Dr Mattie Quick History     Socioeconomic History    Marital status:      Spouse name: Not on file    Number of children: None    Years of education: Not on file    Highest education level: Not on file   Occupational History    Not on file   Tobacco Use    Smoking status: Never Smoker    Smokeless tobacco: Never Used   Substance and Sexual Activity    Alcohol use: No     Frequency: Never     Binge frequency: Never     Comment:      Drug use: No    Sexual activity: Not Currently   Social History Narrative    1 cup tea daily; occ pop; no coffee      Vaccine Date   COVID-19 12/23/20, 1/13/21   Influenza 10/12/21   Prevnar 13 12/29/2015   Pneumovax 23 1/12/2017     No family history on file.     PHYSICAL EXAM:   Vitals:    03/10/22 0911   BP: 116/70   Site: Left Wrist   Position: Sitting   Pulse: 68   Resp: 18   Temp: 96.6 °F (35.9 °C)   TempSrc: Infrared   SpO2: 93%   Weight: 200 lb 9.6 oz (91 kg)   Height: 5' 4\" (1.626 m)     Estimated body mass index is 34.43 kg/m² as calculated from the following: Height as of this encounter: 5' 4\" (1.626 m). Weight as of this encounter: 200 lb 9.6 oz (91 kg). GEN:  elderly [facility] female patient seated in recliner in NAD. HEAD:  atraumatic, normocephalic. EYES:  EOMI, PERRL, no cataracts, conjunctivae appear normal.  ENT:   Good hearing, EACs without wax, TM's normal, nasal septum midline, no significant congestion, oral cavity without lesions, poor-fair dentition, no dentures. NECK:  fair-good ROM, no palpable masses, no carotid bruits, no JVD. LUNGS:  clear to ausc, no rales, rhonchi or wheezes. HEART:  RRR, no murmurs or gallops. ABD:  soft, non-tender to palp, no palp masses or HSM, normal BS. BACK:  no scoliosis or kyphosis, non-tender to palp. EXTREMITIES: Trace-no edema, no ulcerations, varicosities or erythema. No gross deformities. MUSCULOSKELETAL: Good ROM of all joints, non tender to palp and or with movement. SKIN:  No ulcerations or breakdown, rash, ecchymosis, or other lesions. NEURO:  No tremor, motor UEs 5/5 LEs  5/5, sensory normal, able to stand and walk slowly using rollator with mild ataxia. PSYCH:  Pleasant and cooperative. Fluid speech, oriented to person, place and time. No delusional statements. Medications reviewed. Labs 12/9/21 Na 130, GFR 51, A1c 6.3  9/7/21 GFR 63, Na 128, A1c 6.2  5/20/21 HH 11.9/36.4, GFR 47, Na 128, Cl 95, LFT nl, A1c 6.1, TSH 1.151, , Tri 76, HDL 47, LDL 57     ASSESSMENT:  Elderly female has HTN (hypertension), benign; Diabetes mellitus type 2, diet-controlled ([address] DDD (degenerative disc disease), cervical; Non-rheumatic mitral regurgitation; AI (aortic insufficiency); Pacemaker; Hyperlipidemia LDL goal <100; Acquired hypothyroidism; Syncope and collapse; Hyponatremia; Primary osteoarthritis involving multiple joints; Mild intermittent asthma without complication; History of complete heart block; Depression; Difficulty walking; Chronic fatigue;  Overactive bladder; Constipation; and Urinary incontinence on their problem list.     Donnie was seen today for hypertension and joint pain. Diagnoses and all orders for this visit:    HTN (hypertension), benign  -     Basic Metabolic Panel; Future    Primary osteoarthritis involving multiple joints    Overactive bladder    Continuous leakage of urine    Constipation, unspecified constipation type    Diabetes mellitus type 2, diet-controlled ([facility])  -     Hemoglobin A1C; Future    Other orders  -     fluticasone (FLONASE) 50 MCG/ACT nasal spray; 2 sprays by Each Nostril route daily        PLAN:    Check labs. Encouraged to elevate legs more. Discussed diet and activity level. Smaller portions at meals - skip breakfast and snacks. Instructed to drink less water. Continue to use BioFreeze on painful hand joints. Continue Tylenol for pain as needed. Encouraged to drink plenty of fluids. Check BMP & A1c, then every 3 months, next in June with yearly labs. Continue other meds as per Rx List. Recheck 1 month. 40 minutes spent on visit, 25 minutes involved education/counseling regarding HTN, DM, weight, urinary, and DJD disease processes, treatment options, meds and coordination of care. Current Outpatient Medications   Medication Sig Dispense Refill    fluticasone (FLONASE) 50 MCG/ACT nasal spray 2 sprays by Each Nostril route daily 16 g 11    amLODIPine (NORVASC) 5 MG tablet TAKE 1 TABLET BY MOUTH AT  BEDTIME. 30 tablet 11    metoprolol succinate (TOPROL XL) 25 MG extended release tablet TAKE 1 TABLET BY MOUTH AT  BEDTIME. 90 tablet 3    spironolactone (ALDACTONE) 25 MG tablet TAKE 1 TABLET BY MOUTH DAILY 30 tablet 11    loratadine (CLARITIN) 10 MG tablet TAKE 1 TABLET BY MOUTH ONCE A DAY 90 tablet 3    polyethylene glycol (GLYCOLAX) 17 GM/SCOOP powder Take 17 g by mouth daily as needed (constipation) 980 g 5    folic acid (FOLVITE) 946 MCG tablet TAKE 1 TABLET BY MOUTH IN  THE MORNING.  30 tablet 11    metoprolol succinate (TOPROL XL) 50 MG extended release tablet TAKE 1 TABLET BY MOUTH DAILY 30 tablet 7    ipratropium (ATROVENT) 0.06 % nasal spray 2 sprays by Each Nostril route 4 times daily as needed for Rhinitis 1 Bottle 5    acetaminophen (ACETAMINOPHEN EXTRA STRENGTH) 500 MG tablet TAKE 1 TABLET BY MOUTH FOUR TIMES DAILY AS NEEDED FOR PAIN. 120 tablet 11    aspirin (ASPIR-LOW) 81 MG EC tablet TAKE 1 TABLET BY MOUTH ONCE DAILY 30 tablet 11    Incontinence Supply Disposable MISC 2 each by Does not apply route as needed (Incontinence) Use prn. Dispense 2 cases. 11 Refills. Size large 2 each 11    vitamin D (CHOLECALCIFEROL) 50 MCG (2000 UT) TABS tablet Take 1 tablet by mouth daily      Lidocaine HCl (L-E) gel Apply topically once      simvastatin (ZOCOR) 10 MG tablet TAKE 1 TABLET BY MOUTH AT  BEDTIME. 30 tablet 11    calcium carbonate-vitamin D 600-200 MG-UNIT TABS Take 1 tablet by mouth 2 times daily 60 tablet 11    levothyroxine (SYNTHROID) 100 MCG tablet Take 1 tablet by mouth Daily TAKE 1 TABLET BY MOUTH IN THE MORNING 30 MINUTES BEFORE BREAKFAST OR OTHER MEDICATIONS. 90 tablet 3    Omega-3 Fatty Acids (FISH OIL) 1200 MG CAPS TAKE 1 CAPSULE BY MOUTH IN THE MORNING. 30 capsule 11     No current facility-administered medications for this visit. Return in about 1 month (around 4/10/2022). An electronic signature was used to authenticate this note.     --Prema Jimenez MD on 3/10/2022 at 9:59 AM

## 2022-03-11 DIAGNOSIS — E11.9 DIABETES MELLITUS TYPE 2, DIET-CONTROLLED (HCC): ICD-10-CM

## 2022-03-11 DIAGNOSIS — I10 HTN (HYPERTENSION), BENIGN: Chronic | ICD-10-CM

## 2022-03-11 LAB
AVERAGE GLUCOSE: 137
BUN BLDV-MCNC: 29 MG/DL
CALCIUM SERPL-MCNC: 9.6 MG/DL
CHLORIDE BLD-SCNC: 96 MMOL/L
CO2: 22 MMOL/L
CREAT SERPL-MCNC: 1.1 MG/DL
GFR CALCULATED: 57
GLUCOSE BLD-MCNC: 80 MG/DL
HBA1C MFR BLD: 6.4 %
POTASSIUM SERPL-SCNC: 5.1 MMOL/L
SODIUM BLD-SCNC: 130 MMOL/L

## 2022-04-05 ENCOUNTER — OFFICE VISIT (OUTPATIENT)
Dept: PRIMARY CARE CLINIC | Age: 87
End: 2022-04-05
Payer: COMMERCIAL

## 2022-04-05 VITALS
OXYGEN SATURATION: 96 % | BODY MASS INDEX: 33.8 KG/M2 | DIASTOLIC BLOOD PRESSURE: 62 MMHG | TEMPERATURE: 96.9 F | WEIGHT: 198 LBS | HEIGHT: 64 IN | RESPIRATION RATE: 20 BRPM | SYSTOLIC BLOOD PRESSURE: 130 MMHG | HEART RATE: 72 BPM

## 2022-04-05 DIAGNOSIS — K59.00 CONSTIPATION, UNSPECIFIED CONSTIPATION TYPE: ICD-10-CM

## 2022-04-05 DIAGNOSIS — E11.9 DIABETES MELLITUS TYPE 2, DIET-CONTROLLED (HCC): Chronic | ICD-10-CM

## 2022-04-05 DIAGNOSIS — I10 HTN (HYPERTENSION), BENIGN: Primary | Chronic | ICD-10-CM

## 2022-04-05 DIAGNOSIS — M15.9 PRIMARY OSTEOARTHRITIS INVOLVING MULTIPLE JOINTS: ICD-10-CM

## 2022-04-05 DIAGNOSIS — N39.45 CONTINUOUS LEAKAGE OF URINE: ICD-10-CM

## 2022-04-05 DIAGNOSIS — N32.81 OVERACTIVE BLADDER: ICD-10-CM

## 2022-04-05 DIAGNOSIS — J30.2 SEASONAL ALLERGIC RHINITIS, UNSPECIFIED TRIGGER: ICD-10-CM

## 2022-04-05 PROCEDURE — 1123F ACP DISCUSS/DSCN MKR DOCD: CPT | Performed by: PHYSICIAN ASSISTANT

## 2022-04-05 PROCEDURE — 3044F HG A1C LEVEL LT 7.0%: CPT | Performed by: PHYSICIAN ASSISTANT

## 2022-04-05 PROCEDURE — G8417 CALC BMI ABV UP PARAM F/U: HCPCS | Performed by: PHYSICIAN ASSISTANT

## 2022-04-05 PROCEDURE — 0509F URINE INCON PLAN DOCD: CPT | Performed by: PHYSICIAN ASSISTANT

## 2022-04-05 PROCEDURE — 1090F PRES/ABSN URINE INCON ASSESS: CPT | Performed by: PHYSICIAN ASSISTANT

## 2022-04-05 RX ORDER — FLUTICASONE PROPIONATE 50 MCG
SPRAY, SUSPENSION NASAL
Qty: 1 EACH | Refills: 11 | Status: SHIPPED | OUTPATIENT
Start: 2022-04-05

## 2022-04-05 NOTE — PROGRESS NOTES
4/5/2022    Home visit medically necessary in lieu of an office visit due to: [facility] resident, uses walker, difficult to get out. HPI:  Donnie does not want to use Fluticasone, says this generic spray causes nose bleeds - wants FLONASE. Her knees, hands and feet DJD pain is about the same. She continues to use BioFreeze and takes Tylenol for relief. Her legs have not been swelling. She elevates her legs as much as possible when she is in her room. She is watching her diet more and trying to walk more. Her overactive bladder is better than it used to be. She has some urine still leaking out at times. She does do her Kegel exercises at times. She wears Depends. She has not had any falls in this month. She has been moving her bowels well. REVIEW OF SYSTEMS:  GENERAL: Appetite good. No weight change, generally healthy, no change in strength or exercise tolerance. CARDIOVASCULAR: No chest pains, no murmurs, no palpitations, no syncope, no orthopnea. LEGS HAVE BEEN SWELLING. RESPIRATORY: No shortness of breath, no pain with breathing, no wheezing, no hemoptysis, no cough. BREASTS:  No lumps, discharge or pain. GASTROINTESTINAL: No change in appetite, no dysphagia, no heartburn, no abdominal pains, no diarrhea, no bowel habit changes, no emesis, no melena, no hemorrhoids. CONSTIPATION SOMETIMES. GENITOURINARY: No incontinence or retention, no urinary urgency, no frequent UTIs, no dysuria, no change in nature of urine. STRESS INCONTINENCE AT TIMES. MUSCULOSKELETAL: No swelling or redness of joints, no limitation of range of motion, no weakness or numbness. HAS HAD JT PAINS BUT NOT NOW. NEURO/PSYCH: No weakness, no tremor, no seizures, no changes in mentation, no ataxia. No depressive symptoms, no changes in sleep habits, no changes in thought content. MEMORY PROBLEMS. All other systems negative.     Allergies   Allergen Reactions    Flomax [Tamsulosin Hcl] Other (See Comments)     Causes chest pain    Ace Inhibitors Swelling     Causes lip swelling    Flomax [Tamsulosin]     Alendronate Sodium Other (See Comments)     Pt unsure of allergies    Lasix [Furosemide] Other (See Comments)     weakness    Lisinopril Rash    Penicillins Rash     Rash    Propolis Other (See Comments)    Vioxx [Rofecoxib] Other (See Comments)     Past Medical History:   Diagnosis Date    Asthma     Atrioventricular (AV) dissociation     AV block, 1st degree 2/11/2015    Diabetes mellitus ([address]     GERD (gastroesophageal reflux disease)     Herniated disc     Cervical    Hyperlipidemia     Hypertension     Mild aortic regurgitation 11/5/13    Mitral regurgitation 11/5/13    mild-moderate    Nontoxic uninodular goiter     OA (osteoarthritis of spine)     cervical spine    Obesity      Past Surgical History:   Procedure Laterality Date    A-V CARDIAC PACEMAKER INSERTION Left 06/13/2016    Dual chamber pacemaker [address] by Dr Grace Tapia History     Socioeconomic History    Marital status:      Spouse name: Not on file    Number of children: None    Years of education: Not on file    Highest education level: Not on file   Occupational History    Not on file   Tobacco Use    Smoking status: Never Smoker    Smokeless tobacco: Never Used   Substance and Sexual Activity    Alcohol use: No     Frequency: Never     Binge frequency: Never     Comment:      Drug use: No    Sexual activity: Not Currently   Social History Narrative    1 cup tea daily; occ pop; no coffee      No family history on file.     Vaccine Date   COVID-19 12/23/20, 1/13/21   Influenza 10/12/21   Prevnar 13 12/29/2015   Pneumovax 23 1/12/2017     Code Status Full (7/30/21)       PHYSICAL EXAM:     Vitals:    04/05/22 0826   BP: 130/62   Pulse: 72   Resp: 20   Temp: 96.9 °F (36.1 °C)   TempSrc: Temporal   SpO2: 96%   Weight: 198 lb (89.8 kg)   Height: 5' 4\" (1.626 m)        Estimated body mass index is 33.99 kg/m² as calculated from the following:    Height as of this encounter: 5' 4\" (1.626 m). Weight as of this encounter: 198 lb (89.8 kg). GEN:  elderly [facility] female patient seated in recliner in NAD. HEAD:  atraumatic, normocephalic. EYES:  EOMI, PERRL, no cataracts, conjunctivae appear normal.  ENT:   Good hearing, EACs without wax, TM's normal, nasal septum midline, no significant congestion, oral cavity without lesions, poor-fair dentition, no dentures. NECK:  fair-good ROM, no palpable masses, no carotid bruits, no JVD. LUNGS:  clear to ausc, no rales, rhonchi or wheezes. HEART:  RRR, no murmurs or gallops. ABD:  soft, non-tender to palp, no palp masses or HSM, normal BS. BACK:  no scoliosis or kyphosis, non-tender to palp. EXTREMITIES: Trace-no edema, no ulcerations, varicosities or erythema. No gross deformities. MUSCULOSKELETAL: Good ROM of all joints, non tender to palp and or with movement. SKIN:  No ulcerations or breakdown, rash, ecchymosis, or other lesions. NEURO:  No tremor, motor UEs 5/5 LEs  5/5, sensory normal, able to stand and walk slowly using rollator with mild ataxia. PSYCH:  Pleasant and cooperative. Fluid speech, oriented to person, place and time. No delusional statements. Medications reviewed. Labs: 3/11/21 GFR 57, Na 130, A1c 6.4  12/9/21 Na 130, GFR 51, A1c 6.3  9/7/21 GFR 63, Na 128, A1c 6.2  5/20/21 HH 11.9/36.4, GFR 47, Na 128, Cl 95, LFT nl, A1c 6.1, TSH 1.151, , Tri 76, HDL 47, LDL 57     ASSESSMENT:  Elderly female has HTN (hypertension), benign; Diabetes mellitus type 2, diet-controlled ([address] DDD (degenerative disc disease), cervical; Non-rheumatic mitral regurgitation; AI (aortic insufficiency); Pacemaker; Hyperlipidemia LDL goal <100; Acquired hypothyroidism; Syncope and collapse; Hyponatremia; Primary osteoarthritis involving multiple joints; Mild intermittent asthma without complication;  History of complete heart block; Depression; Difficulty walking; Chronic fatigue; Overactive bladder; Constipation; Urinary incontinence; and Seasonal allergic rhinitis on their problem list.     Donnie was seen today for hypertension. Diagnoses and all orders for this visit:    HTN (hypertension), benign    Primary osteoarthritis involving multiple joints    Overactive bladder    Continuous leakage of urine    Constipation, unspecified constipation type    Diabetes mellitus type 2, diet-controlled ([facility])    Seasonal allergic rhinitis, unspecified trigger  -     FLONASE 50 MCG/ACT nasal spray; Two sprays to each nostril daily.        PLAN:  Encouraged to elevate legs more. Discussed diet and activity level. Smaller portions at meals - skip breakfast and snacks. Instructed to drink less water. Continue to use BioFreeze on painful hand joints. Continue Tylenol for pain as needed. Encouraged to drink plenty of fluids. Check BMP & A1c every 3 months, next in June with yearly labs. Continue other meds as per Rx List. Recheck 1 month. 40 minutes spent on visit, 25 minutes involved education/counseling regarding HTN, DM, weight, urinary, and DJD disease processes, treatment options, meds and coordination of care. Current Outpatient Medications   Medication Sig Dispense Refill    FLONASE 50 MCG/ACT nasal spray Two sprays to each nostril daily. 1 each 11    amLODIPine (NORVASC) 5 MG tablet TAKE 1 TABLET BY MOUTH AT  BEDTIME. 30 tablet 11    metoprolol succinate (TOPROL XL) 25 MG extended release tablet TAKE 1 TABLET BY MOUTH AT  BEDTIME. 90 tablet 3    spironolactone (ALDACTONE) 25 MG tablet TAKE 1 TABLET BY MOUTH DAILY 30 tablet 11    loratadine (CLARITIN) 10 MG tablet TAKE 1 TABLET BY MOUTH ONCE A DAY 90 tablet 3    polyethylene glycol (GLYCOLAX) 17 GM/SCOOP powder Take 17 g by mouth daily as needed (constipation) 424 g 5    folic acid (FOLVITE) 081 MCG tablet TAKE 1 TABLET BY MOUTH IN  THE MORNING.  30 tablet 11    metoprolol succinate (TOPROL XL) 50 MG extended release tablet TAKE 1 TABLET BY MOUTH DAILY 30 tablet 7    ipratropium (ATROVENT) 0.06 % nasal spray 2 sprays by Each Nostril route 4 times daily as needed for Rhinitis 1 Bottle 5    acetaminophen (ACETAMINOPHEN EXTRA STRENGTH) 500 MG tablet TAKE 1 TABLET BY MOUTH FOUR TIMES DAILY AS NEEDED FOR PAIN. 120 tablet 11    aspirin (ASPIR-LOW) 81 MG EC tablet TAKE 1 TABLET BY MOUTH ONCE DAILY 30 tablet 11    Incontinence Supply Disposable MISC 2 each by Does not apply route as needed (Incontinence) Use prn. Dispense 2 cases. 11 Refills. Size large 2 each 11    vitamin D (CHOLECALCIFEROL) 50 MCG (2000 UT) TABS tablet Take 1 tablet by mouth daily      simvastatin (ZOCOR) 10 MG tablet TAKE 1 TABLET BY MOUTH AT  BEDTIME. 30 tablet 11    calcium carbonate-vitamin D 600-200 MG-UNIT TABS Take 1 tablet by mouth 2 times daily 60 tablet 11    levothyroxine (SYNTHROID) 100 MCG tablet Take 1 tablet by mouth Daily TAKE 1 TABLET BY MOUTH IN THE MORNING 30 MINUTES BEFORE BREAKFAST OR OTHER MEDICATIONS. 90 tablet 3    Omega-3 Fatty Acids (FISH OIL) 1200 MG CAPS TAKE 1 CAPSULE BY MOUTH IN THE MORNING. 30 capsule 11     No current facility-administered medications for this visit. Return in about 1 month (around 5/5/2022) for regular visit. An electronic signature was used to authenticate this note.     --Alison Cm PA-C on 4/5/2022 at 10:39 AM

## 2022-05-02 ENCOUNTER — TELEPHONE (OUTPATIENT)
Dept: PRIMARY CARE CLINIC | Age: 87
End: 2022-05-02

## 2022-05-02 NOTE — TELEPHONE ENCOUNTER
She should call the cardiologist about using electric toothbrush with pacemaker. As far as her retaining fluid, every time I see her I evaluate for fluid retention. She looks puffy but so far she is not retaining fluid and does not have edema. It is from being overweight.

## 2022-05-02 NOTE — TELEPHONE ENCOUNTER
I called to verify pt's appt with Louise Ko on Wednesday May 4, 2022. Her niece states Donnie went to the dentist and was told to use an electric toothbrush for bleeding gums. Donnie read the direction on the brush and told her niece that she couldn't use it due to her pacemaker. Candis Velez wants your opinion on this. Jessica Triplett is complaining that pt is holding a lot of fluid in hands, arms and feet. Family members have made comments about how swollen the pt looks. She is having a hard time walking as well. She said pt's walker isnt fixed right and is afraid the pt will fall. 3

## 2022-05-02 NOTE — TELEPHONE ENCOUNTER
Spoke antonio Freitas notified her to consult cardiologist and that Joana Rodriguez is monitoring Hettie for edema at every visit.

## 2022-05-03 NOTE — PROGRESS NOTES
5/4/2022    Home visit medically necessary in lieu of an office visit due to: senior living resident, uses walker, difficult to get out. HPI:  Patient says she feels ok. Says her niece gave her a bottle of name brand FLONASE for allergy symptoms. Otherwise, her knees, hands and feet DJD pain is about the same. She continues to use BioFreeze and takes Tylenol for relief. Her legs have no ^ swelling. She elevates her legs as much as possible when she is in her room. She has compression stockings but has not used them in a long time. senior living nurse confirms there is no order for them. She is watching her diet more and trying to walk more. Her overactive bladder is better than it used to be. She has some urine still leaking out occasionally. She does do her Kegel exercises at times. She wears Depends. She has not had any falls in this month. She has been moving her bowels well. Saw dentist this past month, was told use an electric toothbrush but was told to ask the cardiologist (due to pacer) if she can use it. REVIEW OF SYSTEMS:  GENERAL: Appetite good. No weight change, generally healthy, no change in strength or exercise tolerance. CARDIOVASCULAR: No chest pains, no murmurs, no palpitations, no syncope, no orthopnea. LEGS HAVE BEEN SWELLING. RESPIRATORY: No shortness of breath, no pain with breathing, no wheezing, no hemoptysis, no cough. BREASTS:  No lumps, discharge or pain. GASTROINTESTINAL: No change in appetite, no dysphagia, no heartburn, no abdominal pains, no diarrhea, no bowel habit changes, no emesis, no melena, no hemorrhoids. CONSTIPATION SOMETIMES. GENITOURINARY: No incontinence or retention, no urinary urgency, no frequent UTIs, no dysuria, no change in nature of urine. STRESS INCONTINENCE AT TIMES. MUSCULOSKELETAL: No swelling or redness of joints, no limitation of range of motion, no weakness or numbness. HAS HAD JT PAINS BUT NOT NOW.  NEURO/PSYCH: No weakness, no tremor, no seizures, no changes in mentation, no ataxia. No depressive symptoms, no changes in sleep habits, no changes in thought content. MEMORY PROBLEMS. All other systems negative. Allergies   Allergen Reactions    Flomax [Tamsulosin Hcl] Other (See Comments)     Causes chest pain    Ace Inhibitors Swelling     Causes lip swelling    Flomax [Tamsulosin]     Alendronate Sodium Other (See Comments)     Pt unsure of allergies    Lasix [Furosemide] Other (See Comments)     weakness    Lisinopril Rash    Penicillins Rash     Rash    Propolis Other (See Comments)    Vioxx [Rofecoxib] Other (See Comments)     Past Medical History:   Diagnosis Date    Asthma     Atrioventricular (AV) dissociation     AV block, 1st degree 2/11/2015    Diabetes mellitus ([address]     GERD (gastroesophageal reflux disease)     Herniated disc     Cervical    Hyperlipidemia     Hypertension     Mild aortic regurgitation 11/5/13    Mitral regurgitation 11/5/13    mild-moderate    Nontoxic uninodular goiter     OA (osteoarthritis of spine)     cervical spine    Obesity      Past Surgical History:   Procedure Laterality Date    A-V CARDIAC PACEMAKER INSERTION Left 06/13/2016    Dual chamber pacemaker [address] by Dr Leighann Hearn History     Socioeconomic History    Marital status:      Spouse name: Not on file    Number of children: None    Years of education: Not on file    Highest education level: Not on file   Occupational History    Not on file   Tobacco Use    Smoking status: Never Smoker    Smokeless tobacco: Never Used   Substance and Sexual Activity    Alcohol use: No     Frequency: Never     Binge frequency: Never     Comment:      Drug use: No    Sexual activity: Not Currently   Social History Narrative    1 cup tea daily; occ pop; no coffee      No family history on file.     Vaccine Date   COVID-19 12/23/20, 1/13/21   Influenza 10/12/21   Prevnar 13 12/29/2015   Pneumovax 23 1/12/2017 Code Status Full  7/30/21       PHYSICAL EXAM:     Vitals:    05/04/22 1141   BP: 133/64   Pulse: 72   Resp: 20   Temp: 96.9 °F (36.1 °C)   TempSrc: Temporal   SpO2: 96%   Weight: 198 lb (89.8 kg)   Height: 5' 4\" (1.626 m)      Estimated body mass index is 33.99 kg/m² as calculated from the following:    Height as of this encounter: 5' 4\" (1.626 m). Weight as of this encounter: 198 lb (89.8 kg). GEN:  elderly [facility] female patient seated in recliner in NAD. HEAD:  atraumatic, normocephalic. EYES:  EOMI, PERRL, no cataracts, conjunctivae appear normal.  ENT:   Good hearing, EACs without wax, TM's normal, nasal septum midline, no significant congestion, oral cavity without lesions, poor-fair dentition, no dentures. NECK:  fair-good ROM, no palpable masses, no carotid bruits, no JVD. LUNGS:  clear to ausc, no rales, rhonchi or wheezes. HEART:  RRR, no murmurs or gallops. ABD:  soft, non-tender to palp, no palp masses or HSM, normal BS. BACK:  no scoliosis or kyphosis, non-tender to palp. EXTREMITIES: Trace  edema, no ulcerations, varicosities or erythema. No gross deformities. MUSCULOSKELETAL: Good ROM of all joints, non tender to palp and or with movement. SKIN:  No ulcerations or breakdown, rash, ecchymosis, or other lesions. NEURO:  No tremor, motor UEs 5/5 LEs  5/5, sensory normal, able to stand and walk slowly using rollator with mild ataxia. PSYCH:  Pleasant and cooperative. Fluid speech, oriented to person, place and time. No delusional statements. Medications reviewed.   Labs: 3/11/21 GFR 57, Na 130, A1c 6.4  12/9/21 Na 130, GFR 51, A1c 6.3  9/7/21 GFR 63, Na 128, A1c 6.2  5/20/21 HH 11.9/36.4, GFR 47, Na 128, Cl 95, LFT nl, A1c 6.1, TSH 1.151, , Tri 76, HDL 47, LDL 57     ASSESSMENT:  Elderly female has HTN (hypertension), benign; Diabetes mellitus type 2, diet-controlled ([facility] [address] DDD (degenerative disc disease), cervical; Non-rheumatic mitral regurgitation; AI (aortic insufficiency); Pacemaker; Hyperlipidemia LDL goal <100; Acquired hypothyroidism; Syncope and collapse; Hyponatremia; Primary osteoarthritis involving multiple joints; Mild intermittent asthma without complication; History of complete heart block; Depression; Difficulty walking; Chronic fatigue; Overactive bladder; Constipation; Urinary incontinence; and Seasonal allergic rhinitis on their problem list.     Donnie was seen today for hypertension and diabetes. Diagnoses and all orders for this visit:    HTN (hypertension), benign    Diabetes mellitus type 2, diet-controlled ([facility] [address]    Primary osteoarthritis involving multiple joints    Overactive bladder    Continuous leakage of urine    Seasonal allergic rhinitis, unspecified trigger    Constipation, unspecified constipation type          PLAN:  Order written for compression stockings. Encouraged to elevate legs more. Nurse to find out from family if ok with cardiology to use an electric toothbrush. Discussed diet and activity level. Smaller portions at meals - skip breakfast and snacks. Instructed to drink less water. Continue to use BioFreeze on painful hand joints. Continue Tylenol for pain as needed. Encouraged to drink plenty of fluids. Check BMP & A1c every 3 months, next in June with yearly labs. Continue other meds as per Rx List. Recheck 1 month. 40 minutes spent on visit, 25 minutes involved education/counseling regarding HTN, DM, weight, urinary, and DJD disease processes, treatment options, meds and coordination of care. Current Outpatient Medications   Medication Sig Dispense Refill    FLONASE 50 MCG/ACT nasal spray Two sprays to each nostril daily. 1 each 11    amLODIPine (NORVASC) 5 MG tablet TAKE 1 TABLET BY MOUTH AT  BEDTIME. 30 tablet 11    metoprolol succinate (TOPROL XL) 25 MG extended release tablet TAKE 1 TABLET BY MOUTH AT  BEDTIME.  90 tablet 3    spironolactone (ALDACTONE) 25 MG tablet TAKE 1 TABLET BY MOUTH DAILY 30 tablet 11    loratadine (CLARITIN) 10 MG tablet TAKE 1 TABLET BY MOUTH ONCE A DAY 90 tablet 3    polyethylene glycol (GLYCOLAX) 17 GM/SCOOP powder Take 17 g by mouth daily as needed (constipation) 144 g 5    folic acid (FOLVITE) 232 MCG tablet TAKE 1 TABLET BY MOUTH IN  THE MORNING. 30 tablet 11    metoprolol succinate (TOPROL XL) 50 MG extended release tablet TAKE 1 TABLET BY MOUTH DAILY 30 tablet 7    ipratropium (ATROVENT) 0.06 % nasal spray 2 sprays by Each Nostril route 4 times daily as needed for Rhinitis 1 Bottle 5    acetaminophen (ACETAMINOPHEN EXTRA STRENGTH) 500 MG tablet TAKE 1 TABLET BY MOUTH FOUR TIMES DAILY AS NEEDED FOR PAIN. 120 tablet 11    aspirin (ASPIR-LOW) 81 MG EC tablet TAKE 1 TABLET BY MOUTH ONCE DAILY 30 tablet 11    Incontinence Supply Disposable MISC 2 each by Does not apply route as needed (Incontinence) Use prn. Dispense 2 cases. 11 Refills. Size large 2 each 11    vitamin D (CHOLECALCIFEROL) 50 MCG (2000 UT) TABS tablet Take 1 tablet by mouth daily      simvastatin (ZOCOR) 10 MG tablet TAKE 1 TABLET BY MOUTH AT  BEDTIME. 30 tablet 11    calcium carbonate-vitamin D 600-200 MG-UNIT TABS Take 1 tablet by mouth 2 times daily 60 tablet 11    levothyroxine (SYNTHROID) 100 MCG tablet Take 1 tablet by mouth Daily TAKE 1 TABLET BY MOUTH IN THE MORNING 30 MINUTES BEFORE BREAKFAST OR OTHER MEDICATIONS. 90 tablet 3    Omega-3 Fatty Acids (FISH OIL) 1200 MG CAPS TAKE 1 CAPSULE BY MOUTH IN THE MORNING. 30 capsule 11     No current facility-administered medications for this visit. Return in about 1 month (around 6/4/2022) for regular visit. An electronic signature was used to authenticate this note.     --Shannon Arana PA-C on 5/4/2022 at 11:51 AM

## 2022-05-04 ENCOUNTER — OFFICE VISIT (OUTPATIENT)
Dept: PRIMARY CARE CLINIC | Age: 87
End: 2022-05-04
Payer: COMMERCIAL

## 2022-05-04 VITALS
RESPIRATION RATE: 20 BRPM | DIASTOLIC BLOOD PRESSURE: 64 MMHG | OXYGEN SATURATION: 96 % | WEIGHT: 198 LBS | HEIGHT: 64 IN | TEMPERATURE: 96.9 F | BODY MASS INDEX: 33.8 KG/M2 | HEART RATE: 72 BPM | SYSTOLIC BLOOD PRESSURE: 133 MMHG

## 2022-05-04 DIAGNOSIS — M15.9 PRIMARY OSTEOARTHRITIS INVOLVING MULTIPLE JOINTS: ICD-10-CM

## 2022-05-04 DIAGNOSIS — E11.9 DIABETES MELLITUS TYPE 2, DIET-CONTROLLED (HCC): Chronic | ICD-10-CM

## 2022-05-04 DIAGNOSIS — I10 HTN (HYPERTENSION), BENIGN: Primary | Chronic | ICD-10-CM

## 2022-05-04 DIAGNOSIS — J30.2 SEASONAL ALLERGIC RHINITIS, UNSPECIFIED TRIGGER: ICD-10-CM

## 2022-05-04 DIAGNOSIS — N39.45 CONTINUOUS LEAKAGE OF URINE: ICD-10-CM

## 2022-05-04 DIAGNOSIS — N32.81 OVERACTIVE BLADDER: ICD-10-CM

## 2022-05-04 DIAGNOSIS — K59.00 CONSTIPATION, UNSPECIFIED CONSTIPATION TYPE: ICD-10-CM

## 2022-05-04 PROCEDURE — 1090F PRES/ABSN URINE INCON ASSESS: CPT | Performed by: PHYSICIAN ASSISTANT

## 2022-05-04 PROCEDURE — 1123F ACP DISCUSS/DSCN MKR DOCD: CPT | Performed by: PHYSICIAN ASSISTANT

## 2022-05-04 PROCEDURE — 3044F HG A1C LEVEL LT 7.0%: CPT | Performed by: PHYSICIAN ASSISTANT

## 2022-05-04 PROCEDURE — G8417 CALC BMI ABV UP PARAM F/U: HCPCS | Performed by: PHYSICIAN ASSISTANT

## 2022-05-04 PROCEDURE — 0509F URINE INCON PLAN DOCD: CPT | Performed by: PHYSICIAN ASSISTANT

## 2022-05-04 ASSESSMENT — PATIENT HEALTH QUESTIONNAIRE - PHQ9
SUM OF ALL RESPONSES TO PHQ QUESTIONS 1-9: 2
SUM OF ALL RESPONSES TO PHQ9 QUESTIONS 1 & 2: 2
2. FEELING DOWN, DEPRESSED OR HOPELESS: 1
1. LITTLE INTEREST OR PLEASURE IN DOING THINGS: 1

## 2022-06-10 ENCOUNTER — OFFICE VISIT (OUTPATIENT)
Dept: PRIMARY CARE CLINIC | Age: 87
End: 2022-06-10
Payer: COMMERCIAL

## 2022-06-10 VITALS
RESPIRATION RATE: 18 BRPM | WEIGHT: 198 LBS | BODY MASS INDEX: 33.8 KG/M2 | TEMPERATURE: 97.5 F | HEART RATE: 77 BPM | DIASTOLIC BLOOD PRESSURE: 72 MMHG | SYSTOLIC BLOOD PRESSURE: 132 MMHG | HEIGHT: 64 IN | OXYGEN SATURATION: 96 %

## 2022-06-10 DIAGNOSIS — J30.2 SEASONAL ALLERGIC RHINITIS, UNSPECIFIED TRIGGER: ICD-10-CM

## 2022-06-10 DIAGNOSIS — E11.9 DIABETES MELLITUS TYPE 2, DIET-CONTROLLED (HCC): Chronic | ICD-10-CM

## 2022-06-10 DIAGNOSIS — M15.9 PRIMARY OSTEOARTHRITIS INVOLVING MULTIPLE JOINTS: ICD-10-CM

## 2022-06-10 DIAGNOSIS — E78.5 HYPERLIPIDEMIA LDL GOAL <100: Chronic | ICD-10-CM

## 2022-06-10 DIAGNOSIS — E03.9 ACQUIRED HYPOTHYROIDISM: Chronic | ICD-10-CM

## 2022-06-10 DIAGNOSIS — N32.81 OVERACTIVE BLADDER: ICD-10-CM

## 2022-06-10 DIAGNOSIS — I10 HTN (HYPERTENSION), BENIGN: Primary | Chronic | ICD-10-CM

## 2022-06-10 PROCEDURE — 1090F PRES/ABSN URINE INCON ASSESS: CPT | Performed by: PHYSICIAN ASSISTANT

## 2022-06-10 PROCEDURE — 1123F ACP DISCUSS/DSCN MKR DOCD: CPT | Performed by: PHYSICIAN ASSISTANT

## 2022-06-10 PROCEDURE — G8417 CALC BMI ABV UP PARAM F/U: HCPCS | Performed by: PHYSICIAN ASSISTANT

## 2022-06-10 PROCEDURE — 3044F HG A1C LEVEL LT 7.0%: CPT | Performed by: PHYSICIAN ASSISTANT

## 2022-06-10 NOTE — PROGRESS NOTES
6/10/2022    Home visit medically necessary in lieu of an office visit due to: half-way resident, uses walker, difficult to get out. HPI:  Resident states she had some bumps on her left forearm recently which may have been from bed bugs. The bumps are gone now. She says her family and then the facility sprayed her room. Still using name brand Flonase for allergy symptoms. Otherwise, her knees, hands and feet DJD pain is about the same. She continues to use BioFreeze and takes Tylenol for relief. Her legs have no ^ swelling. She elevates her legs as much as possible when she is in her room. She has an order for compression stockings. Her overactive bladder is better than it used to be. She has some urine still leaking out occasionally. She does do her Kegel exercises at times. She wears Depends. She has not had any falls in this month. She has been moving her bowels well. REVIEW OF SYSTEMS:  GENERAL: Appetite good. No weight change, generally healthy, no change in strength or exercise tolerance. CARDIOVASCULAR: No chest pains, no murmurs, no palpitations, no syncope, no orthopnea. LEGS HAVE BEEN SWELLING. RESPIRATORY: No shortness of breath, no pain with breathing, no wheezing, no hemoptysis, no cough. BREASTS:  No lumps, discharge or pain. GASTROINTESTINAL: No change in appetite, no dysphagia, no heartburn, no abdominal pains, no diarrhea, no bowel habit changes, no emesis, no melena, no hemorrhoids. CONSTIPATION SOMETIMES. GENITOURINARY: No incontinence or retention, no urinary urgency, no frequent UTIs, no dysuria, no change in nature of urine. STRESS INCONTINENCE AT TIMES. MUSCULOSKELETAL: No swelling or redness of joints, no limitation of range of motion, no weakness or numbness. HAS HAD JT PAINS BUT NOT NOW. NEURO/PSYCH: No weakness, no tremor, no seizures, no changes in mentation, no ataxia. No depressive symptoms, no changes in sleep habits, no changes in thought content. MEMORY PROBLEMS.  All other systems negative. Allergies   Allergen Reactions    Flomax [Tamsulosin Hcl] Other (See Comments)     Causes chest pain    Ace Inhibitors Swelling     Causes lip swelling    Flomax [Tamsulosin]     Alendronate Sodium Other (See Comments)     Pt unsure of allergies    Lasix [Furosemide] Other (See Comments)     weakness    Lisinopril Rash    Penicillins Rash     Rash    Propolis Other (See Comments)    Vioxx [Rofecoxib] Other (See Comments)     Past Medical History:   Diagnosis Date    Asthma     Atrioventricular (AV) dissociation     AV block, 1st degree 2/11/2015    Diabetes mellitus ([facility] [address]     GERD (gastroesophageal reflux disease)     Herniated disc     Cervical    Hyperlipidemia     Hypertension     Mild aortic regurgitation 11/5/13    Mitral regurgitation 11/5/13    mild-moderate    Nontoxic uninodular goiter     OA (osteoarthritis of spine)     cervical spine    Obesity      Past Surgical History:   Procedure Laterality Date    A-V CARDIAC PACEMAKER INSERTION Left 06/13/2016    Dual chamber pacemaker [address] by Dr Flaquita Matson ([address] [place] [address])       Social History     Socioeconomic History    Marital status:      Spouse name: Not on file    Number of children: None    Years of education: Not on file    Highest education level: Not on file   Occupational History    Not on file   Tobacco Use    Smoking status: Never Smoker    Smokeless tobacco: Never Used   Substance and Sexual Activity    Alcohol use: No     Frequency: Never     Binge frequency: Never     Comment:      Drug use: No    Sexual activity: Not Currently   Social History Narrative    1 cup tea daily; occ pop; no coffee      No family history on file.     Vaccine Date   COVID-19 12/23/20, 1/13/21   Influenza 10/12/21   Prevnar 13 12/29/2015   Pneumovax 23 1/12/2017     Code Status Full  7/30/21       PHYSICAL EXAM:     Vitals:    06/10/22 1111   BP: 132/72   Pulse: 77 Hyponatremia; Primary osteoarthritis involving multiple joints; Mild intermittent asthma without complication; History of complete heart block; Depression; Difficulty walking; Chronic fatigue; Overactive bladder; Constipation; Urinary incontinence; and Seasonal allergic rhinitis on their problem list.     Donnie was seen today for hypertension and skin problem. Diagnoses and all orders for this visit:    HTN (hypertension), benign  -     CBC with Auto Differential; Future  -     Comprehensive Metabolic Panel; Future  -     TSH; Future    Diabetes mellitus type 2, diet-controlled ([facility] [address]  -     CBC with Auto Differential; Future  -     Comprehensive Metabolic Panel; Future  -     Hemoglobin A1C; Future    Primary osteoarthritis involving multiple joints    Overactive bladder    Seasonal allergic rhinitis, unspecified trigger    Acquired hypothyroidism  -     TSH; Future    Hyperlipidemia LDL goal <100  -     LIPID PANEL; Future            PLAN:  Watch for return of bed bugs. Encouraged to elevate legs more and use compression stockings. Discussed diet and activity level. Smaller portions at meals - skip breakfast and snacks. Instructed to drink less water. Continue to use BioFreeze on painful hand joints. Continue Tylenol for pain as needed. Encouraged to drink plenty of fluids. Check yearly labs. Check BMP & A1c every 3 months, next in September. Continue other meds as per Rx List. Recheck 1 month. 40 minutes spent on visit, 25 minutes involved education/counseling regarding HTN, DM, weight, urinary, and DJD disease processes, treatment options, meds and coordination of care. Current Outpatient Medications   Medication Sig Dispense Refill    FLONASE 50 MCG/ACT nasal spray Two sprays to each nostril daily. 1 each 11    amLODIPine (NORVASC) 5 MG tablet TAKE 1 TABLET BY MOUTH AT  BEDTIME. 30 tablet 11    metoprolol succinate (TOPROL XL) 25 MG extended release tablet TAKE 1 TABLET BY MOUTH AT  BEDTIME.  80 tablet 3    spironolactone (ALDACTONE) 25 MG tablet TAKE 1 TABLET BY MOUTH DAILY 30 tablet 11    loratadine (CLARITIN) 10 MG tablet TAKE 1 TABLET BY MOUTH ONCE A DAY 90 tablet 3    polyethylene glycol (GLYCOLAX) 17 GM/SCOOP powder Take 17 g by mouth daily as needed (constipation) 853 g 5    folic acid (FOLVITE) 784 MCG tablet TAKE 1 TABLET BY MOUTH IN  THE MORNING. 30 tablet 11    metoprolol succinate (TOPROL XL) 50 MG extended release tablet TAKE 1 TABLET BY MOUTH DAILY 30 tablet 7    ipratropium (ATROVENT) 0.06 % nasal spray 2 sprays by Each Nostril route 4 times daily as needed for Rhinitis 1 Bottle 5    acetaminophen (ACETAMINOPHEN EXTRA STRENGTH) 500 MG tablet TAKE 1 TABLET BY MOUTH FOUR TIMES DAILY AS NEEDED FOR PAIN. 120 tablet 11    aspirin (ASPIR-LOW) 81 MG EC tablet TAKE 1 TABLET BY MOUTH ONCE DAILY 30 tablet 11    Incontinence Supply Disposable MISC 2 each by Does not apply route as needed (Incontinence) Use prn. Dispense 2 cases. 11 Refills. Size large 2 each 11    vitamin D (CHOLECALCIFEROL) 50 MCG (2000 UT) TABS tablet Take 1 tablet by mouth daily      simvastatin (ZOCOR) 10 MG tablet TAKE 1 TABLET BY MOUTH AT  BEDTIME. 30 tablet 11    calcium carbonate-vitamin D 600-200 MG-UNIT TABS Take 1 tablet by mouth 2 times daily 60 tablet 11    levothyroxine (SYNTHROID) 100 MCG tablet Take 1 tablet by mouth Daily TAKE 1 TABLET BY MOUTH IN THE MORNING 30 MINUTES BEFORE BREAKFAST OR OTHER MEDICATIONS. 90 tablet 3    Omega-3 Fatty Acids (FISH OIL) 1200 MG CAPS TAKE 1 CAPSULE BY MOUTH IN THE MORNING. 30 capsule 11     No current facility-administered medications for this visit. Return in about 1 month (around 7/10/2022) for regular visit. An electronic signature was used to authenticate this note.     --Pradeep Elizondo PA-C on 6/10/2022 at 11:12 AM

## 2022-06-13 LAB
ALBUMIN SERPL-MCNC: 3.6 G/DL
ALP BLD-CCNC: 84 U/L
ALT SERPL-CCNC: 12 U/L
ANION GAP SERPL CALCULATED.3IONS-SCNC: ABNORMAL MMOL/L
AST SERPL-CCNC: 17 U/L
AVERAGE GLUCOSE: 137
BASOPHILS ABSOLUTE: 0.1 /ΜL
BASOPHILS RELATIVE PERCENT: 1 %
BILIRUB SERPL-MCNC: 0.6 MG/DL (ref 0.1–1.4)
BUN BLDV-MCNC: 34 MG/DL
CALCIUM SERPL-MCNC: 9.6 MG/DL
CHLORIDE BLD-SCNC: 95 MMOL/L
CHOLESTEROL, TOTAL: 118 MG/DL
CHOLESTEROL/HDL RATIO: ABNORMAL
CO2: 20 MMOL/L
CREAT SERPL-MCNC: 1.3 MG/DL
EOSINOPHILS ABSOLUTE: 0.2 /ΜL
EOSINOPHILS RELATIVE PERCENT: 3.3 %
GFR CALCULATED: 47
GLUCOSE BLD-MCNC: 74 MG/DL
HBA1C MFR BLD: 6.4 %
HCT VFR BLD CALC: 39.1 % (ref 36–46)
HDLC SERPL-MCNC: 51 MG/DL (ref 35–70)
HEMOGLOBIN: 12.7 G/DL (ref 12–16)
LDL CHOLESTEROL CALCULATED: 50 MG/DL (ref 0–160)
LYMPHOCYTES ABSOLUTE: 2.7 /ΜL
LYMPHOCYTES RELATIVE PERCENT: 38.3 %
MCH RBC QN AUTO: 29.5 PG
MCHC RBC AUTO-ENTMCNC: 32.4 G/DL
MCV RBC AUTO: 91 FL
MONOCYTES ABSOLUTE: 1.1 /ΜL
MONOCYTES RELATIVE PERCENT: 15.2 %
NEUTROPHILS ABSOLUTE: 3 /ΜL
NEUTROPHILS RELATIVE PERCENT: 42.2 %
NONHDLC SERPL-MCNC: ABNORMAL MG/DL
PDW BLD-RTO: 14.6 %
PLATELET # BLD: 326 K/ΜL
PMV BLD AUTO: 7.7 FL
POTASSIUM SERPL-SCNC: 4.8 MMOL/L
RBC # BLD: 4.3 10^6/ΜL
SODIUM BLD-SCNC: 127 MMOL/L
TOTAL PROTEIN: 7.6
TRIGL SERPL-MCNC: 83 MG/DL
TSH SERPL DL<=0.05 MIU/L-ACNC: 1.42 UIU/ML
VLDLC SERPL CALC-MCNC: 17 MG/DL
WBC # BLD: 7.1 10^3/ML

## 2022-06-14 DIAGNOSIS — I10 HTN (HYPERTENSION), BENIGN: Chronic | ICD-10-CM

## 2022-06-14 DIAGNOSIS — E78.5 HYPERLIPIDEMIA LDL GOAL <100: Chronic | ICD-10-CM

## 2022-06-14 DIAGNOSIS — E03.9 ACQUIRED HYPOTHYROIDISM: Chronic | ICD-10-CM

## 2022-06-14 DIAGNOSIS — E11.9 DIABETES MELLITUS TYPE 2, DIET-CONTROLLED (HCC): Chronic | ICD-10-CM

## 2022-07-07 ENCOUNTER — TELEPHONE (OUTPATIENT)
Dept: PRIMARY CARE CLINIC | Age: 87
End: 2022-07-07

## 2022-07-07 ENCOUNTER — OFFICE VISIT (OUTPATIENT)
Dept: PRIMARY CARE CLINIC | Age: 87
End: 2022-07-07
Payer: COMMERCIAL

## 2022-07-07 VITALS
OXYGEN SATURATION: 96 % | RESPIRATION RATE: 20 BRPM | HEART RATE: 68 BPM | TEMPERATURE: 97.3 F | HEIGHT: 64 IN | SYSTOLIC BLOOD PRESSURE: 120 MMHG | BODY MASS INDEX: 33.9 KG/M2 | DIASTOLIC BLOOD PRESSURE: 68 MMHG | WEIGHT: 198.6 LBS

## 2022-07-07 DIAGNOSIS — E87.1 HYPONATREMIA: ICD-10-CM

## 2022-07-07 DIAGNOSIS — J30.2 SEASONAL ALLERGIC RHINITIS, UNSPECIFIED TRIGGER: ICD-10-CM

## 2022-07-07 DIAGNOSIS — I10 HTN (HYPERTENSION), BENIGN: Primary | Chronic | ICD-10-CM

## 2022-07-07 DIAGNOSIS — M15.9 PRIMARY OSTEOARTHRITIS INVOLVING MULTIPLE JOINTS: ICD-10-CM

## 2022-07-07 DIAGNOSIS — N39.45 CONTINUOUS LEAKAGE OF URINE: ICD-10-CM

## 2022-07-07 PROCEDURE — 1090F PRES/ABSN URINE INCON ASSESS: CPT | Performed by: FAMILY MEDICINE

## 2022-07-07 PROCEDURE — 1123F ACP DISCUSS/DSCN MKR DOCD: CPT | Performed by: FAMILY MEDICINE

## 2022-07-07 PROCEDURE — G8417 CALC BMI ABV UP PARAM F/U: HCPCS | Performed by: FAMILY MEDICINE

## 2022-07-07 PROCEDURE — 0509F URINE INCON PLAN DOCD: CPT | Performed by: FAMILY MEDICINE

## 2022-07-07 NOTE — TELEPHONE ENCOUNTER
----- Message from Dina Jain MD sent at 7/7/2022 11:11 AM EDT -----  Plesae send order - Get BMP. Dx Hyponatremia.

## 2022-07-07 NOTE — PROGRESS NOTES
 Alendronate Sodium Other (See Comments)     Pt unsure of allergies    Lasix [Furosemide] Other (See Comments)     weakness    Lisinopril Rash    Penicillins Rash     Rash    Propolis Other (See Comments)    Vioxx [Rofecoxib] Other (See Comments)     Past Medical History:   Diagnosis Date    Asthma     Atrioventricular (AV) dissociation     AV block, 1st degree 2/11/2015    Diabetes mellitus ([address]     GERD (gastroesophageal reflux disease)     Herniated disc     Cervical    Hyperlipidemia     Hypertension     Mild aortic regurgitation 11/5/13    Mitral regurgitation 11/5/13    mild-moderate    Nontoxic uninodular goiter     OA (osteoarthritis of spine)     cervical spine    Obesity      Past Surgical History:   Procedure Laterality Date    A-V CARDIAC PACEMAKER INSERTION Left 06/13/2016    Dual chamber pacemaker [address] by Dr Sergei Rothman ([address] [place] [address])       Social History     Socioeconomic History    Marital status:      Spouse name: Not on file    Number of children: None    Years of education: Not on file    Highest education level: Not on file   Occupational History    Not on file   Tobacco Use    Smoking status: Never Smoker    Smokeless tobacco: Never Used   Substance and Sexual Activity    Alcohol use: No     Frequency: Never     Binge frequency: Never     Comment:      Drug use: No    Sexual activity: Not Currently   Social History Narrative    1 cup tea daily; occ pop; no coffee      No family history on file.     Vaccine Date   COVID-19 12/23/20, 1/13/21   Influenza 10/12/21   Prevnar 13 12/29/2015   Pneumovax 23 1/12/2017     Code Status Full  7/30/21     PHYSICAL EXAM:     Vitals:    07/07/22 1034   BP: 120/68   Site: Right Upper Arm   Position: Sitting   Pulse: 68   Resp: 20   Temp: 97.3 °F (36.3 °C)   TempSrc: Infrared   SpO2: 96%   Weight: 198 lb 9.6 oz (90.1 kg)   Height: 5' 4\" (1.626 m)      Estimated body mass index is 34.09 kg/m² as calculated from the following:    Height as of this encounter: 5' 4\" (1.626 m). Weight as of this encounter: 198 lb 9.6 oz (90.1 kg). GEN:  elderly WDWN female patient seated in recliner in NAD. HEAD:  atraumatic, normocephalic. EYES:  EOMI, PERRL, no cataracts, conjunctivae appear normal.  ENT:   Good hearing, EACs without wax, TM's normal, nasal septum midline, no significant congestion, oral cavity without lesions, poor-fair dentition, no dentures. NECK:  fair-good ROM, no palpable masses, no carotid bruits, no JVD. LUNGS:  clear to ausc, no rales, rhonchi or wheezes. HEART:  RRR, no murmurs or gallops. ABD:  soft, non-tender to palp, no palp masses or HSM, normal BS. BACK:  no scoliosis or kyphosis, non-tender to palp. EXTREMITIES: Trace edema, no ulcerations, varicosities or erythema. No gross deformities. MUSCULOSKELETAL: Good ROM of all joints, non tender to palp and or with movement. SKIN:  No ulcerations or breakdown, rash, ecchymosis, or other lesions. NEURO:  No tremor, motor UEs 5/5 LEs  5/5, sensory normal, able to stand and walk slowly using rollator with mild ataxia. PSYCH:  Pleasant and cooperative. Fluid speech, oriented to person, place and time. No delusional statements. Medications reviewed. Labs: 6/13/22 HH 13/39, Na 127, GFR 47, LFTs nl, TSH 1.4, , HDL 51, A1c 6.4  3/11/21 GFR 57, Na 130, A1c 6.4  12/9/21 Na 130, GFR 51, A1c 6.3  9/7/21 GFR 63, Na 128, A1c 6.2    ASSESSMENT:  Elderly female has HTN (hypertension), benign; Diabetes mellitus type 2, diet-controlled ([address] DDD (degenerative disc disease), cervical; Non-rheumatic mitral regurgitation; AI (aortic insufficiency); Pacemaker; Hyperlipidemia LDL goal <100; Acquired hypothyroidism; Syncope and collapse; Hyponatremia; Primary osteoarthritis involving multiple joints; Mild intermittent asthma without complication; History of complete heart block; Depression;  Difficulty walking; Chronic Bottle 5    acetaminophen (ACETAMINOPHEN EXTRA STRENGTH) 500 MG tablet TAKE 1 TABLET BY MOUTH FOUR TIMES DAILY AS NEEDED FOR PAIN. 120 tablet 11    aspirin (ASPIR-LOW) 81 MG EC tablet TAKE 1 TABLET BY MOUTH ONCE DAILY 30 tablet 11    Incontinence Supply Disposable MISC 2 each by Does not apply route as needed (Incontinence) Use prn. Dispense 2 cases. 11 Refills. Size large 2 each 11    vitamin D (CHOLECALCIFEROL) 50 MCG (2000 UT) TABS tablet Take 1 tablet by mouth daily      simvastatin (ZOCOR) 10 MG tablet TAKE 1 TABLET BY MOUTH AT  BEDTIME. 30 tablet 11    calcium carbonate-vitamin D 600-200 MG-UNIT TABS Take 1 tablet by mouth 2 times daily 60 tablet 11    levothyroxine (SYNTHROID) 100 MCG tablet Take 1 tablet by mouth Daily TAKE 1 TABLET BY MOUTH IN THE MORNING 30 MINUTES BEFORE BREAKFAST OR OTHER MEDICATIONS. 90 tablet 3    Omega-3 Fatty Acids (FISH OIL) 1200 MG CAPS TAKE 1 CAPSULE BY MOUTH IN THE MORNING. 30 capsule 11     No current facility-administered medications for this visit. Return in about 1 month (around 8/7/2022). An electronic signature was used to authenticate this note.     --Nafisa Martínez MD on 7/7/2022 at 11:11 AM

## 2022-07-08 LAB
BUN BLDV-MCNC: 31 MG/DL
CALCIUM SERPL-MCNC: 9.5 MG/DL
CHLORIDE BLD-SCNC: 97 MMOL/L
CO2: 22 MMOL/L
CREAT SERPL-MCNC: 1.3 MG/DL
GFR CALCULATED: 47
GLUCOSE BLD-MCNC: 79 MG/DL
POTASSIUM SERPL-SCNC: 4.4 MMOL/L
SODIUM BLD-SCNC: 129 MMOL/L

## 2022-07-11 ENCOUNTER — TELEPHONE (OUTPATIENT)
Dept: PRIMARY CARE CLINIC | Age: 87
End: 2022-07-11

## 2022-07-11 DIAGNOSIS — R26.2 DIFFICULTY WALKING: Primary | ICD-10-CM

## 2022-07-11 NOTE — TELEPHONE ENCOUNTER
Donnie's niece called. She needs a script for a new handicapped placard for when she takes Donnie to [facility]. Could you please leave a script at the office, and I will mail it to her. Thank you.

## 2022-07-26 ENCOUNTER — TELEPHONE (OUTPATIENT)
Dept: PRIMARY CARE CLINIC | Age: 87
End: 2022-07-26

## 2022-07-26 NOTE — TELEPHONE ENCOUNTER
Jason Lucio called stating the [facility] would not take the script written for a handicap placard because it does not have a duration or expiration date. Jason Lucio would like us to mail her a new script with that information on it. I will leave a reminder on your desk and mail it to Jason Lucio.   Eric Mckeon [address] [place] [address], [address] [place] [address]

## 2022-08-03 ENCOUNTER — OFFICE VISIT (OUTPATIENT)
Dept: PRIMARY CARE CLINIC | Age: 87
End: 2022-08-03
Payer: COMMERCIAL

## 2022-08-03 VITALS
HEART RATE: 65 BPM | WEIGHT: 197.8 LBS | DIASTOLIC BLOOD PRESSURE: 76 MMHG | BODY MASS INDEX: 33.77 KG/M2 | OXYGEN SATURATION: 96 % | SYSTOLIC BLOOD PRESSURE: 136 MMHG | HEIGHT: 64 IN | RESPIRATION RATE: 20 BRPM | TEMPERATURE: 97.3 F

## 2022-08-03 DIAGNOSIS — I10 HTN (HYPERTENSION), BENIGN: Primary | Chronic | ICD-10-CM

## 2022-08-03 DIAGNOSIS — E87.1 HYPONATREMIA: ICD-10-CM

## 2022-08-03 DIAGNOSIS — M15.9 PRIMARY OSTEOARTHRITIS INVOLVING MULTIPLE JOINTS: ICD-10-CM

## 2022-08-03 DIAGNOSIS — J30.2 SEASONAL ALLERGIC RHINITIS, UNSPECIFIED TRIGGER: ICD-10-CM

## 2022-08-03 DIAGNOSIS — I10 HTN (HYPERTENSION), BENIGN: Chronic | ICD-10-CM

## 2022-08-03 DIAGNOSIS — E11.9 DIABETES MELLITUS TYPE 2, DIET-CONTROLLED (HCC): Chronic | ICD-10-CM

## 2022-08-03 PROCEDURE — 3044F HG A1C LEVEL LT 7.0%: CPT | Performed by: FAMILY MEDICINE

## 2022-08-03 PROCEDURE — 1123F ACP DISCUSS/DSCN MKR DOCD: CPT | Performed by: FAMILY MEDICINE

## 2022-08-03 PROCEDURE — G8417 CALC BMI ABV UP PARAM F/U: HCPCS | Performed by: FAMILY MEDICINE

## 2022-08-03 PROCEDURE — 1090F PRES/ABSN URINE INCON ASSESS: CPT | Performed by: FAMILY MEDICINE

## 2022-08-03 NOTE — PROGRESS NOTES
8/3/2022    Home visit medically necessary in lieu of an office visit due to: long term resident, uses walker, difficult to get out. HPI:  Patient says she is doing pretty good. She had tooth extraction done recently that was very painful. She uses Flonase for allergy symptoms which is effective. Her knees, hands and feet DJD pain is not bothering her much. She uses BioFreeze and takes Tylenol for relief. Her legs have not had much swelling. She elevates her legs as much as possible when she is in her room. Her overactive bladder is better than it used to be. She has some urine still leaking out occasionally. She does Kegel exercises at times. She wears Depends. She has been moving her bowels well. She has had no falls in this month. REVIEW OF SYSTEMS:  GENERAL: Appetite good. No weight change, generally healthy, no change in strength or exercise tolerance. CARDIOVASCULAR: No chest pains, no murmurs, no palpitations, no syncope, no orthopnea. LEGS HAVE BEEN SWELLING. RESPIRATORY: No shortness of breath, no pain with breathing, no wheezing, no hemoptysis, no cough. BREASTS:  No lumps, discharge or pain. GASTROINTESTINAL: No change in appetite, no dysphagia, no heartburn, no abdominal pains, no diarrhea, no bowel habit changes, no emesis, no melena, no hemorrhoids. CONSTIPATION SOMETIMES. GENITOURINARY: No incontinence or retention, no urinary urgency, no frequent UTIs, no dysuria, no change in nature of urine. STRESS INCONTINENCE AT TIMES. MUSCULOSKELETAL: No swelling or redness of joints, no limitation of range of motion, no weakness or numbness. HAS HAD JT PAINS BUT NOT NOW. NEURO/PSYCH: No weakness, no tremor, no seizures, no changes in mentation, no ataxia. No depressive symptoms, no changes in sleep habits, no changes in thought content. MEMORY PROBLEMS. All other systems negative.     Allergies   Allergen Reactions    Flomax [Tamsulosin Hcl] Other (See Comments)     Causes chest pain    Ace Inhibitors Swelling     Causes lip swelling    Flomax [Tamsulosin]     Alendronate Sodium Other (See Comments)     Pt unsure of allergies    Lasix [Furosemide] Other (See Comments)     weakness    Lisinopril Rash    Penicillins Rash     Rash    [employer] Other (See Comments)    Vioxx [Rofecoxib] Other (See Comments)     Past Medical History:   Diagnosis Date    Asthma     Atrioventricular (AV) dissociation     AV block, 1st degree 2/11/2015    Diabetes mellitus ([facility])     GERD (gastroesophageal reflux disease)     Herniated disc     Cervical    Hyperlipidemia     Hypertension     Mild aortic regurgitation 11/5/13    Mitral regurgitation 11/5/13    mild-moderate    Nontoxic uninodular goiter     OA (osteoarthritis of spine)     cervical spine    Obesity      Past Surgical History:   Procedure Laterality Date    A-V CARDIAC PACEMAKER INSERTION Left 06/13/2016    Dual chamber pacemaker [address] by Dr Keena Fernandez ([address] [place] [address])       Social History     Socioeconomic History    Marital status:      Spouse name: Not on file    Number of children: None    Years of education: Not on file    Highest education level: Not on file   Occupational History    Not on file   Tobacco Use    Smoking status: Never Smoker    Smokeless tobacco: Never Used   Substance and Sexual Activity    Alcohol use: No     Frequency: Never     Binge frequency: Never     Comment:      Drug use: No    Sexual activity: Not Currently   Social History Narrative    1 cup tea daily; occ pop; no coffee      No family history on file.     Vaccine Date   COVID-19 12/23/20, 1/13/21   Influenza 10/12/21   Prevnar 13 12/29/2015   Pneumovax 23 1/12/2017     Code Status Full  7/30/21     PHYSICAL EXAM:     Vitals:    08/03/22 0853   BP: 136/76   Site: Right Upper Arm   Position: Sitting   Pulse: 65   Resp: 20   Temp: 97.3 °F (36.3 °C)   TempSrc: Infrared   SpO2: 96%   Weight: 197 lb 12.8 oz (89.7 kg)   Height: 5' 4\" (1.626 m) Estimated body mass index is 33.95 kg/m² as calculated from the following:    Height as of this encounter: 5' 4\" (1.626 m). Weight as of this encounter: 197 lb 12.8 oz (89.7 kg). GEN:  elderly WDWN female patient seated in recliner in NAD. HEAD:  atraumatic, normocephalic. EYES:  EOMI, PERRL, no cataracts, conjunctivae appear normal.  ENT:   Good hearing, EACs without wax, TM's normal, nasal septum midline, no significant congestion, oral cavity without lesions, poor-fair dentition, no dentures. NECK:  fair-good ROM, no palpable masses, no carotid bruits, no JVD. LUNGS:  clear to ausc, no rales, rhonchi or wheezes. HEART:  RRR, no murmurs or gallops. ABD:  soft, non-tender to palp, no palp masses or HSM, normal BS. BACK:  no scoliosis or kyphosis, non-tender to palp. EXTREMITIES: Trace edema, no ulcerations, varicosities or erythema. No gross deformities. MUSCULOSKELETAL: Good ROM of all joints, non tender to palp and or with movement. SKIN:  No ulcerations or breakdown, rash, ecchymosis, or other lesions. NEURO:  No tremor, motor UEs 5/5 LEs  5/5, sensory normal, able to stand and walk slowly using rollator with mild ataxia. PSYCH:  Pleasant and cooperative. Fluid speech, oriented to person, place and time. No delusional statements. Medications reviewed. Labs: 7/8/22 Na 129, GFR 47  6/13/22 HH 13/39, Na 127, GFR 47, LFTs nl, TSH 1.4, , HDL 51, A1c 6.4  3/11/21 GFR 57, Na 130, A1c 6.4  12/9/21 Na 130, GFR 51, A1c 6.3  9/7/21 GFR 63, Na 128, A1c 6.2    ASSESSMENT:  Elderly female has HTN (hypertension), benign; Diabetes mellitus type 2, diet-controlled ([facility] [address] DDD (degenerative disc disease), cervical; Non-rheumatic mitral regurgitation; AI (aortic insufficiency); Pacemaker; Hyperlipidemia LDL goal <100; Acquired hypothyroidism; Syncope and collapse; Hyponatremia; Primary osteoarthritis involving multiple joints; Mild intermittent asthma without complication;  History of complete heart block; Depression; Difficulty walking; Chronic fatigue; Overactive bladder; Constipation; Urinary incontinence; and Seasonal allergic rhinitis on their problem list.     Donnie was seen today for joint pain. Diagnoses and all orders for this visit:    HTN (hypertension), benign    Seasonal allergic rhinitis, unspecified trigger    Diabetes mellitus type 2, diet-controlled ([facility] [address]    Primary osteoarthritis involving multiple joints    Hyponatremia      PLAN:    Encouraged to elevate legs more and use compression stockings. Discussed diet and activity level. Continue to use BioFreeze on painful hand joints. Continue Tylenol for pain as needed. Encouraged to drink plenty of fluids. Check BMP & A1c every 3 months, next in September. Continue other meds as per Rx List. Recheck 1 month. 40 minutes spent on visit, 25 minutes involved education/counseling regarding HTN, DM, weight, urinary, and DJD disease processes, treatment options, meds and coordination of care. Current Outpatient Medications   Medication Sig Dispense Refill    FLONASE 50 MCG/ACT nasal spray Two sprays to each nostril daily. 1 each 11    amLODIPine (NORVASC) 5 MG tablet TAKE 1 TABLET BY MOUTH AT  BEDTIME. 30 tablet 11    metoprolol succinate (TOPROL XL) 25 MG extended release tablet TAKE 1 TABLET BY MOUTH AT  BEDTIME. 90 tablet 3    spironolactone (ALDACTONE) 25 MG tablet TAKE 1 TABLET BY MOUTH DAILY 30 tablet 11    loratadine (CLARITIN) 10 MG tablet TAKE 1 TABLET BY MOUTH ONCE A DAY 90 tablet 3    polyethylene glycol (GLYCOLAX) 17 GM/SCOOP powder Take 17 g by mouth daily as needed (constipation) 171 g 5    folic acid (FOLVITE) 070 MCG tablet TAKE 1 TABLET BY MOUTH IN  THE MORNING.  30 tablet 11    metoprolol succinate (TOPROL XL) 50 MG extended release tablet TAKE 1 TABLET BY MOUTH DAILY 30 tablet 7    ipratropium (ATROVENT) 0.06 % nasal spray 2 sprays by Each Nostril route 4 times daily as needed for Rhinitis 1 Bottle 5 acetaminophen (ACETAMINOPHEN EXTRA STRENGTH) 500 MG tablet TAKE 1 TABLET BY MOUTH FOUR TIMES DAILY AS NEEDED FOR PAIN. 120 tablet 11    aspirin (ASPIR-LOW) 81 MG EC tablet TAKE 1 TABLET BY MOUTH ONCE DAILY 30 tablet 11    Incontinence Supply Disposable MISC 2 each by Does not apply route as needed (Incontinence) Use prn. Dispense 2 cases. 11 Refills. Size large 2 each 11    vitamin D (CHOLECALCIFEROL) 50 MCG (2000 UT) TABS tablet Take 1 tablet by mouth daily      simvastatin (ZOCOR) 10 MG tablet TAKE 1 TABLET BY MOUTH AT  BEDTIME. 30 tablet 11    calcium carbonate-vitamin D 600-200 MG-UNIT TABS Take 1 tablet by mouth 2 times daily 60 tablet 11    levothyroxine (SYNTHROID) 100 MCG tablet Take 1 tablet by mouth Daily TAKE 1 TABLET BY MOUTH IN THE MORNING 30 MINUTES BEFORE BREAKFAST OR OTHER MEDICATIONS. 90 tablet 3    Omega-3 Fatty Acids (FISH OIL) 1200 MG CAPS TAKE 1 CAPSULE BY MOUTH IN THE MORNING. 30 capsule 11     No current facility-administered medications for this visit. Return in about 1 month (around 9/3/2022). An electronic signature was used to authenticate this note.     --Augustine Espinoza MD on 8/3/2022 at 9:43 AM

## 2022-08-30 NOTE — PROGRESS NOTES
8/31/2022    Home visit medically necessary in lieu of an office visit due to: intermediate resident, uses walker, difficult to get out. HPI:  Patient says she is doing okay. She admits that she has been feeling depressed and down recently and not wanting to leave her room. Her allergies have been acting up but she gets relief with Flonase. Her knees, hands and feet DJD pain are not bothering her much. She uses BioFreeze when needed and takes Tylenol also. Her legs have not had much swelling. She elevates her legs as much as possible when she is in her room. Her overactive bladder is bothering her. She does Kegel exercises sometimes. She has some urine still leaking out occasionally. She wears Depends. She has been moving her bowels well. She has had no falls in this month. REVIEW OF SYSTEMS:  GENERAL: Appetite good. No weight change, generally healthy, no change in strength or exercise tolerance. CARDIOVASCULAR: No chest pains, no murmurs, no palpitations, no syncope, no orthopnea. LEGS HAVE BEEN SWELLING. RESPIRATORY: No shortness of breath, no pain with breathing, no wheezing, no hemoptysis, no cough. BREASTS:  No lumps, discharge or pain. GASTROINTESTINAL: No change in appetite, no dysphagia, no heartburn, no abdominal pains, no diarrhea, no bowel habit changes, no emesis, no melena, no hemorrhoids. CONSTIPATION SOMETIMES. GENITOURINARY: No incontinence or retention, no urinary urgency, no frequent UTIs, no dysuria, no change in nature of urine. STRESS INCONTINENCE AT TIMES. MUSCULOSKELETAL: No swelling or redness of joints, no limitation of range of motion, no weakness or numbness. HAS HAD JT PAINS BUT NOT NOW. NEURO/PSYCH: No weakness, no tremor, no seizures, no changes in mentation, no ataxia. No depressive symptoms, no changes in sleep habits, no changes in thought content. MEMORY PROBLEMS. All other systems negative.     Allergies   Allergen Reactions    Flomax [Tamsulosin Hcl] Other (See Comments) Causes chest pain    Ace Inhibitors Swelling     Causes lip swelling    Flomax [Tamsulosin]     Alendronate Sodium Other (See Comments)     Pt unsure of allergies    Lasix [Furosemide] Other (See Comments)     weakness    Lisinopril Rash    Penicillins Rash     Rash    Propolis Other (See Comments)    Vioxx [Rofecoxib] Other (See Comments)     Past Medical History:   Diagnosis Date    Asthma     Atrioventricular (AV) dissociation     AV block, 1st degree 2/11/2015    Diabetes mellitus (HCC)     GERD (gastroesophageal reflux disease)     Herniated disc     Cervical    Hyperlipidemia     Hypertension     Mild aortic regurgitation 11/5/13    Mitral regurgitation 11/5/13    mild-moderate    Nontoxic uninodular goiter     OA (osteoarthritis of spine)     cervical spine    Obesity      Past Surgical History:   Procedure Laterality Date    A-V CARDIAC PACEMAKER INSERTION Left 06/13/2016    Dual chamber pacemaker [address] by Dr Shobha Wilson ([address] [place] [address])       Social History     Socioeconomic History    Marital status:      Spouse name: Not on file    Number of children: None    Years of education: Not on file    Highest education level: Not on file   Occupational History    Not on file   Tobacco Use    Smoking status: Never Smoker    Smokeless tobacco: Never Used   Substance and Sexual Activity    Alcohol use: No     Frequency: Never     Binge frequency: Never     Comment:      Drug use: No    Sexual activity: Not Currently   Social History Narrative    1 cup tea daily; occ pop; no coffee      No family history on file.     Vaccine Date   COVID-19 12/23/20, 1/13/21   Influenza 10/12/21   Prevnar 13 12/29/2015   Pneumovax 23 1/12/2017     Code Status Full  7/30/21     PHYSICAL EXAM:     Vitals:    08/31/22 0932   BP: 114/64   Site: Right Upper Arm   Position: Sitting   Pulse: 64   Resp: 22   Temp: 97.3 °F (36.3 °C)   TempSrc: Infrared   Weight: 197 lb 12.8 oz (89.7 kg)   Height: 5' 4\" (1.626 m)        Estimated body mass index is 33.95 kg/m² as calculated from the following:    Height as of this encounter: 5' 4\" (1.626 m). Weight as of this encounter: 197 lb 12.8 oz (89.7 kg). GEN:  elderly WDWN female patient seated in recliner in NAD. HEAD:  atraumatic, normocephalic. EYES:  EOMI, PERRL, no cataracts, conjunctivae appear normal.  ENT:   Good hearing, EACs without wax, TM's normal, nasal septum midline, no significant congestion, oral cavity without lesions, poor-fair dentition, no dentures. NECK:  fair-good ROM, no palpable masses, no carotid bruits, no JVD. LUNGS:  clear to ausc, no rales, rhonchi or wheezes. HEART:  RRR, no murmurs or gallops. ABD:  soft, non-tender to palp, no palp masses or HSM, normal BS. BACK:  no scoliosis or kyphosis, non-tender to palp. EXTREMITIES: Trace edema, no ulcerations, varicosities or erythema. No gross deformities. MUSCULOSKELETAL: Good ROM of all joints, non tender to palp and or with movement. SKIN:  No ulcerations or breakdown, rash, ecchymosis, or other lesions. NEURO:  No tremor, motor UEs 5/5 LEs  5/5, sensory normal, able to stand and walk slowly using rollator with mild ataxia. PSYCH:  Pleasant and cooperative. Fluid speech, oriented to person, place and time. No delusional statements. Medications reviewed. Labs: 7/8/22 Na 129, GFR 47  6/13/22 HH 13/39, Na 127, GFR 47, LFTs nl, TSH 1.4, , HDL 51, A1c 6.4  3/11/21 GFR 57, Na 130, A1c 6.4  12/9/21 Na 130, GFR 51, A1c 6.3  9/7/21 GFR 63, Na 128, A1c 6.2    ASSESSMENT:  Elderly female has HTN (hypertension), benign; Diabetes mellitus type 2, diet-controlled ([address] DDD (degenerative disc disease), cervical; Non-rheumatic mitral regurgitation; AI (aortic insufficiency); Pacemaker; Hyperlipidemia LDL goal <100; Acquired hypothyroidism; Syncope and collapse;  Hyponatremia; Primary osteoarthritis involving multiple joints; Mild intermittent asthma without complication; History of complete heart block; Depression; Difficulty walking; Chronic fatigue; Overactive bladder; Constipation; Urinary incontinence; and Seasonal allergic rhinitis on their problem list.     Donnie was seen today for depression, urinary frequency and hypertension. Diagnoses and all orders for this visit:    HTN (hypertension), benign    Diabetes mellitus type 2, diet-controlled ([facility] [address]    Pacemaker    Primary osteoarthritis involving multiple joints    Overactive bladder    Constipation, unspecified constipation type    Seasonal allergic rhinitis, unspecified trigger    Chronic fatigue    Other depression    Other orders  -     escitalopram (LEXAPRO) 5 MG tablet; Take 1 tablet by mouth daily      PLAN:    Start Lexapro for depressed mood. Encouraged to elevate legs and use compression stockings. Discussed diet and activity level. Continue to use BioFreeze on painful hand joints. Continue Tylenol for pain as needed. Encouraged to drink plenty of fluids. Check BMP & A1c every 3 months, next in October. Continue other meds as per Rx List. Recheck 1 month. 40 minutes spent on visit, 25 minutes involved education/counseling regarding HTN, DM, weight, urinary, and DJD disease processes, treatment options, meds and coordination of care. Current Outpatient Medications   Medication Sig Dispense Refill    escitalopram (LEXAPRO) 5 MG tablet Take 1 tablet by mouth daily 30 tablet 5    FLONASE 50 MCG/ACT nasal spray Two sprays to each nostril daily. 1 each 11    amLODIPine (NORVASC) 5 MG tablet TAKE 1 TABLET BY MOUTH AT  BEDTIME. 30 tablet 11    metoprolol succinate (TOPROL XL) 25 MG extended release tablet TAKE 1 TABLET BY MOUTH AT  BEDTIME.  90 tablet 3    spironolactone (ALDACTONE) 25 MG tablet TAKE 1 TABLET BY MOUTH DAILY 30 tablet 11    loratadine (CLARITIN) 10 MG tablet TAKE 1 TABLET BY MOUTH ONCE A DAY 90 tablet 3    polyethylene glycol (GLYCOLAX) 17 GM/SCOOP powder Take 17 g by mouth daily as needed (constipation) 151 g 5    folic acid (FOLVITE) 837 MCG tablet TAKE 1 TABLET BY MOUTH IN  THE MORNING. 30 tablet 11    metoprolol succinate (TOPROL XL) 50 MG extended release tablet TAKE 1 TABLET BY MOUTH DAILY 30 tablet 7    ipratropium (ATROVENT) 0.06 % nasal spray 2 sprays by Each Nostril route 4 times daily as needed for Rhinitis 1 Bottle 5    acetaminophen (ACETAMINOPHEN EXTRA STRENGTH) 500 MG tablet TAKE 1 TABLET BY MOUTH FOUR TIMES DAILY AS NEEDED FOR PAIN. 120 tablet 11    aspirin (ASPIR-LOW) 81 MG EC tablet TAKE 1 TABLET BY MOUTH ONCE DAILY 30 tablet 11    Incontinence Supply Disposable MISC 2 each by Does not apply route as needed (Incontinence) Use prn. Dispense 2 cases. 11 Refills. Size large 2 each 11    vitamin D (CHOLECALCIFEROL) 50 MCG (2000 UT) TABS tablet Take 1 tablet by mouth daily      simvastatin (ZOCOR) 10 MG tablet TAKE 1 TABLET BY MOUTH AT  BEDTIME. 30 tablet 11    calcium carbonate-vitamin D 600-200 MG-UNIT TABS Take 1 tablet by mouth 2 times daily 60 tablet 11    levothyroxine (SYNTHROID) 100 MCG tablet Take 1 tablet by mouth Daily TAKE 1 TABLET BY MOUTH IN THE MORNING 30 MINUTES BEFORE BREAKFAST OR OTHER MEDICATIONS. 90 tablet 3    Omega-3 Fatty Acids (FISH OIL) 1200 MG CAPS TAKE 1 CAPSULE BY MOUTH IN THE MORNING. 30 capsule 11     No current facility-administered medications for this visit. Return in about 1 month (around 9/30/2022). An electronic signature was used to authenticate this note.     --Juliana Sánchez MD on 8/31/2022 at 10:12 AM

## 2022-08-31 ENCOUNTER — OFFICE VISIT (OUTPATIENT)
Dept: PRIMARY CARE CLINIC | Age: 87
End: 2022-08-31
Payer: COMMERCIAL

## 2022-08-31 VITALS
BODY MASS INDEX: 33.77 KG/M2 | TEMPERATURE: 97.3 F | RESPIRATION RATE: 22 BRPM | SYSTOLIC BLOOD PRESSURE: 114 MMHG | DIASTOLIC BLOOD PRESSURE: 64 MMHG | HEIGHT: 64 IN | HEART RATE: 64 BPM | WEIGHT: 197.8 LBS

## 2022-08-31 VITALS
WEIGHT: 197.8 LBS | OXYGEN SATURATION: 98 % | HEIGHT: 64 IN | RESPIRATION RATE: 22 BRPM | DIASTOLIC BLOOD PRESSURE: 64 MMHG | SYSTOLIC BLOOD PRESSURE: 114 MMHG | TEMPERATURE: 97.3 F | BODY MASS INDEX: 33.77 KG/M2 | HEART RATE: 64 BPM

## 2022-08-31 DIAGNOSIS — K59.00 CONSTIPATION, UNSPECIFIED CONSTIPATION TYPE: ICD-10-CM

## 2022-08-31 DIAGNOSIS — Z95.0 PACEMAKER: Chronic | ICD-10-CM

## 2022-08-31 DIAGNOSIS — R53.82 CHRONIC FATIGUE: ICD-10-CM

## 2022-08-31 DIAGNOSIS — J30.2 SEASONAL ALLERGIC RHINITIS, UNSPECIFIED TRIGGER: ICD-10-CM

## 2022-08-31 DIAGNOSIS — N32.81 OVERACTIVE BLADDER: ICD-10-CM

## 2022-08-31 DIAGNOSIS — R26.2 DIFFICULTY WALKING: ICD-10-CM

## 2022-08-31 DIAGNOSIS — M50.30 DDD (DEGENERATIVE DISC DISEASE), CERVICAL: ICD-10-CM

## 2022-08-31 DIAGNOSIS — E11.9 DIABETES MELLITUS TYPE 2, DIET-CONTROLLED (HCC): Chronic | ICD-10-CM

## 2022-08-31 DIAGNOSIS — E03.9 ACQUIRED HYPOTHYROIDISM: Chronic | ICD-10-CM

## 2022-08-31 DIAGNOSIS — I34.0 NON-RHEUMATIC MITRAL REGURGITATION: ICD-10-CM

## 2022-08-31 DIAGNOSIS — I10 HTN (HYPERTENSION), BENIGN: Primary | Chronic | ICD-10-CM

## 2022-08-31 DIAGNOSIS — F32.89 OTHER DEPRESSION: ICD-10-CM

## 2022-08-31 DIAGNOSIS — I35.1 NONRHEUMATIC AORTIC VALVE INSUFFICIENCY: ICD-10-CM

## 2022-08-31 DIAGNOSIS — I10 HTN (HYPERTENSION), BENIGN: Chronic | ICD-10-CM

## 2022-08-31 DIAGNOSIS — Z00.00 ENCOUNTER FOR ANNUAL WELLNESS VISIT (AWV) IN MEDICARE PATIENT: Primary | ICD-10-CM

## 2022-08-31 DIAGNOSIS — Z86.79 HISTORY OF COMPLETE HEART BLOCK: ICD-10-CM

## 2022-08-31 DIAGNOSIS — M15.9 PRIMARY OSTEOARTHRITIS INVOLVING MULTIPLE JOINTS: ICD-10-CM

## 2022-08-31 DIAGNOSIS — E78.5 HYPERLIPIDEMIA LDL GOAL <100: Chronic | ICD-10-CM

## 2022-08-31 DIAGNOSIS — J45.20 MILD INTERMITTENT ASTHMA WITHOUT COMPLICATION: ICD-10-CM

## 2022-08-31 PROCEDURE — 3044F HG A1C LEVEL LT 7.0%: CPT | Performed by: FAMILY MEDICINE

## 2022-08-31 PROCEDURE — G0439 PPPS, SUBSEQ VISIT: HCPCS | Performed by: FAMILY MEDICINE

## 2022-08-31 PROCEDURE — 1090F PRES/ABSN URINE INCON ASSESS: CPT | Performed by: FAMILY MEDICINE

## 2022-08-31 PROCEDURE — 1123F ACP DISCUSS/DSCN MKR DOCD: CPT | Performed by: FAMILY MEDICINE

## 2022-08-31 PROCEDURE — G8417 CALC BMI ABV UP PARAM F/U: HCPCS | Performed by: FAMILY MEDICINE

## 2022-08-31 RX ORDER — ESCITALOPRAM OXALATE 5 MG/1
5 TABLET ORAL DAILY
Qty: 30 TABLET | Refills: 5 | Status: SHIPPED | OUTPATIENT
Start: 2022-08-31

## 2022-08-31 SDOH — ECONOMIC STABILITY: FOOD INSECURITY: WITHIN THE PAST 12 MONTHS, YOU WORRIED THAT YOUR FOOD WOULD RUN OUT BEFORE YOU GOT MONEY TO BUY MORE.: NEVER TRUE

## 2022-08-31 SDOH — ECONOMIC STABILITY: FOOD INSECURITY: WITHIN THE PAST 12 MONTHS, THE FOOD YOU BOUGHT JUST DIDN'T LAST AND YOU DIDN'T HAVE MONEY TO GET MORE.: NEVER TRUE

## 2022-08-31 ASSESSMENT — PATIENT HEALTH QUESTIONNAIRE - PHQ9
5. POOR APPETITE OR OVEREATING: 0
SUM OF ALL RESPONSES TO PHQ QUESTIONS 1-9: 5
SUM OF ALL RESPONSES TO PHQ QUESTIONS 1-9: 5
6. FEELING BAD ABOUT YOURSELF - OR THAT YOU ARE A FAILURE OR HAVE LET YOURSELF OR YOUR FAMILY DOWN: 0
7. TROUBLE CONCENTRATING ON THINGS, SUCH AS READING THE NEWSPAPER OR WATCHING TELEVISION: 0
SUM OF ALL RESPONSES TO PHQ QUESTIONS 1-9: 5
9. THOUGHTS THAT YOU WOULD BE BETTER OFF DEAD, OR OF HURTING YOURSELF: 0
10. IF YOU CHECKED OFF ANY PROBLEMS, HOW DIFFICULT HAVE THESE PROBLEMS MADE IT FOR YOU TO DO YOUR WORK, TAKE CARE OF THINGS AT HOME, OR GET ALONG WITH OTHER PEOPLE: 0
SUM OF ALL RESPONSES TO PHQ9 QUESTIONS 1 & 2: 3
3. TROUBLE FALLING OR STAYING ASLEEP: 1
SUM OF ALL RESPONSES TO PHQ QUESTIONS 1-9: 5
8. MOVING OR SPEAKING SO SLOWLY THAT OTHER PEOPLE COULD HAVE NOTICED. OR THE OPPOSITE, BEING SO FIGETY OR RESTLESS THAT YOU HAVE BEEN MOVING AROUND A LOT MORE THAN USUAL: 0
1. LITTLE INTEREST OR PLEASURE IN DOING THINGS: 1
4. FEELING TIRED OR HAVING LITTLE ENERGY: 1
2. FEELING DOWN, DEPRESSED OR HOPELESS: 2

## 2022-08-31 ASSESSMENT — LIFESTYLE VARIABLES: HOW MANY STANDARD DRINKS CONTAINING ALCOHOL DO YOU HAVE ON A TYPICAL DAY: PATIENT DOES NOT DRINK

## 2022-08-31 ASSESSMENT — SOCIAL DETERMINANTS OF HEALTH (SDOH): HOW HARD IS IT FOR YOU TO PAY FOR THE VERY BASICS LIKE FOOD, HOUSING, MEDICAL CARE, AND HEATING?: NOT HARD AT ALL

## 2022-08-31 NOTE — PATIENT INSTRUCTIONS
Personalized Preventive Plan for Edgar Lopez - 8/31/2022  Medicare offers a range of preventive health benefits. Some of the tests and screenings are paid in full while other may be subject to a deductible, co-insurance, and/or copay. Some of these benefits include a comprehensive review of your medical history including lifestyle, illnesses that may run in your family, and various assessments and screenings as appropriate. After reviewing your medical record and screening and assessments performed today your provider may have ordered immunizations, labs, imaging, and/or referrals for you. A list of these orders (if applicable) as well as your Preventive Care list are included within your After Visit Summary for your review. Other Preventive Recommendations:    A preventive eye exam performed by an eye specialist is recommended every 1-2 years to screen for glaucoma; cataracts, macular degeneration, and other eye disorders. A preventive dental visit is recommended every 6 months. Try to get at least 150 minutes of exercise per week or 10,000 steps per day on a pedometer . Order or download the FREE \"Exercise & Physical Activity: Your Everyday Guide\" from [employer]. Call 9-668.320.9565 or search [employer] online. You need 0857-5594 mg of calcium and 3806-8492 IU of vitamin D per day. It is possible to meet your calcium requirement with diet alone, but a vitamin D supplement is usually necessary to meet this goal.  When exposed to the sun, use a sunscreen that protects against both UVA and UVB radiation with an SPF of 30 or greater. Reapply every 2 to 3 hours or after sweating, drying off with a towel, or swimming. Always wear a seat belt when traveling in a car. Always wear a helmet when riding a bicycle or motorcycle.

## 2022-08-31 NOTE — PROGRESS NOTES
Medicare Annual Wellness Visit    Donnie Whipple is here for Medicare AWV    Assessment & Plan   Encounter for annual wellness visit (AWV) in Medicare patient  Nonrheumatic aortic valve insufficiency  HTN (hypertension), benign  Non-rheumatic mitral regurgitation  Pacemaker  Mild intermittent asthma without complication  Seasonal allergic rhinitis, unspecified trigger  Acquired hypothyroidism  Diabetes mellitus type 2, diet-controlled ([address]  DDD (degenerative disc disease), cervical  Overactive bladder  Constipation, unspecified constipation type  Chronic fatigue  Difficulty walking  History of complete heart block  Hyperlipidemia LDL goal <100    Recommendations for Preventive Services Due: see orders and patient instructions/AVS.  Recommended screening schedule for the next 5-10 years is provided to the patient in written form: see Patient Instructions/AVS.     Return in about 1 year (around 8/31/2023). Subjective     Patient's complete Health Risk Assessment and screening values have been reviewed and are found in Flowsheets. The following problems were reviewed today and where indicated follow up appointments were made and/or referrals ordered. Positive Risk Factor Screenings with Interventions:    Fall Risk:  Do you feel unsteady or are you worried about falling? : (!) yes  2 or more falls in past year?: no  Fall with injury in past year?: no   Fall Risk Interventions:    Patient uses a rollator when walking. Cognitive: Words recalled: 2 Words Recalled  Clock Drawing Test (CDT): (!) Abnormal  Total Score Interpretation: Abnormal Mini-Cog  Did the patient refuse to take the cognition test?: No  Cognitive Impairment Interventions:  Patient lives at [address] [place] [address] and receives assistance. Depression:       Severity:1-4 = minimal depression, 5-9 = mild depression, 10-14 = moderate depression, 15-19 = moderately severe depression, 20-27 = severe depression  Depression Interventions:  Start Lexapro. [Tamsulosin Hcl] Other (See Comments)     Causes chest pain    Ace Inhibitors Swelling     Causes lip swelling    Flomax [Tamsulosin]     Alendronate Sodium Other (See Comments)     Pt unsure of allergies    Lasix [Furosemide] Other (See Comments)     weakness    Lisinopril Rash    Penicillins Rash     Rash    Propolis Other (See Comments)    Vioxx [Rofecoxib] Other (See Comments)     Prior to Visit Medications    Medication Sig Taking? Authorizing Provider   escitalopram (LEXAPRO) 5 MG tablet Take 1 tablet by mouth daily  Bhavya Gaston MD   FLONASE 50 MCG/ACT nasal spray Two sprays to each nostril daily. Giorgi Lawson PA-C   amLODIPine (NORVASC) 5 MG tablet TAKE 1 TABLET BY MOUTH AT  BEDTIME. Bhavya Gaston MD   metoprolol succinate (TOPROL XL) 25 MG extended release tablet TAKE 1 TABLET BY MOUTH AT  BEDTIME. Bhavya Gaston MD   spironolactone (ALDACTONE) 25 MG tablet TAKE 1 TABLET BY MOUTH DAILY  Bhavya Gaston MD   loratadine (CLARITIN) 10 MG tablet TAKE 1 TABLET BY MOUTH ONCE A DAY  Bhavya Gaston MD   polyethylene glycol (GLYCOLAX) 17 GM/SCOOP powder Take 17 g by mouth daily as needed (constipation)  Bhavya Gaston MD   folic acid (FOLVITE) 357 MCG tablet TAKE 1 TABLET BY MOUTH IN  THE MORNING. Bhavya Gaston MD   metoprolol succinate (TOPROL XL) 50 MG extended release tablet TAKE 1 TABLET BY MOUTH DAILY  Vince Espinal MD   ipratropium (ATROVENT) 0.06 % nasal spray 2 sprays by Each Nostril route 4 times daily as needed for Rhinitis  Bhavya Gaston MD   acetaminophen (ACETAMINOPHEN EXTRA STRENGTH) 500 MG tablet TAKE 1 TABLET BY MOUTH FOUR TIMES DAILY AS NEEDED FOR PAIN. Bhavya Gaston MD   aspirin (ASPIR-LOW) 81 MG EC tablet TAKE 1 TABLET BY MOUTH ONCE DAILY  Bhavya Gaston MD   Incontinence Supply Disposable MISC 2 each by Does not apply route as needed (Incontinence) Use prn. Dispense 2 cases. 11 Refills.  Size large  Bhavya Gaston MD   vitamin D (CHOLECALCIFEROL) 50 MCG (2000 UT) TABS tablet Take 1 tablet by mouth daily  Historical Provider, MD   simvastatin (ZOCOR) 10 MG tablet TAKE 1 TABLET BY MOUTH AT  BEDTIME. Fifi Harrison MD   calcium carbonate-vitamin D 600-200 MG-UNIT TABS Take 1 tablet by mouth 2 times daily  Fifi Harrison MD   levothyroxine (SYNTHROID) 100 MCG tablet Take 1 tablet by mouth Daily TAKE 1 TABLET BY MOUTH IN THE MORNING 30 MINUTES BEFORE BREAKFAST OR OTHER MEDICATIONS. Fifi Harrison MD   Omega-3 Fatty Acids (FISH OIL) 1200 MG CAPS TAKE 1 CAPSULE BY MOUTH IN THE MORNING.   Fifi Harrison MD       [facility] (Including outside providers/suppliers regularly involved in providing care):   Patient Care Team:  Fifi Harrison MD as PCP - General ([address] )  Fifi Harrison MD as PCP - [facility] Empaneled Provider  Luci Rosenbaum DO as Resident (Family Medicine)  PAMELA Zepeda as  (Care Coordinator)  Eulogio Murphy MD as Consulting Physician (Cardiac Electrophysiology)     Reviewed and updated this visit:  Tobacco  Allergies  Meds  Problems  Med Hx  Surg Hx  Soc Hx  Fam Hx

## 2022-09-27 NOTE — PROGRESS NOTES
9/28/2022    Home visit medically necessary in lieu of an office visit due to: [facility] resident, uses walker, difficult to get out. HPI:  Patient says she is doing okay. She is not sure if she is less depressed or not on Lexapro. Her allergies have been okay with Flonase. Her knees, hands and feet DJD pain are not bothering her much currently. She uses BioFreeze when needed and takes Tylenol also. Her legs have not had much swelling. She elevates her legs as much as possible when she is in her room. Her overactive bladder is bothering her. She does Kegel exercises sometimes. She has urine still leaking out occasionally. She wears Depends. She has been moving her bowels well. She has had no falls in this month. REVIEW OF SYSTEMS:  GENERAL: Appetite good. No weight change, generally healthy, no change in strength or exercise tolerance. CARDIOVASCULAR: No chest pains, no murmurs, no palpitations, no syncope, no orthopnea. LEGS HAVE BEEN SWELLING. RESPIRATORY: No shortness of breath, no pain with breathing, no wheezing, no hemoptysis, no cough. BREASTS:  No lumps, discharge or pain. GASTROINTESTINAL: No change in appetite, no dysphagia, no heartburn, no abdominal pains, no diarrhea, no bowel habit changes, no emesis, no melena, no hemorrhoids. CONSTIPATION SOMETIMES. GENITOURINARY: No incontinence or retention, no urinary urgency, no frequent UTIs, no dysuria, no change in nature of urine. STRESS INCONTINENCE AT TIMES. MUSCULOSKELETAL: No swelling or redness of joints, no limitation of range of motion, no weakness or numbness. HAS HAD JT PAINS BUT NOT NOW. NEURO/PSYCH: No weakness, no tremor, no seizures, no changes in mentation, no ataxia. No depressive symptoms, no changes in sleep habits, no changes in thought content. MEMORY PROBLEMS. All other systems negative.     Allergies   Allergen Reactions    Flomax [Tamsulosin Hcl] Other (See Comments)     Causes chest pain    Ace Inhibitors Swelling     Causes lip swelling    Flomax [Tamsulosin]     Alendronate Sodium Other (See Comments)     Pt unsure of allergies    Lasix [Furosemide] Other (See Comments)     weakness    Lisinopril Rash    Penicillins Rash     Rash    Propolis Other (See Comments)    Vioxx [Rofecoxib] Other (See Comments)     Past Medical History:   Diagnosis Date    Asthma     Atrioventricular (AV) dissociation     AV block, 1st degree 2/11/2015    Diabetes mellitus (HCC)     GERD (gastroesophageal reflux disease)     Herniated disc     Cervical    Hyperlipidemia     Hypertension     Mild aortic regurgitation 11/5/13    Mitral regurgitation 11/5/13    mild-moderate    Nontoxic uninodular goiter     OA (osteoarthritis of spine)     cervical spine    Obesity      Past Surgical History:   Procedure Laterality Date    A-V CARDIAC PACEMAKER INSERTION Left 06/13/2016    Dual chamber pacemaker [address] by Dr Roberto Tao ([address] [place] [address])       Social History     Socioeconomic History    Marital status:      Spouse name: Not on file    Number of children: None    Years of education: Not on file    Highest education level: Not on file   Occupational History    Not on file   Tobacco Use    Smoking status: Never Smoker    Smokeless tobacco: Never Used   Substance and Sexual Activity    Alcohol use: No     Frequency: Never     Binge frequency: Never     Comment:      Drug use: No    Sexual activity: Not Currently   Social History Narrative    1 cup tea daily; occ pop; no coffee      No family history on file.     Vaccine Date   COVID-19 12/23/20, 1/13/21   Influenza 10/12/21   Prevnar 13 12/29/2015   Pneumovax 23 1/12/2017     Code Status Full  7/30/21     PHYSICAL EXAM:     Vitals:    09/28/22 0949   BP: (!) 97/51   Site: Left Wrist   Position: Sitting   Pulse: 65   Resp: 20   Temp: 97.3 °F (36.3 °C)   TempSrc: Infrared   SpO2: 99%   Weight: 193 lb 6.4 oz (87.7 kg)   Height: 5' 4\" (1.626 m)     Estimated body mass index is 33.2 kg/m² as calculated from the following:    Height as of this encounter: 5' 4\" (1.626 m). Weight as of this encounter: 193 lb 6.4 oz (87.7 kg). GEN:  elderly [facility] female patient seated in recliner in NAD. HEAD:  atraumatic, normocephalic. EYES:  EOMI, PERRL, no cataracts, conjunctivae appear normal.  ENT:   Good hearing, EACs without wax, TM's normal, nasal septum midline, no significant congestion, oral cavity without lesions, poor-fair dentition, no dentures. NECK:  fair-good ROM, no palpable masses, no carotid bruits, no JVD. LUNGS:  clear to ausc, no rales, rhonchi or wheezes. HEART:  RRR, no murmurs or gallops. ABD:  soft, non-tender to palp, no palp masses or HSM, normal BS. BACK:  no scoliosis or kyphosis, non-tender to palp. EXTREMITIES: Trace edema, no ulcerations, varicosities or erythema. No gross deformities. MUSCULOSKELETAL: Good ROM of all joints, non tender to palp and or with movement. SKIN:  No ulcerations or breakdown, rash, ecchymosis, or other lesions. NEURO:  No tremor, motor UEs 5/5 LEs  5/5, sensory normal, able to stand and walk slowly using rollator with mild ataxia. PSYCH:  Pleasant and cooperative. Fluid speech, oriented to person, place and time. No delusional statements. Medications reviewed. Labs: 7/8/22 Na 129, GFR 47  6/13/22 HH 13/39, Na 127, GFR 47, LFTs nl, TSH 1.4, , HDL 51, A1c 6.4  3/11/21 GFR 57, Na 130, A1c 6.4  12/9/21 Na 130, GFR 51, A1c 6.3  9/7/21 GFR 63, Na 128, A1c 6.2    ASSESSMENT:  Elderly female has HTN (hypertension), benign; Diabetes mellitus type 2, diet-controlled ([address] DDD (degenerative disc disease), cervical; Non-rheumatic mitral regurgitation; AI (aortic insufficiency); Pacemaker; Hyperlipidemia LDL goal <100; Acquired hypothyroidism; Syncope and collapse; Hyponatremia; Primary osteoarthritis involving multiple joints; Mild intermittent asthma without complication;  History of complete heart block; Depression; Difficulty walking; Chronic fatigue; Overactive bladder; Constipation; Urinary incontinence; and Seasonal allergic rhinitis on their problem list.     Donnie was seen today for memory loss. Diagnoses and all orders for this visit:    HTN (hypertension), benign    Seasonal allergic rhinitis, unspecified trigger    Diabetes mellitus type 2, diet-controlled ([address]    Primary osteoarthritis involving multiple joints    Overactive bladder    Constipation, unspecified constipation type      PLAN:    Continue Lexapro for depressed mood. Encouraged to elevate legs and use compression stockings. Discussed diet and activity level. Continue to use BioFreeze on painful hand joints. Continue Tylenol for pain as needed. Encouraged to drink plenty of fluids. Check BMP & A1c every 3 months, next in October. Continue other meds as per Rx List. Recheck 1 month. 40 minutes spent on visit, 25 minutes involved education/counseling regarding HTN, DM, weight, urinary, and DJD disease processes, treatment options, meds and coordination of care. Current Outpatient Medications   Medication Sig Dispense Refill    escitalopram (LEXAPRO) 5 MG tablet Take 1 tablet by mouth daily 30 tablet 5    FLONASE 50 MCG/ACT nasal spray Two sprays to each nostril daily. 1 each 11    amLODIPine (NORVASC) 5 MG tablet TAKE 1 TABLET BY MOUTH AT  BEDTIME. 30 tablet 11    metoprolol succinate (TOPROL XL) 25 MG extended release tablet TAKE 1 TABLET BY MOUTH AT  BEDTIME. 90 tablet 3    spironolactone (ALDACTONE) 25 MG tablet TAKE 1 TABLET BY MOUTH DAILY 30 tablet 11    loratadine (CLARITIN) 10 MG tablet TAKE 1 TABLET BY MOUTH ONCE A DAY 90 tablet 3    polyethylene glycol (GLYCOLAX) 17 GM/SCOOP powder Take 17 g by mouth daily as needed (constipation) 191 g 5    folic acid (FOLVITE) 057 MCG tablet TAKE 1 TABLET BY MOUTH IN  THE MORNING.  30 tablet 11    metoprolol succinate (TOPROL XL) 50 MG extended release tablet TAKE 1 TABLET BY MOUTH DAILY 30 tablet 7 ipratropium (ATROVENT) 0.06 % nasal spray 2 sprays by Each Nostril route 4 times daily as needed for Rhinitis 1 Bottle 5    acetaminophen (ACETAMINOPHEN EXTRA STRENGTH) 500 MG tablet TAKE 1 TABLET BY MOUTH FOUR TIMES DAILY AS NEEDED FOR PAIN. 120 tablet 11    aspirin (ASPIR-LOW) 81 MG EC tablet TAKE 1 TABLET BY MOUTH ONCE DAILY 30 tablet 11    Incontinence Supply Disposable MISC 2 each by Does not apply route as needed (Incontinence) Use prn. Dispense 2 cases. 11 Refills. Size large 2 each 11    vitamin D (CHOLECALCIFEROL) 50 MCG (2000 UT) TABS tablet Take 1 tablet by mouth daily      simvastatin (ZOCOR) 10 MG tablet TAKE 1 TABLET BY MOUTH AT  BEDTIME. 30 tablet 11    calcium carbonate-vitamin D 600-200 MG-UNIT TABS Take 1 tablet by mouth 2 times daily 60 tablet 11    levothyroxine (SYNTHROID) 100 MCG tablet Take 1 tablet by mouth Daily TAKE 1 TABLET BY MOUTH IN THE MORNING 30 MINUTES BEFORE BREAKFAST OR OTHER MEDICATIONS. 90 tablet 3    Omega-3 Fatty Acids (FISH OIL) 1200 MG CAPS TAKE 1 CAPSULE BY MOUTH IN THE MORNING. 30 capsule 11     No current facility-administered medications for this visit. Return in about 1 month (around 10/28/2022). An electronic signature was used to authenticate this note.     --Lance Tomlinson MD on 9/28/2022 at 10:04 AM

## 2022-09-28 ENCOUNTER — OFFICE VISIT (OUTPATIENT)
Dept: PRIMARY CARE CLINIC | Age: 87
End: 2022-09-28
Payer: COMMERCIAL

## 2022-09-28 VITALS
SYSTOLIC BLOOD PRESSURE: 97 MMHG | TEMPERATURE: 97.3 F | HEIGHT: 64 IN | WEIGHT: 193.4 LBS | HEART RATE: 65 BPM | OXYGEN SATURATION: 99 % | RESPIRATION RATE: 20 BRPM | BODY MASS INDEX: 33.02 KG/M2 | DIASTOLIC BLOOD PRESSURE: 51 MMHG

## 2022-09-28 DIAGNOSIS — E11.9 DIABETES MELLITUS TYPE 2, DIET-CONTROLLED (HCC): Chronic | ICD-10-CM

## 2022-09-28 DIAGNOSIS — J30.2 SEASONAL ALLERGIC RHINITIS, UNSPECIFIED TRIGGER: ICD-10-CM

## 2022-09-28 DIAGNOSIS — N32.81 OVERACTIVE BLADDER: ICD-10-CM

## 2022-09-28 DIAGNOSIS — K59.00 CONSTIPATION, UNSPECIFIED CONSTIPATION TYPE: ICD-10-CM

## 2022-09-28 DIAGNOSIS — M15.9 PRIMARY OSTEOARTHRITIS INVOLVING MULTIPLE JOINTS: ICD-10-CM

## 2022-09-28 DIAGNOSIS — I10 HTN (HYPERTENSION), BENIGN: Primary | Chronic | ICD-10-CM

## 2022-09-28 PROCEDURE — 1123F ACP DISCUSS/DSCN MKR DOCD: CPT | Performed by: FAMILY MEDICINE

## 2022-09-28 PROCEDURE — G8417 CALC BMI ABV UP PARAM F/U: HCPCS | Performed by: FAMILY MEDICINE

## 2022-09-28 PROCEDURE — 3044F HG A1C LEVEL LT 7.0%: CPT | Performed by: FAMILY MEDICINE

## 2022-09-28 PROCEDURE — 1090F PRES/ABSN URINE INCON ASSESS: CPT | Performed by: FAMILY MEDICINE

## 2022-10-25 NOTE — PROGRESS NOTES
10/26/2022    Home visit medically necessary in lieu of an office visit due to: [facility] resident, uses walker, difficult to get out. HPI:  Patient says she is doing okay. Her knees, hands and feet DJD pain are not bothering her that much currently. She uses BioFreeze and takes Tylenol which help. Her allergies are okay with Flonase. Her legs have not had much swelling. She elevates her legs when she is in her room. She says she feels okay on Lexapro. Her overactive bladder bothers her. She does Kegel exercises sometimes. She has urine still leaking out occasionally. She wears Depends. She has been moving her bowels well. She has had no falls in this month. REVIEW OF SYSTEMS:  GENERAL: Appetite good. No weight change, generally healthy, no change in strength or exercise tolerance. CARDIOVASCULAR: No chest pains, no murmurs, no palpitations, no syncope, no orthopnea. LEGS HAVE BEEN SWELLING. RESPIRATORY: No shortness of breath, no pain with breathing, no wheezing, no hemoptysis, no cough. BREASTS:  No lumps, discharge or pain. GASTROINTESTINAL: No change in appetite, no dysphagia, no heartburn, no abdominal pains, no diarrhea, no bowel habit changes, no emesis, no melena, no hemorrhoids. CONSTIPATION SOMETIMES. GENITOURINARY: No incontinence or retention, no urinary urgency, no frequent UTIs, no dysuria, no change in nature of urine. STRESS INCONTINENCE AT TIMES. MUSCULOSKELETAL: No swelling or redness of joints, no limitation of range of motion, no weakness or numbness. HAS HAD JT PAINS BUT NOT NOW. NEURO/PSYCH: No weakness, no tremor, no seizures, no changes in mentation, no ataxia. No depressive symptoms, no changes in sleep habits, no changes in thought content. MEMORY PROBLEMS. All other systems negative.     Allergies   Allergen Reactions    Flomax [Tamsulosin Hcl] Other (See Comments)     Causes chest pain    Ace Inhibitors Swelling     Causes lip swelling    Flomax [Tamsulosin]     Alendronate Sodium Other (See Comments)     Pt unsure of allergies    Lasix [Furosemide] Other (See Comments)     weakness    Lisinopril Rash    Penicillins Rash     Rash    Propolis Other (See Comments)    Vioxx [Rofecoxib] Other (See Comments)     Past Medical History:   Diagnosis Date    Asthma     Atrioventricular (AV) dissociation     AV block, 1st degree 2/11/2015    Diabetes mellitus (HCC)     GERD (gastroesophageal reflux disease)     Herniated disc     Cervical    Hyperlipidemia     Hypertension     Mild aortic regurgitation 11/5/13    Mitral regurgitation 11/5/13    mild-moderate    Nontoxic uninodular goiter     OA (osteoarthritis of spine)     cervical spine    Obesity      Past Surgical History:   Procedure Laterality Date    A-V CARDIAC PACEMAKER INSERTION Left 06/13/2016    Dual chamber pacemaker [address] by Dr Angel Bassett ([address] [place] [address])       Social History     Socioeconomic History    Marital status:      Spouse name: Not on file    Number of children: None    Years of education: Not on file    Highest education level: Not on file   Occupational History    Not on file   Tobacco Use    Smoking status: Never Smoker    Smokeless tobacco: Never Used   Substance and Sexual Activity    Alcohol use: No     Frequency: Never     Binge frequency: Never     Comment:      Drug use: No    Sexual activity: Not Currently   Social History Narrative    1 cup tea daily; occ pop; no coffee      No family history on file.     Vaccine Date   COVID-19 12/23/20, 1/13/21   Influenza 10/12/21   Prevnar 13 12/29/2015   Pneumovax 23 1/12/2017     Code Status Full  7/30/21     PHYSICAL EXAM:     Vitals:    10/26/22 0922   BP: 125/71   Site: Right Wrist   Position: Sitting   Pulse: 72   Resp: 18   Temp: 97.7 °F (36.5 °C)   TempSrc: Infrared   SpO2: 96%   Weight: 193 lb 6.4 oz (87.7 kg)   Height: 5' 4\" (1.626 m)     Estimated body mass index is 33.2 kg/m² as calculated from the following:    Height as of this encounter: 5' 4\" (1.626 m). Weight as of this encounter: 193 lb 6.4 oz (87.7 kg). GEN:  elderly [facility] female patient seated in recliner in NAD. HEAD:  atraumatic, normocephalic. EYES:  EOMI, PERRL, no cataracts, conjunctivae appear normal.  ENT:   Good hearing, EACs without wax, TM's normal, nasal septum midline, no significant congestion, oral cavity without lesions, poor-fair dentition, no dentures. NECK:  fair-good ROM, no palpable masses, no carotid bruits, no JVD. LUNGS:  clear to ausc, no rales, rhonchi or wheezes. HEART:  RRR, no murmurs or gallops. ABD:  soft, non-tender to palp, no palp masses or HSM, normal BS. BACK:  no scoliosis or kyphosis, non-tender to palp. EXTREMITIES: Trace edema, no ulcerations, varicosities or erythema. No gross deformities. MUSCULOSKELETAL: Good ROM of all joints, non tender to palp and or with movement. SKIN:  No ulcerations or breakdown, rash, ecchymosis, or other lesions. NEURO:  No tremor, motor UEs 5/5 LEs  5/5, sensory normal, able to stand and walk slowly using rollator with mild ataxia. PSYCH:  Pleasant and cooperative. Fluid speech, oriented to person, place and time. No delusional statements. Medications reviewed. Labs: 7/8/22 Na 129, GFR 47  6/13/22 HH 13/39, Na 127, GFR 47, LFTs nl, TSH 1.4, , HDL 51, A1c 6.4  3/11/21 GFR 57, Na 130, A1c 6.4  12/9/21 Na 130, GFR 51, A1c 6.3     ASSESSMENT:  Elderly female has HTN (hypertension), benign; Diabetes mellitus type 2, diet-controlled ([address] DDD (degenerative disc disease), cervical; Non-rheumatic mitral regurgitation; AI (aortic insufficiency); Pacemaker; Hyperlipidemia LDL goal <100; Acquired hypothyroidism; Syncope and collapse; Hyponatremia; Primary osteoarthritis involving multiple joints; Mild intermittent asthma without complication; History of complete heart block; Depression; Difficulty walking; Chronic fatigue;  Overactive bladder; Constipation; Urinary incontinence; and Seasonal allergic rhinitis on their problem list.     Donnie was seen today for immunizations and hypertension. Diagnoses and all orders for this visit:    HTN (hypertension), benign  -     Basic Metabolic Panel; Future    Diabetes mellitus type 2, diet-controlled ([facility]  -     Basic Metabolic Panel; Future  -     Hemoglobin A1C; Future    Primary osteoarthritis involving multiple joints    Constipation, unspecified constipation type    Need for influenza vaccination  -     Influenza, FLUAD, (age 72 y+), IM, PF, 0.5 mL      PLAN:    Check labs. Fluad vac given L deltoid. Continue Lexapro for depressed mood. Encouraged to elevate legs and use compression stockings. Discussed diet and activity level. Continue to use BioFreeze on painful hand joints. Continue Tylenol for pain as needed. Encouraged to drink plenty of fluids. Check BMP & A1c every 3 months, next in January. Continue other meds as per Rx List. Recheck 1 month. 40 minutes spent on visit, 25 minutes involved education/counseling regarding HTN, DM, weight, urinary, and DJD disease processes, treatment options, meds and coordination of care. Current Outpatient Medications   Medication Sig Dispense Refill    escitalopram (LEXAPRO) 5 MG tablet Take 1 tablet by mouth daily 30 tablet 5    FLONASE 50 MCG/ACT nasal spray Two sprays to each nostril daily. 1 each 11    amLODIPine (NORVASC) 5 MG tablet TAKE 1 TABLET BY MOUTH AT  BEDTIME. 30 tablet 11    metoprolol succinate (TOPROL XL) 25 MG extended release tablet TAKE 1 TABLET BY MOUTH AT  BEDTIME. 90 tablet 3    spironolactone (ALDACTONE) 25 MG tablet TAKE 1 TABLET BY MOUTH DAILY 30 tablet 11    loratadine (CLARITIN) 10 MG tablet TAKE 1 TABLET BY MOUTH ONCE A DAY 90 tablet 3    polyethylene glycol (GLYCOLAX) 17 GM/SCOOP powder Take 17 g by mouth daily as needed (constipation) 682 g 5    folic acid (FOLVITE) 079 MCG tablet TAKE 1 TABLET BY MOUTH IN  THE MORNING.  30 tablet 11    metoprolol succinate

## 2022-10-26 ENCOUNTER — OFFICE VISIT (OUTPATIENT)
Dept: PRIMARY CARE CLINIC | Age: 87
End: 2022-10-26
Payer: COMMERCIAL

## 2022-10-26 VITALS
OXYGEN SATURATION: 96 % | RESPIRATION RATE: 18 BRPM | SYSTOLIC BLOOD PRESSURE: 125 MMHG | DIASTOLIC BLOOD PRESSURE: 71 MMHG | BODY MASS INDEX: 33.02 KG/M2 | HEIGHT: 64 IN | WEIGHT: 193.4 LBS | TEMPERATURE: 97.7 F | HEART RATE: 72 BPM

## 2022-10-26 DIAGNOSIS — E11.9 DIABETES MELLITUS TYPE 2, DIET-CONTROLLED (HCC): Chronic | ICD-10-CM

## 2022-10-26 DIAGNOSIS — K59.00 CONSTIPATION, UNSPECIFIED CONSTIPATION TYPE: ICD-10-CM

## 2022-10-26 DIAGNOSIS — M15.9 PRIMARY OSTEOARTHRITIS INVOLVING MULTIPLE JOINTS: ICD-10-CM

## 2022-10-26 DIAGNOSIS — Z23 NEED FOR INFLUENZA VACCINATION: ICD-10-CM

## 2022-10-26 DIAGNOSIS — I10 HTN (HYPERTENSION), BENIGN: Primary | Chronic | ICD-10-CM

## 2022-10-26 PROCEDURE — 1090F PRES/ABSN URINE INCON ASSESS: CPT | Performed by: FAMILY MEDICINE

## 2022-10-26 PROCEDURE — 3044F HG A1C LEVEL LT 7.0%: CPT | Performed by: FAMILY MEDICINE

## 2022-10-26 PROCEDURE — 1123F ACP DISCUSS/DSCN MKR DOCD: CPT | Performed by: FAMILY MEDICINE

## 2022-10-26 PROCEDURE — G8417 CALC BMI ABV UP PARAM F/U: HCPCS | Performed by: FAMILY MEDICINE

## 2022-10-26 PROCEDURE — 90694 VACC AIIV4 NO PRSRV 0.5ML IM: CPT | Performed by: FAMILY MEDICINE

## 2022-10-26 PROCEDURE — G0008 ADMIN INFLUENZA VIRUS VAC: HCPCS | Performed by: FAMILY MEDICINE

## 2022-10-26 PROCEDURE — G8484 FLU IMMUNIZE NO ADMIN: HCPCS | Performed by: FAMILY MEDICINE

## 2022-11-02 DIAGNOSIS — E03.9 ACQUIRED HYPOTHYROIDISM: ICD-10-CM

## 2022-11-02 RX ORDER — LEVOTHYROXINE SODIUM 0.1 MG/1
100 TABLET ORAL DAILY
Qty: 90 TABLET | Refills: 3 | Status: SHIPPED | OUTPATIENT
Start: 2022-11-02

## 2022-12-05 NOTE — PROGRESS NOTES
12/6/2022    Home visit medically necessary in lieu of an office visit due to: [facility] resident, uses walker, difficult to get out. HPI:  Patient says she is doing okay. She has had coughing spells for the last couple months that is bothering her. Her allergies are okay with Flonase. Her knees, hands and feet DJD pain are not bothering her that much currently. She uses BioFreeze and takes Tylenol which help. Her legs have not had much swelling. She elevates her legs when she is in her room. She says she feels okay on Lexapro. Her overactive bladder bothers her. She does Kegel exercises sometimes. She has urine still leaking out occasionally. She wears Depends. She has been moving her bowels well. She has not had any falls in this month. REVIEW OF SYSTEMS:  GENERAL: Appetite good. No weight change, generally healthy, no change in strength or exercise tolerance. CARDIOVASCULAR: No chest pains, no murmurs, no palpitations, no syncope, no orthopnea. LEGS HAVE BEEN SWELLING. RESPIRATORY: No shortness of breath, no pain with breathing, no wheezing, no hemoptysis, no cough. BREASTS:  No lumps, discharge or pain. GASTROINTESTINAL: No change in appetite, no dysphagia, no heartburn, no abdominal pains, no diarrhea, no bowel habit changes, no emesis, no melena, no hemorrhoids. CONSTIPATION SOMETIMES. GENITOURINARY: No incontinence or retention, no urinary urgency, no frequent UTIs, no dysuria, no change in nature of urine. STRESS INCONTINENCE AT TIMES. MUSCULOSKELETAL: No swelling or redness of joints, no limitation of range of motion, no weakness or numbness. HAS HAD JT PAINS BUT NOT NOW. NEURO/PSYCH: No weakness, no tremor, no seizures, no changes in mentation, no ataxia. No depressive symptoms, no changes in sleep habits, no changes in thought content. MEMORY PROBLEMS. All other systems negative.     Allergies   Allergen Reactions    Flomax [Tamsulosin Hcl] Other (See Comments)     Causes chest pain    Ace Inhibitors Swelling     Causes lip swelling    Flomax [Tamsulosin]     Alendronate Sodium Other (See Comments)     Pt unsure of allergies    Lasix [Furosemide] Other (See Comments)     weakness    Lisinopril Rash    Penicillins Rash     Rash    Propolis Other (See Comments)    Vioxx [Rofecoxib] Other (See Comments)     Past Medical History:   Diagnosis Date    Asthma     Atrioventricular (AV) dissociation     AV block, 1st degree 2/11/2015    Diabetes mellitus (HCC)     GERD (gastroesophageal reflux disease)     Herniated disc     Cervical    Hyperlipidemia     Hypertension     Mild aortic regurgitation 11/5/13    Mitral regurgitation 11/5/13    mild-moderate    Nontoxic uninodular goiter     OA (osteoarthritis of spine)     cervical spine    Obesity      Past Surgical History:   Procedure Laterality Date    A-V CARDIAC PACEMAKER INSERTION Left 06/13/2016    Dual chamber pacemaker [address] by Dr Veronique Brice ([address] [place] [address])       Social History     Socioeconomic History    Marital status:      Spouse name: Not on file    Number of children: None    Years of education: Not on file    Highest education level: Not on file   Occupational History    Not on file   Tobacco Use    Smoking status: Never Smoker    Smokeless tobacco: Never Used   Substance and Sexual Activity    Alcohol use: No     Frequency: Never     Binge frequency: Never     Comment:      Drug use: No    Sexual activity: Not Currently   Social History Narrative    1 cup tea daily; occ pop; no coffee      No family history on file.     Vaccine Date   COVID-19 12/23/20, 1/13/21   Influenza 10/12/21   Prevnar 13 12/29/2015   Pneumovax 23 1/12/2017     Code Status Full  7/30/21     PHYSICAL EXAM:     Vitals:    12/06/22 0929   BP: 112/66   Site: Right Upper Arm   Position: Sitting   Pulse: 76   Resp: 18   Temp: 97.6 °F (36.4 °C)   TempSrc: Infrared   SpO2: 97%   Weight: 193 lb (87.5 kg)   Height: 5' 4\" (1.626 m)       Estimated body mass index is 33.13 kg/m² as calculated from the following:    Height as of this encounter: 5' 4\" (1.626 m). Weight as of this encounter: 193 lb (87.5 kg). GEN:  elderly [facility] female patient seated in recliner in NAD. HEAD:  atraumatic, normocephalic. EYES:  EOMI, PERRL, no cataracts, conjunctivae appear normal.  ENT:   Good hearing, EACs without wax, TM's normal, nasal septum midline, no significant congestion, oral cavity without lesions, poor-fair dentition, no dentures. NECK:  fair-good ROM, no palpable masses, no carotid bruits, no JVD. LUNGS:  clear to ausc, no rales, rhonchi or wheezes. HEART:  RRR, no murmurs or gallops. ABD:  soft, non-tender to palp, no palp masses or HSM, normal BS. BACK:  no scoliosis or kyphosis, non-tender to palp. EXTREMITIES: Trace edema, no ulcerations, varicosities or erythema. No gross deformities. MUSCULOSKELETAL: Good ROM of all joints, non tender to palp and or with movement. SKIN:  No ulcerations or breakdown, rash, ecchymosis, or other lesions. NEURO:  No tremor, motor UEs 5/5 LEs  5/5, sensory normal, able to stand and walk slowly using rollator with mild ataxia. PSYCH:  Pleasant and cooperative. Fluid speech, oriented to person, place and time. No delusional statements. Medications reviewed. Labs: 7/8/22 Na 129, GFR 47  6/13/22 HH 13/39, Na 127, GFR 47, LFTs nl, TSH 1.4, , HDL 51, A1c 6.4      ASSESSMENT:  Elderly female has HTN (hypertension), benign; Diabetes mellitus type 2, diet-controlled ([facility] [address] DDD (degenerative disc disease), cervical; Non-rheumatic mitral regurgitation; AI (aortic insufficiency); Pacemaker; Hyperlipidemia LDL goal <100; Acquired hypothyroidism; Syncope and collapse; Hyponatremia; Primary osteoarthritis involving multiple joints; Mild intermittent asthma without complication; History of complete heart block; Depression; Difficulty walking; Chronic fatigue;  Overactive bladder; Constipation; Urinary incontinence; and Seasonal allergic rhinitis on their problem list.     Donnie was seen today for hypertension and cough. Diagnoses and all orders for this visit:    HTN (hypertension), benign    Seasonal allergic rhinitis, unspecified trigger    Diabetes mellitus type 2, diet-controlled ([facility]    Primary osteoarthritis involving multiple joints    Constipation, unspecified constipation type    Other orders  -     Dextromethorphan-guaiFENesin (MUCINEX DM)  MG TB12; Take 1 tablet by mouth 2 times daily as needed (cough)      PLAN:    Check labs. Start Mucinex DM BID x 1 week, then use prn. Continue Lexapro for depressed mood. Encouraged to elevate legs and use compression stockings. Discussed diet and activity level. Continue to use BioFreeze on painful hand joints. Continue Tylenol for pain as needed. Encouraged to drink plenty of fluids. Check BMP & A1c every 3 months, next in January. Continue other meds as per Rx List. Recheck 1 month. 40 minutes spent on visit, 25 minutes involved education/counseling regarding HTN, DM, weight, urinary, and DJD disease processes, treatment options, meds and coordination of care. Current Outpatient Medications   Medication Sig Dispense Refill    Dextromethorphan-guaiFENesin (MUCINEX DM)  MG TB12 Take 1 tablet by mouth 2 times daily as needed (cough) 40 tablet 3    levothyroxine (SYNTHROID) 100 MCG tablet Take 1 tablet by mouth Daily TAKE 1 TABLET BY MOUTH IN THE MORNING 30 MINUTES BEFORE BREAKFAST OR OTHER MEDICATIONS. 90 tablet 3    escitalopram (LEXAPRO) 5 MG tablet Take 1 tablet by mouth daily 30 tablet 5    FLONASE 50 MCG/ACT nasal spray Two sprays to each nostril daily. 1 each 11    amLODIPine (NORVASC) 5 MG tablet TAKE 1 TABLET BY MOUTH AT  BEDTIME. 30 tablet 11    metoprolol succinate (TOPROL XL) 25 MG extended release tablet TAKE 1 TABLET BY MOUTH AT  BEDTIME.  90 tablet 3    spironolactone (ALDACTONE) 25 MG tablet TAKE 1 TABLET BY MOUTH DAILY 30 tablet 11    loratadine (CLARITIN) 10 MG tablet TAKE 1 TABLET BY MOUTH ONCE A DAY 90 tablet 3    polyethylene glycol (GLYCOLAX) 17 GM/SCOOP powder Take 17 g by mouth daily as needed (constipation) 802 g 5    folic acid (FOLVITE) 824 MCG tablet TAKE 1 TABLET BY MOUTH IN  THE MORNING. 30 tablet 11    metoprolol succinate (TOPROL XL) 50 MG extended release tablet TAKE 1 TABLET BY MOUTH DAILY 30 tablet 7    ipratropium (ATROVENT) 0.06 % nasal spray 2 sprays by Each Nostril route 4 times daily as needed for Rhinitis 1 Bottle 5    acetaminophen (ACETAMINOPHEN EXTRA STRENGTH) 500 MG tablet TAKE 1 TABLET BY MOUTH FOUR TIMES DAILY AS NEEDED FOR PAIN. 120 tablet 11    aspirin (ASPIR-LOW) 81 MG EC tablet TAKE 1 TABLET BY MOUTH ONCE DAILY 30 tablet 11    Incontinence Supply Disposable MISC 2 each by Does not apply route as needed (Incontinence) Use prn. Dispense 2 cases. 11 Refills. Size large 2 each 11    vitamin D (CHOLECALCIFEROL) 50 MCG (2000 UT) TABS tablet Take 1 tablet by mouth daily      simvastatin (ZOCOR) 10 MG tablet TAKE 1 TABLET BY MOUTH AT  BEDTIME. 30 tablet 11    calcium carbonate-vitamin D 600-200 MG-UNIT TABS Take 1 tablet by mouth 2 times daily 60 tablet 11    Omega-3 Fatty Acids (FISH OIL) 1200 MG CAPS TAKE 1 CAPSULE BY MOUTH IN THE MORNING. 30 capsule 11     No current facility-administered medications for this visit. Return in about 1 month (around 1/6/2023). An electronic signature was used to authenticate this note.     --Carla Duane, MD on 12/6/2022 at 10:03 AM

## 2022-12-06 ENCOUNTER — OFFICE VISIT (OUTPATIENT)
Dept: PRIMARY CARE CLINIC | Age: 87
End: 2022-12-06
Payer: COMMERCIAL

## 2022-12-06 VITALS
WEIGHT: 193 LBS | OXYGEN SATURATION: 97 % | SYSTOLIC BLOOD PRESSURE: 112 MMHG | TEMPERATURE: 97.6 F | HEIGHT: 64 IN | HEART RATE: 76 BPM | RESPIRATION RATE: 18 BRPM | DIASTOLIC BLOOD PRESSURE: 66 MMHG | BODY MASS INDEX: 32.95 KG/M2

## 2022-12-06 DIAGNOSIS — E11.9 DIABETES MELLITUS TYPE 2, DIET-CONTROLLED (HCC): Chronic | ICD-10-CM

## 2022-12-06 DIAGNOSIS — K59.00 CONSTIPATION, UNSPECIFIED CONSTIPATION TYPE: ICD-10-CM

## 2022-12-06 DIAGNOSIS — J30.2 SEASONAL ALLERGIC RHINITIS, UNSPECIFIED TRIGGER: ICD-10-CM

## 2022-12-06 DIAGNOSIS — M15.9 PRIMARY OSTEOARTHRITIS INVOLVING MULTIPLE JOINTS: ICD-10-CM

## 2022-12-06 DIAGNOSIS — I10 HTN (HYPERTENSION), BENIGN: Primary | Chronic | ICD-10-CM

## 2022-12-06 PROCEDURE — 1090F PRES/ABSN URINE INCON ASSESS: CPT | Performed by: FAMILY MEDICINE

## 2022-12-06 PROCEDURE — 1123F ACP DISCUSS/DSCN MKR DOCD: CPT | Performed by: FAMILY MEDICINE

## 2022-12-06 PROCEDURE — G8417 CALC BMI ABV UP PARAM F/U: HCPCS | Performed by: FAMILY MEDICINE

## 2022-12-06 PROCEDURE — 3044F HG A1C LEVEL LT 7.0%: CPT | Performed by: FAMILY MEDICINE

## 2022-12-06 PROCEDURE — G8484 FLU IMMUNIZE NO ADMIN: HCPCS | Performed by: FAMILY MEDICINE

## 2022-12-06 RX ORDER — GUAIFENESIN AND DEXTROMETHORPHAN HYDROBROMIDE 600; 30 MG/1; MG/1
1 TABLET, EXTENDED RELEASE ORAL 2 TIMES DAILY PRN
Qty: 40 TABLET | Refills: 3 | Status: SHIPPED | OUTPATIENT
Start: 2022-12-06

## 2022-12-22 DIAGNOSIS — E03.9 ACQUIRED HYPOTHYROIDISM: ICD-10-CM

## 2022-12-22 RX ORDER — LEVOTHYROXINE SODIUM 0.1 MG/1
100 TABLET ORAL DAILY
Qty: 90 TABLET | Refills: 3 | Status: SHIPPED | OUTPATIENT
Start: 2022-12-22

## 2023-01-09 NOTE — PROGRESS NOTES
1/10/2023    Home visit medically necessary in lieu of an office visit due to: [facility] resident, uses walker, difficult to get out. HPI:  Patient says she is doing pretty good. She has occasional coughing spells for the last couple months. Mucinex DM helps. Her allergies are okay with Flonase. Her knees, hands and feet DJD pain are not hurting much currently. She uses BioFreeze and takes Tylenol which help. Her legs tend to  swell if she doesn't elevate them. She tries to elevate her legs when she is in her room. She says she feels okay on Lexapro. Her overactive bladder bothers her on and off. She does Kegel exercises sometimes. She has urine still leaking out occasionally. She wears Depends. She has been moving her bowels well. She has not had any falls in this month. REVIEW OF SYSTEMS:  GENERAL: Appetite good. No weight change, generally healthy, no change in strength or exercise tolerance. CARDIOVASCULAR: No chest pains, no murmurs, no palpitations, no syncope, no orthopnea. LEGS HAVE BEEN SWELLING. RESPIRATORY: No shortness of breath, no pain with breathing, no wheezing, no hemoptysis, no cough. BREASTS:  No lumps, discharge or pain. GASTROINTESTINAL: No change in appetite, no dysphagia, no heartburn, no abdominal pains, no diarrhea, no bowel habit changes, no emesis, no melena, no hemorrhoids. CONSTIPATION SOMETIMES. GENITOURINARY: No incontinence or retention, no urinary urgency, no frequent UTIs, no dysuria, no change in nature of urine. STRESS INCONTINENCE AT TIMES. MUSCULOSKELETAL: No swelling or redness of joints, no limitation of range of motion, no weakness or numbness. HAS HAD JT PAINS BUT NOT NOW. NEURO/PSYCH: No weakness, no tremor, no seizures, no changes in mentation, no ataxia. No depressive symptoms, no changes in sleep habits, no changes in thought content. MEMORY PROBLEMS. All other systems negative.     Allergies   Allergen Reactions    Flomax [Tamsulosin Hcl] Other (See Comments) Causes chest pain    Ace Inhibitors Swelling     Causes lip swelling    Flomax [Tamsulosin]     Alendronate Sodium Other (See Comments)     Pt unsure of allergies    Lasix [Furosemide] Other (See Comments)     weakness    Lisinopril Rash    Penicillins Rash     Rash    Propolis Other (See Comments)    Vioxx [Rofecoxib] Other (See Comments)     Past Medical History:   Diagnosis Date    Asthma     Atrioventricular (AV) dissociation     AV block, 1st degree 2/11/2015    Diabetes mellitus (HCC)     GERD (gastroesophageal reflux disease)     Herniated disc     Cervical    Hyperlipidemia     Hypertension     Mild aortic regurgitation 11/5/13    Mitral regurgitation 11/5/13    mild-moderate    Nontoxic uninodular goiter     OA (osteoarthritis of spine)     cervical spine    Obesity      Past Surgical History:   Procedure Laterality Date    A-V CARDIAC PACEMAKER INSERTION Left 06/13/2016    Dual chamber pacemaker [address] by Dr Callaway Memos ([address] [place] [address])       Social History     Socioeconomic History    Marital status:      Spouse name: Not on file    Number of children: None    Years of education: Not on file    Highest education level: Not on file   Occupational History    Not on file   Tobacco Use    Smoking status: Never Smoker    Smokeless tobacco: Never Used   Substance and Sexual Activity    Alcohol use: No     Frequency: Never     Binge frequency: Never     Comment:      Drug use: No    Sexual activity: Not Currently   Social History Narrative    1 cup tea daily; occ pop; no coffee      No family history on file.     Vaccine Date   COVID-19 12/23/20, 1/13/21   Influenza 10/12/21   Prevnar 13 12/29/2015   Pneumovax 23 1/12/2017     Code Status Full  7/30/21     PHYSICAL EXAM:     Vitals:    01/10/23 0923   BP: (!) 104/56   Pulse: 60   Resp: 20   Temp: 97.6 °F (36.4 °C)   TempSrc: Infrared   SpO2: 97%   Weight: 193 lb (87.5 kg)   Height: 5' 4\" (1.626 m)     Estimated body mass index is 33.13 kg/m² as calculated from the following:    Height as of this encounter: 5' 4\" (1.626 m). Weight as of this encounter: 193 lb (87.5 kg). GEN:  elderly [facility] female patient seated in recliner in NAD. HEAD:  atraumatic, normocephalic. EYES:  EOMI, PERRL, no cataracts, conjunctivae appear normal.  ENT:   Good hearing, EACs without wax, TM's normal, nasal septum midline, no significant congestion, oral cavity without lesions, poor-fair dentition, no dentures. NECK:  fair-good ROM, no palpable masses, no carotid bruits, no JVD. LUNGS:  clear to ausc, no rales, rhonchi or wheezes. HEART:  RRR, no murmurs or gallops. ABD:  soft, non-tender to palp, no palp masses or HSM, normal BS. BACK:  no scoliosis or kyphosis, non-tender to palp. EXTREMITIES: Trace edema, no ulcerations, varicosities or erythema. No gross deformities. MUSCULOSKELETAL: Good ROM of all joints, non tender to palp and or with movement. SKIN:  No ulcerations or breakdown, rash, ecchymosis, or other lesions. NEURO:  No tremor, motor UEs 5/5 LEs  5/5, sensory normal, able to stand and walk slowly using rollator with mild ataxia. PSYCH:  Pleasant and cooperative. Fluid speech, oriented to person, place and time. No delusional statements. Medications reviewed. Labs: 7/8/22 Na 129, GFR 47  6/13/22 HH 13/39, Na 127, GFR 47, LFTs nl, TSH 1.4, , HDL 51, A1c 6.4      ASSESSMENT:  Elderly female has HTN (hypertension), benign; Diabetes mellitus type 2, diet-controlled ([facility] [address] DDD (degenerative disc disease), cervical; Non-rheumatic mitral regurgitation; AI (aortic insufficiency); Pacemaker; Hyperlipidemia LDL goal <100; Acquired hypothyroidism; Syncope and collapse; Hyponatremia; Primary osteoarthritis involving multiple joints; Mild intermittent asthma without complication; History of complete heart block; Depression; Difficulty walking; Chronic fatigue;  Overactive bladder; Constipation; Urinary incontinence; and Seasonal allergic rhinitis on their problem list.     Donnie was seen today for hypertension and memory loss. Diagnoses and all orders for this visit:    HTN (hypertension), benign  -     Basic Metabolic Panel; Future    Diabetes mellitus type 2, diet-controlled ([facility]  -     Hemoglobin A1C; Future    Primary osteoarthritis involving multiple joints    Overactive bladder    Other orders  -     Dextromethorphan-guaiFENesin (MUCINEX DM)  MG TB12; Take 1 tablet by mouth every morning (before breakfast) And 1 tab in afternoon as needed. PLAN:    Check labs. Continue Mucinex DM q AM straight and in PM prn. Continue Lexapro for depressed mood. Encouraged to elevate legs and use compression stockings. Discussed diet and activity level. Continue to use BioFreeze on painful hand joints. Continue Tylenol for pain as needed. Encouraged to drink plenty of fluids. Check BMP & A1c every 3 months, next in April. Continue other meds as per Rx List. Recheck 1 month. 50 minutes spent on visit, 30 minutes involved education/counseling regarding HTN, DM, weight, urinary, and DJD disease processes, treatment options, meds and coordination of care. Current Outpatient Medications   Medication Sig Dispense Refill    Dextromethorphan-guaiFENesin (MUCINEX DM)  MG TB12 Take 1 tablet by mouth every morning (before breakfast) And 1 tab in afternoon as needed. 40 tablet 3    levothyroxine (SYNTHROID) 100 MCG tablet Take 1 tablet by mouth Daily TAKE 1 TABLET BY MOUTH IN THE MORNING 30 MINUTES BEFORE BREAKFAST OR OTHER MEDICATIONS. 90 tablet 3    escitalopram (LEXAPRO) 5 MG tablet Take 1 tablet by mouth daily 30 tablet 5    FLONASE 50 MCG/ACT nasal spray Two sprays to each nostril daily. 1 each 11    amLODIPine (NORVASC) 5 MG tablet TAKE 1 TABLET BY MOUTH AT  BEDTIME. 30 tablet 11    metoprolol succinate (TOPROL XL) 25 MG extended release tablet TAKE 1 TABLET BY MOUTH AT  BEDTIME.  90 tablet 3    spironolactone (ALDACTONE) 25 MG tablet TAKE 1 TABLET BY MOUTH DAILY 30 tablet 11    loratadine (CLARITIN) 10 MG tablet TAKE 1 TABLET BY MOUTH ONCE A DAY 90 tablet 3    polyethylene glycol (GLYCOLAX) 17 GM/SCOOP powder Take 17 g by mouth daily as needed (constipation) 024 g 5    folic acid (FOLVITE) 437 MCG tablet TAKE 1 TABLET BY MOUTH IN  THE MORNING. 30 tablet 11    metoprolol succinate (TOPROL XL) 50 MG extended release tablet TAKE 1 TABLET BY MOUTH DAILY 30 tablet 7    ipratropium (ATROVENT) 0.06 % nasal spray 2 sprays by Each Nostril route 4 times daily as needed for Rhinitis 1 Bottle 5    acetaminophen (ACETAMINOPHEN EXTRA STRENGTH) 500 MG tablet TAKE 1 TABLET BY MOUTH FOUR TIMES DAILY AS NEEDED FOR PAIN. 120 tablet 11    aspirin (ASPIR-LOW) 81 MG EC tablet TAKE 1 TABLET BY MOUTH ONCE DAILY 30 tablet 11    Incontinence Supply Disposable MISC 2 each by Does not apply route as needed (Incontinence) Use prn. Dispense 2 cases. 11 Refills. Size large 2 each 11    vitamin D (CHOLECALCIFEROL) 50 MCG (2000 UT) TABS tablet Take 1 tablet by mouth daily      simvastatin (ZOCOR) 10 MG tablet TAKE 1 TABLET BY MOUTH AT  BEDTIME. 30 tablet 11    calcium carbonate-vitamin D 600-200 MG-UNIT TABS Take 1 tablet by mouth 2 times daily 60 tablet 11    Omega-3 Fatty Acids (FISH OIL) 1200 MG CAPS TAKE 1 CAPSULE BY MOUTH IN THE MORNING. 30 capsule 11     No current facility-administered medications for this visit. Return in about 1 month (around 2/10/2023). An electronic signature was used to authenticate this note.     --Fatimah Aburto MD on 1/10/2023 at 9:43 AM

## 2023-01-10 ENCOUNTER — OFFICE VISIT (OUTPATIENT)
Dept: PRIMARY CARE CLINIC | Age: 88
End: 2023-01-10

## 2023-01-10 VITALS
BODY MASS INDEX: 32.95 KG/M2 | HEART RATE: 60 BPM | WEIGHT: 193 LBS | HEIGHT: 64 IN | RESPIRATION RATE: 20 BRPM | TEMPERATURE: 97.6 F | DIASTOLIC BLOOD PRESSURE: 56 MMHG | SYSTOLIC BLOOD PRESSURE: 104 MMHG | OXYGEN SATURATION: 97 %

## 2023-01-10 DIAGNOSIS — E11.9 DIABETES MELLITUS TYPE 2, DIET-CONTROLLED (HCC): Chronic | ICD-10-CM

## 2023-01-10 DIAGNOSIS — N32.81 OVERACTIVE BLADDER: ICD-10-CM

## 2023-01-10 DIAGNOSIS — I10 HTN (HYPERTENSION), BENIGN: Primary | Chronic | ICD-10-CM

## 2023-01-10 DIAGNOSIS — M15.9 PRIMARY OSTEOARTHRITIS INVOLVING MULTIPLE JOINTS: ICD-10-CM

## 2023-01-10 RX ORDER — GUAIFENESIN AND DEXTROMETHORPHAN HYDROBROMIDE 600; 30 MG/1; MG/1
1 TABLET, EXTENDED RELEASE ORAL
Qty: 40 TABLET | Refills: 3
Start: 2023-01-10

## 2023-01-10 ASSESSMENT — PATIENT HEALTH QUESTIONNAIRE - PHQ9
3. TROUBLE FALLING OR STAYING ASLEEP: 0
1. LITTLE INTEREST OR PLEASURE IN DOING THINGS: 0
SUM OF ALL RESPONSES TO PHQ QUESTIONS 1-9: 0
7. TROUBLE CONCENTRATING ON THINGS, SUCH AS READING THE NEWSPAPER OR WATCHING TELEVISION: 0
5. POOR APPETITE OR OVEREATING: 0
4. FEELING TIRED OR HAVING LITTLE ENERGY: 0
SUM OF ALL RESPONSES TO PHQ QUESTIONS 1-9: 0
SUM OF ALL RESPONSES TO PHQ QUESTIONS 1-9: 0
SUM OF ALL RESPONSES TO PHQ9 QUESTIONS 1 & 2: 0
SUM OF ALL RESPONSES TO PHQ QUESTIONS 1-9: 0
9. THOUGHTS THAT YOU WOULD BE BETTER OFF DEAD, OR OF HURTING YOURSELF: 0
6. FEELING BAD ABOUT YOURSELF - OR THAT YOU ARE A FAILURE OR HAVE LET YOURSELF OR YOUR FAMILY DOWN: 0
8. MOVING OR SPEAKING SO SLOWLY THAT OTHER PEOPLE COULD HAVE NOTICED. OR THE OPPOSITE, BEING SO FIGETY OR RESTLESS THAT YOU HAVE BEEN MOVING AROUND A LOT MORE THAN USUAL: 0
2. FEELING DOWN, DEPRESSED OR HOPELESS: 0
10. IF YOU CHECKED OFF ANY PROBLEMS, HOW DIFFICULT HAVE THESE PROBLEMS MADE IT FOR YOU TO DO YOUR WORK, TAKE CARE OF THINGS AT HOME, OR GET ALONG WITH OTHER PEOPLE: 0

## 2023-02-10 ENCOUNTER — OFFICE VISIT (OUTPATIENT)
Dept: CARDIOLOGY CLINIC | Age: 88
End: 2023-02-10
Payer: COMMERCIAL

## 2023-02-10 VITALS
BODY MASS INDEX: 33.02 KG/M2 | WEIGHT: 193.4 LBS | DIASTOLIC BLOOD PRESSURE: 82 MMHG | SYSTOLIC BLOOD PRESSURE: 136 MMHG | HEART RATE: 60 BPM | RESPIRATION RATE: 16 BRPM | HEIGHT: 64 IN

## 2023-02-10 DIAGNOSIS — Z95.0 PACEMAKER: ICD-10-CM

## 2023-02-10 DIAGNOSIS — M50.30 DDD (DEGENERATIVE DISC DISEASE), CERVICAL: ICD-10-CM

## 2023-02-10 DIAGNOSIS — I38 VHD (VALVULAR HEART DISEASE): ICD-10-CM

## 2023-02-10 DIAGNOSIS — E66.09 NON MORBID OBESITY DUE TO EXCESS CALORIES: ICD-10-CM

## 2023-02-10 DIAGNOSIS — Z86.79 HISTORY OF COMPLETE HEART BLOCK: Primary | ICD-10-CM

## 2023-02-10 DIAGNOSIS — Z91.81 H/O FALL: ICD-10-CM

## 2023-02-10 DIAGNOSIS — E03.9 HYPOTHYROIDISM, UNSPECIFIED TYPE: ICD-10-CM

## 2023-02-10 DIAGNOSIS — E04.9 NODULAR GOITER: ICD-10-CM

## 2023-02-10 DIAGNOSIS — I10 ESSENTIAL HYPERTENSION: ICD-10-CM

## 2023-02-10 DIAGNOSIS — R26.9 GAIT DIFFICULTY: ICD-10-CM

## 2023-02-10 DIAGNOSIS — Z79.899 ON STATIN THERAPY: ICD-10-CM

## 2023-02-10 DIAGNOSIS — Z86.39 H/O HYPERLIPIDEMIA: ICD-10-CM

## 2023-02-10 DIAGNOSIS — J45.20 MILD INTERMITTENT ASTHMA WITHOUT COMPLICATION: ICD-10-CM

## 2023-02-10 DIAGNOSIS — R29.898 WEAKNESS OF BOTH LOWER EXTREMITIES: ICD-10-CM

## 2023-02-10 DIAGNOSIS — E11.9 DIABETES MELLITUS TYPE 2, DIET-CONTROLLED (HCC): ICD-10-CM

## 2023-02-10 DIAGNOSIS — E87.1 HYPONATREMIA: ICD-10-CM

## 2023-02-10 DIAGNOSIS — N18.31 STAGE 3A CHRONIC KIDNEY DISEASE (HCC): ICD-10-CM

## 2023-02-10 DIAGNOSIS — Z87.898 H/O SYNCOPE: ICD-10-CM

## 2023-02-10 DIAGNOSIS — M15.9 PRIMARY OSTEOARTHRITIS INVOLVING MULTIPLE JOINTS: ICD-10-CM

## 2023-02-10 PROCEDURE — 1090F PRES/ABSN URINE INCON ASSESS: CPT | Performed by: INTERNAL MEDICINE

## 2023-02-10 PROCEDURE — G8484 FLU IMMUNIZE NO ADMIN: HCPCS | Performed by: INTERNAL MEDICINE

## 2023-02-10 PROCEDURE — 99213 OFFICE O/P EST LOW 20 MIN: CPT | Performed by: INTERNAL MEDICINE

## 2023-02-10 PROCEDURE — 1036F TOBACCO NON-USER: CPT | Performed by: INTERNAL MEDICINE

## 2023-02-10 PROCEDURE — 93000 ELECTROCARDIOGRAM COMPLETE: CPT | Performed by: INTERNAL MEDICINE

## 2023-02-10 PROCEDURE — 1123F ACP DISCUSS/DSCN MKR DOCD: CPT | Performed by: INTERNAL MEDICINE

## 2023-02-10 PROCEDURE — G8427 DOCREV CUR MEDS BY ELIG CLIN: HCPCS | Performed by: INTERNAL MEDICINE

## 2023-02-10 PROCEDURE — G8417 CALC BMI ABV UP PARAM F/U: HCPCS | Performed by: INTERNAL MEDICINE

## 2023-02-12 NOTE — PROGRESS NOTES
2/13/2023    Home visit medically necessary in lieu of an office visit due to: [facility] resident, uses walker, difficult to get out. HPI:  Patient says she is doing good. She began PT and feels it is helping. She continues to have occasional coughing spells but Mucinex DM helps. Her allergies are doing okay with Flonase. Her knees, hands and feet DJD has not been bothering much currently. She uses BioFreeze and takes Tylenol which help. Her legs swell if she doesn't elevate them. She tries to elevate her legs when she is in her room. She says she feels okay on Lexapro. Her overactive bladder bothers her at times. She does Kegel exercises sometimes. She has urine still leaking out occasionally. She wears Depends. She has been moving her bowels well. She has not had any falls in this month. REVIEW OF SYSTEMS:  GENERAL: Appetite good. No weight change, generally healthy, no change in strength or exercise tolerance. CARDIOVASCULAR: No chest pains, no murmurs, no palpitations, no syncope, no orthopnea. LEGS HAVE BEEN SWELLING. RESPIRATORY: No shortness of breath, no pain with breathing, no wheezing, no hemoptysis, no cough. BREASTS:  No lumps, discharge or pain. GASTROINTESTINAL: No change in appetite, no dysphagia, no heartburn, no abdominal pains, no diarrhea, no bowel habit changes, no emesis, no melena, no hemorrhoids. CONSTIPATION SOMETIMES. GENITOURINARY: No incontinence or retention, no urinary urgency, no frequent UTIs, no dysuria, no change in nature of urine. STRESS INCONTINENCE AT TIMES. MUSCULOSKELETAL: No swelling or redness of joints, no limitation of range of motion, no weakness or numbness. HAS HAD JT PAINS BUT NOT NOW. NEURO/PSYCH: No weakness, no tremor, no seizures, no changes in mentation, no ataxia. No depressive symptoms, no changes in sleep habits, no changes in thought content. MEMORY PROBLEMS. All other systems negative.     Allergies   Allergen Reactions    Flomax [Tamsulosin Hcl] Other (See Comments)     Causes chest pain    Ace Inhibitors Swelling     Causes lip swelling    Alendronate Sodium      Other reaction(s): Unknown    Flomax [Tamsulosin]     Alendronate Sodium Other (See Comments)     Pt unsure of allergies    Lasix [Furosemide] Other (See Comments)     weakness    Lisinopril Rash and Swelling    Penicillins Rash     Rash    Propolis Other (See Comments)    Vioxx [Rofecoxib] Other (See Comments)     Past Medical History:   Diagnosis Date    Asthma     Atrioventricular (AV) dissociation     AV block, 1st degree 2/11/2015    Diabetes mellitus (HCC)     GERD (gastroesophageal reflux disease)     Herniated disc     Cervical    Hyperlipidemia     Hypertension     Mild aortic regurgitation 11/5/13    Mitral regurgitation 11/5/13    mild-moderate    Nontoxic uninodular goiter     OA (osteoarthritis of spine)     cervical spine    Obesity      Past Surgical History:   Procedure Laterality Date    A-V CARDIAC PACEMAKER INSERTION Left 06/13/2016    Dual chamber pacemaker [address] by Dr Shobha Wilson ([address] [place] [address])       Social History     Socioeconomic History    Marital status:      Spouse name: Not on file    Number of children: None    Years of education: Not on file    Highest education level: Not on file   Occupational History    Not on file   Tobacco Use    Smoking status: Never Smoker    Smokeless tobacco: Never Used   Substance and Sexual Activity    Alcohol use: No     Frequency: Never     Binge frequency: Never     Comment:      Drug use: No    Sexual activity: Not Currently   Social History Narrative    1 cup tea daily; occ pop; no coffee      No family history on file.     Vaccine Date   COVID-19 12/23/20, 1/13/21   Influenza 10/12/21   Prevnar 13 12/29/2015   Pneumovax 23 1/12/2017     Code Status Full  7/30/21     PHYSICAL EXAM:     Vitals:    02/13/23 0921   BP: 138/74   Pulse: 65   Resp: 18   Temp: 97.8 °F (36.6 °C)   TempSrc: Infrared SpO2: 98%   Weight: 194 lb 6.4 oz (88.2 kg)   Height: 5' 4\" (1.626 m)     Estimated body mass index is 33.37 kg/m² as calculated from the following:    Height as of this encounter: 5' 4\" (1.626 m). Weight as of this encounter: 194 lb 6.4 oz (88.2 kg). GEN:  elderly WDWN female patient seated in recliner in NAD. HEAD:  atraumatic, normocephalic. EYES:  EOMI, PERRL, no cataracts, conjunctivae appear normal.  ENT:   Good hearing, EACs without wax, TM's normal, nasal septum midline, no significant congestion, oral cavity without lesions, poor-fair dentition, no dentures. NECK:  fair-good ROM, no palpable masses, no carotid bruits, no JVD. LUNGS:  clear to ausc, no rales, rhonchi or wheezes. HEART:  RRR, no murmurs or gallops. ABD:  soft, non-tender to palp, no palp masses or HSM, normal BS. BACK:  no scoliosis or kyphosis, non-tender to palp. EXTREMITIES: Trace edema, no ulcerations, varicosities or erythema. No gross deformities. MUSCULOSKELETAL: Good ROM of all joints, non tender to palp and or with movement. SKIN:  No ulcerations or breakdown, rash, ecchymosis, or other lesions. NEURO:  No tremor, motor UEs 5/5 LEs  5/5, sensory normal, able to stand and walk slowly using rollator with mild ataxia. PSYCH:  Pleasant and cooperative. Fluid speech, oriented to person, place and time. No delusional statements. Medications reviewed. Labs: 7/8/22 Na 129, GFR 47  6/13/22 HH 13/39, Na 127, GFR 47, LFTs nl, TSH 1.4, , HDL 51, A1c 6.4      ASSESSMENT:  Elderly female has HTN (hypertension), benign; Diabetes mellitus type 2, diet-controlled ([facility] [address] DDD (degenerative disc disease), cervical; Non-rheumatic mitral regurgitation; AI (aortic insufficiency); Pacemaker; Hyperlipidemia LDL goal <100; Acquired hypothyroidism; Syncope and collapse; Hyponatremia; Primary osteoarthritis involving multiple joints; Mild intermittent asthma without complication;  History of complete heart block; Depression; Difficulty walking; Chronic fatigue; Overactive bladder; Constipation; Urinary incontinence; and Seasonal allergic rhinitis on their problem list.     Donnie was seen today for hypertension and joint pain. Diagnoses and all orders for this visit:    HTN (hypertension), benign    Pacemaker    Diabetes mellitus type 2, diet-controlled ([facility] [address]    Primary osteoarthritis involving multiple joints      PLAN:    Check labs. Continue Mucinex DM q AM straight and in PM prn. Continue Lexapro for depressed mood. Encouraged to elevate legs and use compression stockings. Discussed diet and activity level. Continue to use BioFreeze on painful hand joints. Continue Tylenol for pain as needed. Encouraged to drink plenty of fluids. Check BMP & A1c every 3 months, next in April. Continue other meds as per Rx List. Recheck 1 month. 40 minutes spent on visit, 25 minutes involved education/counseling regarding HTN, DM, weight, urinary, and DJD disease processes, treatment options, meds and coordination of care. Current Outpatient Medications   Medication Sig Dispense Refill    Dextromethorphan-guaiFENesin (MUCINEX DM)  MG TB12 Take 1 tablet by mouth every morning (before breakfast) And 1 tab in afternoon as needed. 40 tablet 3    levothyroxine (SYNTHROID) 100 MCG tablet Take 1 tablet by mouth Daily TAKE 1 TABLET BY MOUTH IN THE MORNING 30 MINUTES BEFORE BREAKFAST OR OTHER MEDICATIONS. 90 tablet 3    escitalopram (LEXAPRO) 5 MG tablet Take 1 tablet by mouth daily 30 tablet 5    FLONASE 50 MCG/ACT nasal spray Two sprays to each nostril daily. 1 each 11    amLODIPine (NORVASC) 5 MG tablet TAKE 1 TABLET BY MOUTH AT  BEDTIME. 30 tablet 11    metoprolol succinate (TOPROL XL) 25 MG extended release tablet TAKE 1 TABLET BY MOUTH AT  BEDTIME.  90 tablet 3    spironolactone (ALDACTONE) 25 MG tablet TAKE 1 TABLET BY MOUTH DAILY 30 tablet 11    loratadine (CLARITIN) 10 MG tablet TAKE 1 TABLET BY MOUTH ONCE A DAY 90 tablet 3 polyethylene glycol (GLYCOLAX) 17 GM/SCOOP powder Take 17 g by mouth daily as needed (constipation) 366 g 5    folic acid (FOLVITE) 119 MCG tablet TAKE 1 TABLET BY MOUTH IN  THE MORNING. 30 tablet 11    metoprolol succinate (TOPROL XL) 50 MG extended release tablet TAKE 1 TABLET BY MOUTH DAILY 30 tablet 7    ipratropium (ATROVENT) 0.06 % nasal spray 2 sprays by Each Nostril route 4 times daily as needed for Rhinitis 1 Bottle 5    acetaminophen (ACETAMINOPHEN EXTRA STRENGTH) 500 MG tablet TAKE 1 TABLET BY MOUTH FOUR TIMES DAILY AS NEEDED FOR PAIN. 120 tablet 11    aspirin (ASPIR-LOW) 81 MG EC tablet TAKE 1 TABLET BY MOUTH ONCE DAILY 30 tablet 11    Incontinence Supply Disposable MISC 2 each by Does not apply route as needed (Incontinence) Use prn. Dispense 2 cases. 11 Refills. Size large 2 each 11    vitamin D (CHOLECALCIFEROL) 50 MCG (2000 UT) TABS tablet Take 1 tablet by mouth daily      simvastatin (ZOCOR) 10 MG tablet TAKE 1 TABLET BY MOUTH AT  BEDTIME. 30 tablet 11    calcium carbonate-vitamin D 600-200 MG-UNIT TABS Take 1 tablet by mouth 2 times daily 60 tablet 11    Omega-3 Fatty Acids (FISH OIL) 1200 MG CAPS TAKE 1 CAPSULE BY MOUTH IN THE MORNING. 30 capsule 11     No current facility-administered medications for this visit. Return in about 1 month (around 3/13/2023). An electronic signature was used to authenticate this note.     --Luli Maier MD on 2/13/2023 at 9:59 AM

## 2023-02-13 ENCOUNTER — OFFICE VISIT (OUTPATIENT)
Dept: PRIMARY CARE CLINIC | Age: 88
End: 2023-02-13
Payer: COMMERCIAL

## 2023-02-13 ENCOUNTER — TELEPHONE (OUTPATIENT)
Dept: NON INVASIVE DIAGNOSTICS | Age: 88
End: 2023-02-13

## 2023-02-13 VITALS
DIASTOLIC BLOOD PRESSURE: 74 MMHG | HEIGHT: 64 IN | SYSTOLIC BLOOD PRESSURE: 138 MMHG | WEIGHT: 194.4 LBS | BODY MASS INDEX: 33.19 KG/M2 | RESPIRATION RATE: 18 BRPM | HEART RATE: 65 BPM | TEMPERATURE: 97.8 F | OXYGEN SATURATION: 98 %

## 2023-02-13 DIAGNOSIS — I10 HTN (HYPERTENSION), BENIGN: Primary | Chronic | ICD-10-CM

## 2023-02-13 DIAGNOSIS — M15.9 PRIMARY OSTEOARTHRITIS INVOLVING MULTIPLE JOINTS: ICD-10-CM

## 2023-02-13 DIAGNOSIS — Z95.0 PACEMAKER: Chronic | ICD-10-CM

## 2023-02-13 DIAGNOSIS — E11.9 DIABETES MELLITUS TYPE 2, DIET-CONTROLLED (HCC): Chronic | ICD-10-CM

## 2023-02-13 PROCEDURE — 1090F PRES/ABSN URINE INCON ASSESS: CPT | Performed by: FAMILY MEDICINE

## 2023-02-13 PROCEDURE — 99349 HOME/RES VST EST MOD MDM 40: CPT | Performed by: FAMILY MEDICINE

## 2023-02-13 PROCEDURE — G8484 FLU IMMUNIZE NO ADMIN: HCPCS | Performed by: FAMILY MEDICINE

## 2023-02-13 PROCEDURE — G8417 CALC BMI ABV UP PARAM F/U: HCPCS | Performed by: FAMILY MEDICINE

## 2023-02-13 PROCEDURE — 1123F ACP DISCUSS/DSCN MKR DOCD: CPT | Performed by: FAMILY MEDICINE

## 2023-02-13 SDOH — ECONOMIC STABILITY: FOOD INSECURITY: WITHIN THE PAST 12 MONTHS, THE FOOD YOU BOUGHT JUST DIDN'T LAST AND YOU DIDN'T HAVE MONEY TO GET MORE.: NEVER TRUE

## 2023-02-13 SDOH — ECONOMIC STABILITY: HOUSING INSECURITY
IN THE LAST 12 MONTHS, WAS THERE A TIME WHEN YOU DID NOT HAVE A STEADY PLACE TO SLEEP OR SLEPT IN A SHELTER (INCLUDING NOW)?: NO

## 2023-02-13 SDOH — ECONOMIC STABILITY: FOOD INSECURITY: WITHIN THE PAST 12 MONTHS, YOU WORRIED THAT YOUR FOOD WOULD RUN OUT BEFORE YOU GOT MONEY TO BUY MORE.: NEVER TRUE

## 2023-02-13 SDOH — ECONOMIC STABILITY: INCOME INSECURITY: HOW HARD IS IT FOR YOU TO PAY FOR THE VERY BASICS LIKE FOOD, HOUSING, MEDICAL CARE, AND HEATING?: NOT HARD AT ALL

## 2023-02-13 NOTE — TELEPHONE ENCOUNTER
----- Message from Maxwell Batres RN sent at 2/10/2023  3:03 PM EST -----  Hi Other Tuan Dial,     This cute young lady was in the office today with her niece, Samara John. Ph # [phone]. She needs her pacer checked- or at least that is the story I was given. :) Can you call Lorri High to schedule please and thank you?      Jonny Reynolds

## 2023-02-13 NOTE — TELEPHONE ENCOUNTER
Spoke to [place] (juvencio) regarding scheduling an appointment for Mrs Chow's device check. Mrs Mary Alice Velásquezjacobsiva follows with cardiology for her device checks not EP, Dr Naomy Vazquez follows her device.  She will bring her in on 02/27/2023 at 10am

## 2023-02-13 NOTE — TELEPHONE ENCOUNTER
Called the number and not able to leave a voice mail its not set up yet.   Patient seen Dr Ilene Tarango in the past.,

## 2023-02-13 NOTE — TELEPHONE ENCOUNTER
Not sure if patient needs a device check. Patient has a reading on 12/7/2022 she is a [place]. Brigitte is going to call back to let us know if patient does not have a box in her room.

## 2023-02-13 NOTE — PROGRESS NOTES
OFFICE VISIT        PRIMARY CARE PHYSICIAN:    Dina Jain MD         ALLERGIES / SENSITIVITIES:    Allergies   Allergen Reactions    Flomax [Tamsulosin Hcl] Other (See Comments)     Causes chest pain    Ace Inhibitors Swelling     Causes lip swelling    Alendronate Sodium      Other reaction(s): Unknown    Flomax [Tamsulosin]     Alendronate Sodium Other (See Comments)     Pt unsure of allergies    Lasix [Furosemide] Other (See Comments)     weakness    Lisinopril Rash and Swelling    Penicillins Rash     Rash    Propolis Other (See Comments)    Vioxx [Rofecoxib] Other (See Comments)          REVIEWED MEDICATIONS:      Current Outpatient Medications:     Dextromethorphan-guaiFENesin (MUCINEX DM)  MG TB12, Take 1 tablet by mouth every morning (before breakfast) And 1 tab in afternoon as needed. , Disp: 40 tablet, Rfl: 3    levothyroxine (SYNTHROID) 100 MCG tablet, Take 1 tablet by mouth Daily TAKE 1 TABLET BY MOUTH IN THE MORNING 30 MINUTES BEFORE BREAKFAST OR OTHER MEDICATIONS., Disp: 90 tablet, Rfl: 3    escitalopram (LEXAPRO) 5 MG tablet, Take 1 tablet by mouth daily, Disp: 30 tablet, Rfl: 5    FLONASE 50 MCG/ACT nasal spray, Two sprays to each nostril daily. , Disp: 1 each, Rfl: 11    amLODIPine (NORVASC) 5 MG tablet, TAKE 1 TABLET BY MOUTH AT  BEDTIME., Disp: 30 tablet, Rfl: 11    metoprolol succinate (TOPROL XL) 25 MG extended release tablet, TAKE 1 TABLET BY MOUTH AT  BEDTIME., Disp: 90 tablet, Rfl: 3    spironolactone (ALDACTONE) 25 MG tablet, TAKE 1 TABLET BY MOUTH DAILY, Disp: 30 tablet, Rfl: 11    loratadine (CLARITIN) 10 MG tablet, TAKE 1 TABLET BY MOUTH ONCE A DAY, Disp: 90 tablet, Rfl: 3    polyethylene glycol (GLYCOLAX) 17 GM/SCOOP powder, Take 17 g by mouth daily as needed (constipation), Disp: 252 g, Rfl: 5    folic acid (FOLVITE) 476 MCG tablet, TAKE 1 TABLET BY MOUTH IN  THE MORNING., Disp: 30 tablet, Rfl: 11    metoprolol succinate (TOPROL XL) 50 MG extended release tablet, TAKE 1 TABLET BY MOUTH DAILY, Disp: 30 tablet, Rfl: 7    ipratropium (ATROVENT) 0.06 % nasal spray, 2 sprays by Each Nostril route 4 times daily as needed for Rhinitis, Disp: 1 Bottle, Rfl: 5    acetaminophen (ACETAMINOPHEN EXTRA STRENGTH) 500 MG tablet, TAKE 1 TABLET BY MOUTH FOUR TIMES DAILY AS NEEDED FOR PAIN., Disp: 120 tablet, Rfl: 11    aspirin (ASPIR-LOW) 81 MG EC tablet, TAKE 1 TABLET BY MOUTH ONCE DAILY, Disp: 30 tablet, Rfl: 11    Incontinence Supply Disposable MISC, 2 each by Does not apply route as needed (Incontinence) Use prn. Dispense 2 cases. 11 Refills. Size large, Disp: 2 each, Rfl: 11    vitamin D (CHOLECALCIFEROL) 50 MCG (2000 UT) TABS tablet, Take 1 tablet by mouth daily, Disp: , Rfl:     simvastatin (ZOCOR) 10 MG tablet, TAKE 1 TABLET BY MOUTH AT  BEDTIME., Disp: 30 tablet, Rfl: 11    calcium carbonate-vitamin D 600-200 MG-UNIT TABS, Take 1 tablet by mouth 2 times daily, Disp: 60 tablet, Rfl: 11    Omega-3 Fatty Acids (FISH OIL) 1200 MG CAPS, TAKE 1 CAPSULE BY MOUTH IN THE MORNING., Disp: 30 capsule, Rfl: 11        S: REASON FOR VISIT:    History of complete heart block, S/P pacemaker insertion, valvular heart disease and multiple risk factors for coronary artery disease. Donnie is a pleasant, 80-year-old lady with cardiovascular history as described below  She resides at [facility]. She ambulates only with the use of a Rolator and only for short distances because of bilateral lower extremity weakness. She has gait instability and is at the risk of falls. She denies chest pain or dyspnea with her limited activities. She denies orthopnea or PND's  She has bilateral lower extremity swelling, likely lymphedema  and this has not worsened  She denied the subjective feeling of palpitations. She denies dizziness, presyncope or syncope. Her pacemaker has been checked routinely and last pacemaker check was on 1/3/2023.          REVIEW OF SYSTEMS:    CONSTITUTIONAL:  Denies fevers, chills, night sweats or fatigue. HEENT:  Denies any unusual headaches. Denies recent changes in hearing or vision. Denies dysphagia, hoarseness, hemoptysis, hematemesis or epistaxis. ENDOCRINE:  Denies polyphagia, polydipsia or polyuria. Denies heat or cold intolerance. MUSCULOSKELETAL:  She complains of bilateral lower extremity weakness but denies any arthralgias or myalgias. SKIN:  Denies rashes, ulcers or itching. HEME/LYMPH:  Denies any palpable lymph nodes, bleeding or easy bruisability. HEART:  As above. LUNGS:  Denies cough or sputum production. GI: Denies abdominal pain, nausea, vomiting, diarrhea, constipation, rectal bleeding or tarry stools. :  Denies hematuria or dysuria. PSYCHIATRIC:  Denies mood changes, anxiety or depression. NEUROLOGIC:  Denies memory loss, motor weakness, numbness, tingling or tremors. CARDIOVASCULAR HISTORY:    1. Hypertension. 2.  Diabetes mellitus. 3.  Hyperlipidemia. 4.  Obesity. 5.  Echocardiogram done on 11/5/2013 showed normal left ventricular size with mild concentric left ventricular hypertrophy with proximal septal thickening with normal left ventricular wall motion with hyperdynamic left ventricular systolic function with an ejection fraction of more than 75% with stage I left ventricular diastolic dysfunction. Normal right ventricular size and function. Mild to moderate mitral regurgitation. Mild aortic regurgitation. 6.  Lexiscan nuclear stress test done on 11/5/2013 was probably a normal study showing a small, reversible defect involving the anterolateral wall of the left ventricle near the apex, more consistent with shifting breast attenuation artifact and less likely the result of stress induced ischemia with the gated views showing no regional wall motion abnormality with possible hyperdynamic left ventricular systolic function. 7. Ace inhibitor allergy:  Causes lip swelling.    8. 2-D echocardiogram February 22, 2016 proximal septal thickening last hypertrophy and EF 75% and stage I diastolic dysfunction with mild mitral annular calcification and aortic valve appears mildly sclerotic and is trileaflet and there is mild aortic regurgitation and fat pad versus small pericardial effusion cannot be ruled out  9. Lexiscan stress test February 22, 2016 within normal limits showing no evidence of scars or stress-induced ischemia and the gated view showing no regional wall motion abnormality with possible hyperdynamic left ventricular systolic function with an ejection fraction of 87%and unchanged significantly from November 2013  10. Complete heart block with junctional escape rhythm with rates in the 30's and 40's and as low as 20 1. Status post dual chamber pacemaker by Dr. Andrew Garza June 13, 2016 with a [employer] HALE model number 12/3/01 serial #[id number]  11. Sarajane Poot nuclear stress test done on 05/20/2018 showed no evidence of stress-induced ischemia with an ejection fraction of 71%. 12.  Limited echo done on 07/02/2018 was reported as showing an ejection fraction of 50-55% with sigmoid septum with moderate asymmetric septal hypertrophy with normal right ventricular structure and function and no evidence of pericardial effusion. 13.  Syncopal episode in 07/2018: Thought to be vasovagal.   14.  Echocardiogram done on 11/22/2021 showed normal left ventricular size with moderate asymmetric septal hypertrophy with no THANIA with no regional wall motion abnormality with an estimated ejection fraction of 70 +/-% with stage 2 left ventricular diastolic dysfunction, normal right ventricle size and function  Pacemaker leads were visualized in the right ventricle/right atrium. Normal size atria.     Moderate mitral annular calcification with focal calcification of the mitral valve leaflets without mitral stenosis with trace to mild mitral regurgitation  Mildly sclerotic aortic valve without hemodynamically significant stenosis and with mild aortic regurgitation. Sclerotic and calcified aortic root. PAST MEDICAL HISTORY:  1. As under cardiovascular history. 2.  Nontoxic nodular goiter. 3.  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 4.  S/P complete hysterectomy in .  5.  Asthma. 6.  GERD. FAMILY HISTORY:  Mother  in her 14'S from complications of pancreatic cancer: Had history of hypertension. Father  at age 67 from \"heart problems\". Had rheumatoid arthritis and was a smoker. SOCIAL HISTORY:  Patient denies tobacco, alcohol or illicit drug use. O:  COMPLETE PHYSICAL EXAM:      /82   Pulse 60   Resp 16   Ht 5' 4\" (1.626 m)   Wt 193 lb 6.4 oz (87.7 kg)   LMP  (LMP Unknown)   BMI 33.20 kg/m²      General:          Well-developed, well-nourished, elderly lady in no acute distress. HEENT:           Normocephalic and atraumatic head. No JVD. No carotid bruits. Carotid upstrokes normal bilaterally. No thyromegaly. Sclerae not icteric. No xanthelasmas. Mucous membranes moist.  Chest:             Symmetrical and nontender. No deformities site of pacemaker upper chest unremarkable. Lungs:             Clear to auscultation bilaterally. Heart:              Normal S1 and S2. No S3 or S4. Grade 1/6 systolic murmur heard at the right upper sternal border. Abdomen:       Soft, nontender without organomegaly or masses. No bruits. Normal bowel sounds. Extremities: Thick tender shins. Nonpitting edema bilaterally  Pulses palpable. Skin:                Normal turgor. No rashes or ulcers noted. Neurologic:      Oriented x3. No focal deficits detected. REVIEW OF DIAGNOSTIC TESTS:     Blood tests from 2022 reviewed: Total cholesterol 118. Triglycerides 83, HDL 51, LDL 50, hemoglobin A1C 6.4, TSH normal.  CBC within normal limits.   Sodium 127, BUN 34, creatinine 1.3, potassium 4.8,  GFR 47, LFT's normal.   EKG done today showed AV sequential pacing. ASSESSMENT / DIAGNOSIS:   1. Hypertension: Adequately controlled. 2.  Diabetes mellitus: Diet controlled. 3.  Hyperlipidemia: On statin therapy. 4.  Nodular goiter with hypothyroidism: On replacement therapy. 5.  Degenerative disc disease of the cervical spine. 6.  Osteoarthritis. 7.  History of complete heart block, status post dual chamber pacemaker insertion. With bursts of nonsustained ventricular tachycardia x2     8. Asthma. 9.  Valvular heart disease (mitral regurgitation and aortic regurgitation). 10.  History of syncopal episode:  ? Vasovagal.  11. Bilateral lower extremity weakness with history of fall: Uses a Rolator to ambulate. 12. Gait difficulty. 13. Non morbid obesity. 14.  Chronic kidney disease. 15.  Hyponatremia. TREATMENT PLAN:   Reassure. Continue current cardiac medications. Routine pacemaker follow up. Follow up with Cardiology in 1 year or on a prn basis. Fe Helm.  [place] [id number]  [phone]

## 2023-02-15 DIAGNOSIS — E03.9 ACQUIRED HYPOTHYROIDISM: ICD-10-CM

## 2023-02-15 DIAGNOSIS — I10 HTN (HYPERTENSION), BENIGN: Chronic | ICD-10-CM

## 2023-02-15 DIAGNOSIS — Z76.0 MEDICATION REFILL: ICD-10-CM

## 2023-02-15 DIAGNOSIS — J45.20 MILD INTERMITTENT ASTHMA WITHOUT COMPLICATION: ICD-10-CM

## 2023-02-15 LAB
AVERAGE GLUCOSE: 126
BASOPHILS ABSOLUTE: NORMAL
BASOPHILS RELATIVE PERCENT: NORMAL
BUN BLDV-MCNC: 24 MG/DL
CALCIUM SERPL-MCNC: 8.9 MG/DL
CHLORIDE BLD-SCNC: 97 MMOL/L
CO2: 23 MMOL/L
CREAT SERPL-MCNC: 1.1 MG/DL
EGFR: 57
EOSINOPHILS ABSOLUTE: NORMAL
EOSINOPHILS RELATIVE PERCENT: NORMAL
GLUCOSE BLD-MCNC: 82 MG/DL
HBA1C MFR BLD: 6 %
HCT VFR BLD CALC: 41.8 % (ref 36–46)
HEMOGLOBIN: 13.7 G/DL (ref 12–16)
LYMPHOCYTES ABSOLUTE: NORMAL
LYMPHOCYTES RELATIVE PERCENT: NORMAL
MCH RBC QN AUTO: 29.9 PG
MCHC RBC AUTO-ENTMCNC: 32.8 G/DL
MCV RBC AUTO: 91.1 FL
MONOCYTES ABSOLUTE: NORMAL
MONOCYTES RELATIVE PERCENT: NORMAL
NEUTROPHILS ABSOLUTE: NORMAL
NEUTROPHILS RELATIVE PERCENT: 82.2 %
PDW BLD-RTO: NORMAL %
PLATELET # BLD: 254 K/ΜL
PMV BLD AUTO: 9.4 FL
POTASSIUM SERPL-SCNC: 4.7 MMOL/L
RBC # BLD: 4.59 10^6/ΜL
SODIUM BLD-SCNC: 130 MMOL/L
WBC # BLD: 8.4 10^3/ML

## 2023-02-15 RX ORDER — LEVOTHYROXINE SODIUM 0.1 MG/1
100 TABLET ORAL DAILY
Qty: 90 TABLET | Refills: 3 | Status: SHIPPED | OUTPATIENT
Start: 2023-02-15

## 2023-02-15 RX ORDER — FLUTICASONE PROPIONATE 50 MCG
1 SPRAY, SUSPENSION (ML) NASAL DAILY
COMMUNITY
End: 2023-02-15 | Stop reason: SDUPTHER

## 2023-02-15 RX ORDER — AMLODIPINE BESYLATE 5 MG/1
TABLET ORAL
Qty: 30 TABLET | Refills: 11 | Status: SHIPPED | OUTPATIENT
Start: 2023-02-15

## 2023-02-15 RX ORDER — GUAIFENESIN AND DEXTROMETHORPHAN HYDROBROMIDE 600; 30 MG/1; MG/1
1 TABLET, EXTENDED RELEASE ORAL
Qty: 40 TABLET | Refills: 3 | Status: SHIPPED | OUTPATIENT
Start: 2023-02-15

## 2023-02-15 RX ORDER — FLUTICASONE PROPIONATE 50 MCG
1 SPRAY, SUSPENSION (ML) NASAL DAILY
Qty: 16 G | Refills: 11 | Status: SHIPPED | OUTPATIENT
Start: 2023-02-15

## 2023-02-15 RX ORDER — ASPIRIN 81 MG/1
TABLET ORAL
Qty: 30 TABLET | Refills: 11 | Status: SHIPPED | OUTPATIENT
Start: 2023-02-15

## 2023-02-15 RX ORDER — SPIRONOLACTONE 25 MG/1
TABLET ORAL
Qty: 30 TABLET | Refills: 11 | Status: SHIPPED | OUTPATIENT
Start: 2023-02-15

## 2023-02-15 RX ORDER — IPRATROPIUM BROMIDE 42 UG/1
2 SPRAY, METERED NASAL 4 TIMES DAILY PRN
Qty: 1 EACH | Refills: 5 | Status: SHIPPED | OUTPATIENT
Start: 2023-02-15

## 2023-02-15 RX ORDER — ACETAMINOPHEN 500 MG
TABLET ORAL
Qty: 120 TABLET | Refills: 11 | Status: SHIPPED | OUTPATIENT
Start: 2023-02-15

## 2023-02-15 RX ORDER — POLYETHYLENE GLYCOL 3350 17 G/17G
17 POWDER, FOR SOLUTION ORAL DAILY PRN
Qty: 510 G | Refills: 5 | Status: SHIPPED | OUTPATIENT
Start: 2023-02-15

## 2023-02-15 RX ORDER — METOPROLOL SUCCINATE 50 MG/1
TABLET, EXTENDED RELEASE ORAL
Qty: 30 TABLET | Refills: 11 | Status: SHIPPED | OUTPATIENT
Start: 2023-02-15

## 2023-02-15 RX ORDER — AMOXICILLIN 500 MG
CAPSULE ORAL
Qty: 30 CAPSULE | Refills: 11 | Status: SHIPPED | OUTPATIENT
Start: 2023-02-15

## 2023-02-15 RX ORDER — SIMVASTATIN 10 MG
TABLET ORAL
Qty: 30 TABLET | Refills: 11 | Status: SHIPPED | OUTPATIENT
Start: 2023-02-15

## 2023-02-15 RX ORDER — CHOLECALCIFEROL (VITAMIN D3) 50 MCG
2000 TABLET ORAL DAILY
Qty: 30 TABLET | Refills: 11 | Status: SHIPPED | OUTPATIENT
Start: 2023-02-15

## 2023-02-15 RX ORDER — ESCITALOPRAM OXALATE 5 MG/1
5 TABLET ORAL DAILY
Qty: 30 TABLET | Refills: 11 | Status: SHIPPED | OUTPATIENT
Start: 2023-02-15

## 2023-02-15 RX ORDER — LANOLIN ALCOHOL/MO/W.PET/CERES
CREAM (GRAM) TOPICAL
Qty: 30 TABLET | Refills: 11 | Status: SHIPPED | OUTPATIENT
Start: 2023-02-15

## 2023-02-15 RX ORDER — LORATADINE 10 MG/1
TABLET ORAL
Qty: 90 TABLET | Refills: 3 | Status: SHIPPED | OUTPATIENT
Start: 2023-02-15

## 2023-02-17 DIAGNOSIS — E11.9 DIABETES MELLITUS TYPE 2, DIET-CONTROLLED (HCC): Chronic | ICD-10-CM

## 2023-02-17 DIAGNOSIS — I10 HTN (HYPERTENSION), BENIGN: Chronic | ICD-10-CM

## 2023-02-20 ENCOUNTER — TELEPHONE (OUTPATIENT)
Dept: PRIMARY CARE CLINIC | Age: 88
End: 2023-02-20

## 2023-02-20 RX ORDER — METOPROLOL SUCCINATE 25 MG/1
TABLET, EXTENDED RELEASE ORAL
Qty: 90 TABLET | Refills: 3 | Status: SHIPPED | OUTPATIENT
Start: 2023-02-20

## 2023-02-20 NOTE — TELEPHONE ENCOUNTER
Oscar Garcia from [facility] called. There is a discrepancy on Hettie's Metorprolol. You escripted 50mg daily. The list from [address] had it listed as 25mg daily.   Both doses are on her Epic med list. What dose do you want her on?

## 2023-02-27 ENCOUNTER — NURSE ONLY (OUTPATIENT)
Dept: CARDIOLOGY CLINIC | Age: 88
End: 2023-02-27

## 2023-03-10 ENCOUNTER — TELEPHONE (OUTPATIENT)
Dept: PRIMARY CARE CLINIC | Age: 88
End: 2023-03-10

## 2023-03-10 NOTE — TELEPHONE ENCOUNTER
Fax from [address]. Donnie has a hx of Type 2 diabetes without complications. Not on any diabetic medication. Last glucose check was 2/15/23 with labs-126. 1/11/23-23. Is it ok to check blood sugars with monthly vitals?

## 2023-03-15 NOTE — PROGRESS NOTES
3/16/2023    Home visit medically necessary in lieu of an office visit due to: [facility], uses walker, difficult to get out. HPI:  Patient says she has been coughing all mouth, mostly non-productive. She has drainage in back of throat. She thinks Mucinex DM helps a little. She has not had SOB, fever or increased swelling. She finished PT and feels it helped. She has not been using Flonase. Her knees, hands and feet have not been bothering much currently. She uses BioFreeze and takes Tylenol which help. Her legs swell when she doesn't elevate them. She says she feels okay on Lexapro. Her overactive bladder bothers her at times. She does Kegel exercises sometimes. She has urinary incontinence and wears Depends. She has been moving her bowels well. She has not had any falls in this month. REVIEW OF SYSTEMS:  GENERAL: Appetite good. No weight change, generally healthy, no change in strength or exercise tolerance. CARDIOVASCULAR: No chest pains, no murmurs, no palpitations, no syncope, no orthopnea. LEGS HAVE BEEN SWELLING. RESPIRATORY: No shortness of breath, no pain with breathing, no wheezing, no hemoptysis, no cough. BREASTS:  No lumps, discharge or pain. GASTROINTESTINAL: No change in appetite, no dysphagia, no heartburn, no abdominal pains, no diarrhea, no bowel habit changes, no emesis, no melena, no hemorrhoids. CONSTIPATION SOMETIMES. GENITOURINARY: No incontinence or retention, no urinary urgency, no frequent UTIs, no dysuria, no change in nature of urine. STRESS INCONTINENCE AT TIMES. MUSCULOSKELETAL: No swelling or redness of joints, no limitation of range of motion, no weakness or numbness. HAS HAD JT PAINS BUT NOT NOW. NEURO/PSYCH: No weakness, no tremor, no seizures, no changes in mentation, no ataxia. No depressive symptoms, no changes in sleep habits, no changes in thought content. MEMORY PROBLEMS. All other systems negative.     Allergies   Allergen Reactions    Flomax [Tamsulosin Hcl] Other (See Comments)     Causes chest pain    Ace Inhibitors Swelling     Causes lip swelling    Alendronate Sodium      Other reaction(s): Unknown    Flomax [Tamsulosin]     Alendronate Sodium Other (See Comments)     Pt unsure of allergies    Lasix [Furosemide] Other (See Comments)     weakness    Lisinopril Rash and Swelling    Penicillins Rash     Rash    Propolis Other (See Comments)    Vioxx [Rofecoxib] Other (See Comments)     Past Medical History:   Diagnosis Date    Asthma     Atrioventricular (AV) dissociation     AV block, 1st degree 2/11/2015    Diabetes mellitus (HCC)     GERD (gastroesophageal reflux disease)     Herniated disc     Cervical    Hyperlipidemia     Hypertension     Mild aortic regurgitation 11/5/13    Mitral regurgitation 11/5/13    mild-moderate    Nontoxic uninodular goiter     OA (osteoarthritis of spine)     cervical spine    Obesity      Past Surgical History:   Procedure Laterality Date    A-V CARDIAC PACEMAKER INSERTION Left 06/13/2016    Dual chamber pacemaker [address] by Dr Kenneth Hinton ([address] [place] [address])       Social History     Socioeconomic History    Marital status:      Spouse name: Not on file    Number of children: None    Years of education: Not on file    Highest education level: Not on file   Occupational History    Not on file   Tobacco Use    Smoking status: Never Smoker    Smokeless tobacco: Never Used   Substance and Sexual Activity    Alcohol use: No     Frequency: Never     Binge frequency: Never     Comment:      Drug use: No    Sexual activity: Not Currently   Social History Narrative    1 cup tea daily; occ pop; no coffee      No family history on file.     Vaccine Date   COVID-19 12/23/20, 1/13/21   Influenza 10/12/21   Prevnar 13 12/29/2015   Pneumovax 23 1/12/2017     Code Status Full  7/30/21     PHYSICAL EXAM:     Vitals:    03/16/23 0915   BP: 134/78   Pulse: 64   Resp: 18   Temp: 97.8 °F (36.6 °C)   TempSrc: Infrared   SpO2: 98%   Weight: 196 lb (88.9 kg)   Height: 5' 4\" (1.626 m)     Estimated body mass index is 33.64 kg/m² as calculated from the following:    Height as of this encounter: 5' 4\" (1.626 m). Weight as of this encounter: 196 lb (88.9 kg). GEN:  elderly WDWN female patient seated in recliner in NAD. HEAD:  atraumatic, normocephalic. EYES:  EOMI, PERRL, no cataracts, conjunctivae appear normal.  ENT:   Good hearing, EACs without wax, TM's normal, nasal septum midline, no significant congestion, oral cavity without lesions, poor-fair dentition, no dentures. NECK:  fair-good ROM, no palpable masses, no carotid bruits, no JVD. LUNGS:  clear to ausc, no rales, rhonchi or wheezes. HEART:  RRR, no murmurs or gallops. ABD:  soft, non-tender to palp, no palp masses or HSM, normal BS. BACK:  no scoliosis or kyphosis, non-tender to palp. EXTREMITIES: Trace edema, no ulcerations, varicosities or erythema. No gross deformities. MUSCULOSKELETAL: Good ROM of all joints, non tender to palp and or with movement. SKIN:  No ulcerations or breakdown, rash, ecchymosis, or other lesions. NEURO:  No tremor, motor UEs 5/5 LEs  5/5, sensory normal, able to stand and walk slowly using rollator with mild ataxia. PSYCH:  Pleasant and cooperative. Fluid speech, oriented to person, place and time. No delusional statements. Medications reviewed. Labs: 2/15/23 HH 14/42, GFR 57, Na 130, A1c 6.0  7/8/22 Na 129, GFR 47  6/13/22 HH 13/39, Na 127, GFR 47, LFTs nl, TSH 1.4, , HDL 51, A1c 6.4      ASSESSMENT:  Elderly female has HTN (hypertension), benign; Diabetes mellitus type 2, diet-controlled ([facility] [address] DDD (degenerative disc disease), cervical; Non-rheumatic mitral regurgitation; AI (aortic insufficiency); Pacemaker; Hyperlipidemia LDL goal <100; Acquired hypothyroidism; Syncope and collapse;  Hyponatremia; Primary osteoarthritis involving multiple joints; Mild intermittent asthma without complication; History of complete heart block; Depression; Difficulty walking; Chronic fatigue; Overactive bladder; Constipation; Urinary incontinence; Seasonal allergic rhinitis; and Subacute cough on their problem list.     Donnie was seen today for cough. Diagnoses and all orders for this visit:    Subacute cough  -     XR CHEST 1 VIEW; Future    HTN (hypertension), benign  -     XR CHEST 1 VIEW; Future    Pacemaker    Diabetes mellitus type 2, diet-controlled (HCC)    Primary osteoarthritis involving multiple joints    Overactive bladder    Other orders  -     Dextromethorphan-guaiFENesin (MUCINEX DM)  MG TB12; Take 2 tablets by mouth in the morning and at bedtime And 1 tab in afternoon as needed. -     metoprolol succinate (TOPROL XL) 25 MG extended release tablet; TAKE 1 TABLET BY MOUTH AT  BEDTIME. PLAN:    Get CXR. Increase Mucinex DM to 2 tab BID. Encouraged to elevate legs and use compression stockings. Discussed diet and activity level. Continue to use BioFreeze on painful hand joints. Continue Tylenol for pain as needed. Encouraged to drink plenty of fluids. Check BMP & A1c every 3 months, next in April. Continue other meds as per Rx List. Recheck 1 month. 50 minutes spent on visit, 30 minutes involved education/counseling regarding persistent cough, HTN, DM, weight, urinary, and DJD disease processes, treatment options, meds and coordination of care. Current Outpatient Medications   Medication Sig Dispense Refill    Dextromethorphan-guaiFENesin (MUCINEX DM)  MG TB12 Take 2 tablets by mouth in the morning and at bedtime And 1 tab in afternoon as needed. 120 tablet 5    metoprolol succinate (TOPROL XL) 25 MG extended release tablet TAKE 1 TABLET BY MOUTH AT  BEDTIME.  90 tablet 3    spironolactone (ALDACTONE) 25 MG tablet TAKE 1 TABLET BY MOUTH DAILY 30 tablet 11    aspirin (ASPIR-LOW) 81 MG EC tablet TAKE 1 TABLET BY MOUTH ONCE DAILY 30 tablet 11    Calcium-Vitamin D 600-5 MG-MCG TABS Take 1 tablet by mouth daily 30 tablet 11    Omega-3 Fatty Acids (FISH OIL) 1200 MG CAPS TAKE 1 CAPSULE BY MOUTH IN THE MORNING. 30 capsule 11    fluticasone (FLONASE) 50 MCG/ACT nasal spray 1 spray by Each Nostril route daily 16 g 11    folic acid (FOLVITE) 934 MCG tablet TAKE 1 TABLET BY MOUTH IN  THE MORNING. 30 tablet 11    ipratropium (ATROVENT) 0.06 % nasal spray 2 sprays by Each Nostril route 4 times daily as needed for Rhinitis 1 each 5    levothyroxine (SYNTHROID) 100 MCG tablet Take 1 tablet by mouth Daily TAKE 1 TABLET BY MOUTH IN THE MORNING 30 MINUTES BEFORE BREAKFAST OR OTHER MEDICATIONS. 90 tablet 3    escitalopram (LEXAPRO) 5 MG tablet Take 1 tablet by mouth daily 30 tablet 11    loratadine (CLARITIN) 10 MG tablet TAKE 1 TABLET BY MOUTH ONCE A DAY 90 tablet 3    amLODIPine (NORVASC) 5 MG tablet TAKE 1 TABLET BY MOUTH AT  BEDTIME. 30 tablet 11    polyethylene glycol (GLYCOLAX) 17 GM/SCOOP powder Take 17 g by mouth daily as needed (constipation) 510 g 5    simvastatin (ZOCOR) 10 MG tablet TAKE 1 TABLET BY MOUTH AT  BEDTIME. 30 tablet 11    acetaminophen (ACETAMINOPHEN EXTRA STRENGTH) 500 MG tablet TAKE 1 TABLET BY MOUTH FOUR TIMES DAILY AS NEEDED FOR PAIN. 120 tablet 11    vitamin D (CHOLECALCIFEROL) 50 MCG (2000 UT) TABS tablet Take 1 tablet by mouth daily 30 tablet 11    Incontinence Supply Disposable MISC 2 each by Does not apply route as needed (Incontinence) Use prn. Dispense 2 cases. 11 Refills. Size large 2 each 11     No current facility-administered medications for this visit. Return in about 1 month (around 4/16/2023). An electronic signature was used to authenticate this note.     --Sun Stock MD on 3/16/2023 at 12:43 PM

## 2023-03-16 ENCOUNTER — TELEPHONE (OUTPATIENT)
Dept: PRIMARY CARE CLINIC | Age: 88
End: 2023-03-16

## 2023-03-16 ENCOUNTER — OFFICE VISIT (OUTPATIENT)
Dept: PRIMARY CARE CLINIC | Age: 88
End: 2023-03-16

## 2023-03-16 VITALS
BODY MASS INDEX: 33.46 KG/M2 | HEART RATE: 64 BPM | RESPIRATION RATE: 18 BRPM | HEIGHT: 64 IN | DIASTOLIC BLOOD PRESSURE: 78 MMHG | WEIGHT: 196 LBS | OXYGEN SATURATION: 98 % | TEMPERATURE: 97.8 F | SYSTOLIC BLOOD PRESSURE: 134 MMHG

## 2023-03-16 DIAGNOSIS — R05.2 SUBACUTE COUGH: Primary | ICD-10-CM

## 2023-03-16 DIAGNOSIS — I10 HTN (HYPERTENSION), BENIGN: Chronic | ICD-10-CM

## 2023-03-16 DIAGNOSIS — N32.81 OVERACTIVE BLADDER: ICD-10-CM

## 2023-03-16 DIAGNOSIS — Z95.0 PACEMAKER: Chronic | ICD-10-CM

## 2023-03-16 DIAGNOSIS — E11.9 DIABETES MELLITUS TYPE 2, DIET-CONTROLLED (HCC): Chronic | ICD-10-CM

## 2023-03-16 DIAGNOSIS — M15.9 PRIMARY OSTEOARTHRITIS INVOLVING MULTIPLE JOINTS: ICD-10-CM

## 2023-03-16 RX ORDER — GUAIFENESIN AND DEXTROMETHORPHAN HYDROBROMIDE 600; 30 MG/1; MG/1
2 TABLET, EXTENDED RELEASE ORAL 2 TIMES DAILY
Qty: 120 TABLET | Refills: 5 | Status: SHIPPED | OUTPATIENT
Start: 2023-03-16

## 2023-03-16 RX ORDER — METOPROLOL SUCCINATE 25 MG/1
TABLET, EXTENDED RELEASE ORAL
Qty: 90 TABLET | Refills: 3 | Status: SHIPPED | OUTPATIENT
Start: 2023-03-16

## 2023-03-16 NOTE — TELEPHONE ENCOUNTER
Pharmacist [facility] called regarding the Mucinex script. She states that the , 1 tab prn in the afternoon, would put her over the max dose.  Please advise

## 2023-03-23 NOTE — CONSULTS
[facility] [facility] Electrophysiology  Consultation Report  PATIENT: [facility] RECORD NUMBER: [id number]  DATE OF SERVICE:  7/1/2018  ATTENDING ELECTROPHYSIOLOGIST: Rodríguez Jimenez MD  PRIMARY ELECTROPHYSIOLOGIST: Mary Ann Ortega MD  REFERRING PHYSICIAN: No ref. provider found and Jade Patel MD  CHIEF COMPLAINT: Syncope    HPI: This is a 80 y.o. female with a history of   Patient Active Problem List   Diagnosis    HTN (hypertension), benign    Diabetes mellitus type 2, diet-controlled ([address]    AI (aortic insufficiency)    Pacemaker    Hyperlipidemia LDL goal <100    Acquired hypothyroidism    Syncope    Hyponatremia   who presented to the hospital after with syncopal episode. The patient has had history of complete AV block status post pacemaker implantation. She stated that she was doing well and yester day while she was sitting in the chair and having her niece braiding her hair, the patient was asking for the glass of water and while she was taking 1 sip of water, she began to have spastic jerk of the mouth and unresponsive. Her niece was holding her so the patient did not fall. She denied any bowel or bladder incontinence. The change of consciousness lasted for 15 minutes. She denied any warning symptoms including dizziness,, palpitations, chest pain or dyspnea. She denied previous syncope. Remote check of her pacemaker yesterday showed no arrhythmia noted during the episode.     Patient Active Problem List    Diagnosis Date Noted    HTN (hypertension), benign 10/29/2013     Priority: Low    Diabetes mellitus type 2, diet-controlled ([address] 10/29/2013     Priority: Low    Syncope 06/30/2018    Hyponatremia 06/30/2018    Hyperlipidemia LDL goal <100 05/21/2018    Acquired hypothyroidism 05/21/2018    Pacemaker     AI (aortic insufficiency) 02/11/2015       Past Medical History:   Diagnosis Date    Asthma     Atrioventricular (AV) dissociation     AV block, 1st degree 2/11/2015    Diabetes mellitus ([address]     GERD (gastroesophageal reflux disease)     Herniated disc     Cervical    Hyperlipidemia     Hypertension     Mild aortic regurgitation 11/5/13    Mitral regurgitation 11/5/13    mild-moderate    Nontoxic uninodular goiter     OA (osteoarthritis of spine)     cervical spine    Obesity        History reviewed. No pertinent family history.     Social History   Substance Use Topics    Smoking status: Never Smoker    Smokeless tobacco: Never Used    Alcohol use No      Comment: Pop on occ.; rare tea       Current Facility-Administered Medications   Medication Dose Route Frequency Provider Last Rate Last Dose    acetaminophen (TYLENOL) tablet 650 mg  650 mg Oral Q4H PRN Lois Barrios MD        amLODIPine (NORVASC) tablet 5 mg  5 mg Oral QPM Lois Barrios MD   5 mg at 06/30/18 2031    aspirin EC tablet 81 mg  81 mg Oral Daily Lois Barrios MD   81 mg at 10/72/98 6077    folic acid (FOLVITE) tablet 500 mcg  500 mcg Oral Daily Lois Barrios MD   500 mcg at 07/01/18 2184    levothyroxine (SYNTHROID) tablet 100 mcg  100 mcg Oral Daily Lois Barrios MD   100 mcg at 07/01/18 3570    isosorbide mononitrate (IMDUR) extended release tablet 30 mg  30 mg Oral Daily Lois Barrios MD   30 mg at 07/01/18 0910    oxybutynin (DITROPAN) tablet 5 mg  5 mg Oral Nightly Lois Barrios MD   5 mg at 06/30/18 2125    simvastatin (ZOCOR) tablet 10 mg  10 mg Oral Nightly Lois Barrios MD   10 mg at 06/30/18 2125    sodium chloride flush 0.9 % injection 10 mL  10 mL Intravenous 2 times per day Lois Barrios MD   10 mL at 06/30/18 1936    sodium chloride flush 0.9 % injection 10 mL  10 mL Intravenous PRN Lois Barrios MD        enoxaparin (LOVENOX) injection 40 mg  40 mg Subcutaneous Daily Lois Barrios MD   40 mg at 06/30/18 2031    0.9 % sodium chloride infusion   Intravenous Continuous Lois Brarios  I have seen and examined this patient.  I agree with the above assessment and plan.  She is stable at this time  Continue with the above care plan.  Continue with her home Synthroid.     Yolette Connor MD S/P   doing well   cont post op care (TTE)     Demographics      Patient Name       Asael Sorto  Gender               Female                      M      Medical Record     [id number]        Room Number   Number      Account #         [de-identified]       Procedure Date       03/15/2018      Corporate ID                       Ordering Physician   Shilpa Reed MD      Accession Number   [id number]       Referring Physician Shilpa Reed MD      Date of Birth      04/30/1933      Sonographer          Giorgi Berumen [facility]      Age                84 year(s)      Interpreting         Dioni Cabezas MD                                      Physician                                         Any Other     Procedure    Type of Study      TTE procedure:Echo Complete W/Doppler & Color Flow.     Procedure Date  Date: 03/15/2018 Start: 12:11 PM    Study Location: Echo Lab  Technical Quality: Good visualization    Indications:Dyspnea on exertion, Lower extremity edema and Congestive heart  failure.     Patient Status: Routine    Height: 64 inches Weight: 170 pounds BSA: 1.83 m^2 BMI: 29.18 kg/m^2    BP: 135/80 mmHg    Allergies    - ACE inhibitors.      - Penicillins.      - Other drug:(alendronate sodium, flomax, vioxx,).     Findings      Left Ventricle   Left ventricle is normal in size .   Moderate proximal septal thickening/hypertrophy.   Ejection fraction is visually estimated at 65%.   There is doppler evidence of stage I diastolic dysfunction.      Right Ventricle   Normal right ventricular size and function.   Pacer wire visualized in right ventricle.      Left Atrium   Normal sized left atrium.   Interatrial septum appears intact.      Right Atrium   Normal right atrium size.   Pacer wire visualized in right atrium.      Mitral Valve   Moderate mitral annular calcification.   Physiologic and/or trace mitral regurgitation is present.   No THANIA.      Tricuspid Valve   Normal tricuspid valve structure and function.   Physiologic and/or trace tricuspid POD 1 s/p primary , doing well, agree with resident note above. Routine postop care.

## 2023-04-18 DIAGNOSIS — R05.2 SUBACUTE COUGH: ICD-10-CM

## 2023-04-18 DIAGNOSIS — I10 HTN (HYPERTENSION), BENIGN: Chronic | ICD-10-CM

## 2023-04-19 LAB
AVERAGE GLUCOSE: 128
BUN BLDV-MCNC: 42 MG/DL
CALCIUM SERPL-MCNC: 9 MG/DL
CHLORIDE BLD-SCNC: 96 MMOL/L
CO2: 22 MMOL/L
CREAT SERPL-MCNC: 1.4 MG/DL
EGFR: 43
GLUCOSE BLD-MCNC: 72 MG/DL
HBA1C MFR BLD: 6.1 %
POTASSIUM SERPL-SCNC: 4.9 MMOL/L
SODIUM BLD-SCNC: 128 MMOL/L

## 2023-05-10 NOTE — PROGRESS NOTES
5/11/2023    Home visit medically necessary in lieu of an office visit due to: [facility] resident, uses walker, difficult to get out. HPI:  Patient says she is doing okay. She fell on 4/14/23 while going to eye doctor. She was hurting in L collarbone area but not hurting now. She is getting around okay and her breathing is normal. Her cough is much better on Mucinex DM now. Her knees, hands and feet bother her a little. She uses BioFreeze and takes Tylenol which help. Her legs only swell when she doesn't elevate them. She says she is not depressed, on Lexapro. Her overactive bladder bothers her at times. She is not doing her Kegel exercises much. She has urinary incontinence and wears Depends. She is moving her bowels well. She has had no falls in this month. REVIEW OF SYSTEMS:  GENERAL: Appetite good. No weight change, generally healthy, no change in strength or exercise tolerance. CARDIOVASCULAR: No chest pains, no murmurs, no palpitations, no syncope, no orthopnea. LEGS HAVE BEEN SWELLING. RESPIRATORY: No shortness of breath, no pain with breathing, no wheezing, no hemoptysis, no cough. GASTROINTESTINAL: No change in appetite, no dysphagia, no heartburn, no abdominal pains, no diarrhea, no bowel habit changes, no emesis, no melena, no hemorrhoids. CONSTIPATION SOMETIMES. GENITOURINARY: No incontinence or retention, no urinary urgency, no frequent UTIs, no dysuria, no change in nature of urine. STRESS INCONTINENCE AT TIMES. MUSCULOSKELETAL: No swelling or redness of joints, no limitation of range of motion, no weakness or numbness. HAS HAD JT PAINS BUT NOT NOW. NEURO/PSYCH: No weakness, no tremor, no seizures, no changes in mentation, no ataxia. No depressive symptoms, no changes in sleep habits, no changes in thought content. MEMORY PROBLEMS. All other systems negative.     Allergies   Allergen Reactions    Flomax [Tamsulosin Hcl] Other (See Comments)     Causes chest pain    Ace Inhibitors Swelling

## 2023-05-11 ENCOUNTER — OFFICE VISIT (OUTPATIENT)
Dept: PRIMARY CARE CLINIC | Age: 88
End: 2023-05-11
Payer: COMMERCIAL

## 2023-05-11 VITALS
RESPIRATION RATE: 18 BRPM | BODY MASS INDEX: 32.98 KG/M2 | DIASTOLIC BLOOD PRESSURE: 78 MMHG | OXYGEN SATURATION: 97 % | WEIGHT: 193.2 LBS | SYSTOLIC BLOOD PRESSURE: 136 MMHG | HEART RATE: 64 BPM | TEMPERATURE: 97.1 F | HEIGHT: 64 IN

## 2023-05-11 DIAGNOSIS — N32.81 OVERACTIVE BLADDER: ICD-10-CM

## 2023-05-11 DIAGNOSIS — Z95.0 PACEMAKER: Chronic | ICD-10-CM

## 2023-05-11 DIAGNOSIS — I10 HTN (HYPERTENSION), BENIGN: Chronic | ICD-10-CM

## 2023-05-11 DIAGNOSIS — R05.2 SUBACUTE COUGH: ICD-10-CM

## 2023-05-11 DIAGNOSIS — I10 PRIMARY HYPERTENSION: Primary | ICD-10-CM

## 2023-05-11 DIAGNOSIS — N18.32 STAGE 3B CHRONIC KIDNEY DISEASE (HCC): ICD-10-CM

## 2023-05-11 DIAGNOSIS — E87.1 HYPONATREMIA: ICD-10-CM

## 2023-05-11 DIAGNOSIS — M15.9 PRIMARY OSTEOARTHRITIS INVOLVING MULTIPLE JOINTS: ICD-10-CM

## 2023-05-11 DIAGNOSIS — E11.9 DIABETES MELLITUS TYPE 2, DIET-CONTROLLED (HCC): Chronic | ICD-10-CM

## 2023-05-11 PROCEDURE — G8417 CALC BMI ABV UP PARAM F/U: HCPCS | Performed by: FAMILY MEDICINE

## 2023-05-11 PROCEDURE — 1036F TOBACCO NON-USER: CPT | Performed by: FAMILY MEDICINE

## 2023-05-11 PROCEDURE — 1123F ACP DISCUSS/DSCN MKR DOCD: CPT | Performed by: FAMILY MEDICINE

## 2023-05-11 PROCEDURE — 99349 HOME/RES VST EST MOD MDM 40: CPT | Performed by: FAMILY MEDICINE

## 2023-05-11 PROCEDURE — 1090F PRES/ABSN URINE INCON ASSESS: CPT | Performed by: FAMILY MEDICINE

## 2023-06-06 PROBLEM — R05.3 CHRONIC COUGH: Status: ACTIVE | Noted: 2023-03-16

## 2023-06-06 NOTE — PROGRESS NOTES
Stage 3b chronic kidney disease ([facility] [address] on their problem list.     Donnie was seen today for hypertension, cough and joint pain. Diagnoses and all orders for this visit:    Primary hypertension    Diabetes mellitus type 2, [facility] ([facility]    Primary osteoarthritis involving multiple joints    Overactive bladder    Stage 3b chronic kidney disease (HCC)    Chronic cough    Hyponatremia    Constipation, unspecified constipation type      PLAN:    Encouraged to elevate legs and use compression stockings. Continue Mucinex DM 2 tabs BID. Discussed diet and activity level. Continue to use BioFreeze on painful hand joints. Continue Tylenol for pain as needed. Encouraged to drink plenty of fluids. Check BMP & A1c every 3 months, next in July with yearly labs. Continue other meds as per Rx List. Recheck 1 month. 40 minutes spent on visit, 25 minutes involved education/counseling regarding persistent cough, HTN, DM, weight, urinary, and DJD disease processes, treatment options, meds and coordination of care. Current Outpatient Medications   Medication Sig Dispense Refill    Dextromethorphan-guaiFENesin (MUCINEX DM)  MG TB12 Take 2 tablets by mouth in the morning and at bedtime And 1 tab in afternoon as needed. 120 tablet 5    metoprolol succinate (TOPROL XL) 25 MG extended release tablet TAKE 1 TABLET BY MOUTH AT  BEDTIME. 90 tablet 3    spironolactone (ALDACTONE) 25 MG tablet TAKE 1 TABLET BY MOUTH DAILY 30 tablet 11    aspirin (ASPIR-LOW) 81 MG EC tablet TAKE 1 TABLET BY MOUTH ONCE DAILY 30 tablet 11    Calcium-Vitamin D 600-5 MG-MCG TABS Take 1 tablet by mouth daily 30 tablet 11    Omega-3 Fatty Acids (FISH OIL) 1200 MG CAPS TAKE 1 CAPSULE BY MOUTH IN THE MORNING. 30 capsule 11    fluticasone (FLONASE) 50 MCG/ACT nasal spray 1 spray by Each Nostril route daily 16 g 11    folic acid (FOLVITE) 264 MCG tablet TAKE 1 TABLET BY MOUTH IN  THE MORNING.  30 tablet 11    ipratropium (ATROVENT) 0.06 % nasal spray 2

## 2023-06-07 ENCOUNTER — OFFICE VISIT (OUTPATIENT)
Dept: PRIMARY CARE CLINIC | Age: 88
End: 2023-06-07
Payer: COMMERCIAL

## 2023-06-07 VITALS
OXYGEN SATURATION: 96 % | HEART RATE: 66 BPM | SYSTOLIC BLOOD PRESSURE: 128 MMHG | RESPIRATION RATE: 18 BRPM | WEIGHT: 193.2 LBS | DIASTOLIC BLOOD PRESSURE: 66 MMHG | TEMPERATURE: 97.2 F | BODY MASS INDEX: 32.98 KG/M2 | HEIGHT: 64 IN

## 2023-06-07 DIAGNOSIS — N32.81 OVERACTIVE BLADDER: ICD-10-CM

## 2023-06-07 DIAGNOSIS — N18.32 STAGE 3B CHRONIC KIDNEY DISEASE (HCC): ICD-10-CM

## 2023-06-07 DIAGNOSIS — R05.3 CHRONIC COUGH: ICD-10-CM

## 2023-06-07 DIAGNOSIS — E11.9 DIABETES MELLITUS TYPE 2, DIET-CONTROLLED (HCC): Chronic | ICD-10-CM

## 2023-06-07 DIAGNOSIS — M15.9 PRIMARY OSTEOARTHRITIS INVOLVING MULTIPLE JOINTS: ICD-10-CM

## 2023-06-07 DIAGNOSIS — I10 PRIMARY HYPERTENSION: Primary | ICD-10-CM

## 2023-06-07 DIAGNOSIS — K59.00 CONSTIPATION, UNSPECIFIED CONSTIPATION TYPE: ICD-10-CM

## 2023-06-07 DIAGNOSIS — E87.1 HYPONATREMIA: ICD-10-CM

## 2023-06-07 PROCEDURE — 1090F PRES/ABSN URINE INCON ASSESS: CPT | Performed by: FAMILY MEDICINE

## 2023-06-07 PROCEDURE — 99349 HOME/RES VST EST MOD MDM 40: CPT | Performed by: FAMILY MEDICINE

## 2023-06-07 PROCEDURE — 1123F ACP DISCUSS/DSCN MKR DOCD: CPT | Performed by: FAMILY MEDICINE

## 2023-06-07 PROCEDURE — 3044F HG A1C LEVEL LT 7.0%: CPT | Performed by: FAMILY MEDICINE

## 2023-06-07 PROCEDURE — 1036F TOBACCO NON-USER: CPT | Performed by: FAMILY MEDICINE

## 2023-06-07 PROCEDURE — G8417 CALC BMI ABV UP PARAM F/U: HCPCS | Performed by: FAMILY MEDICINE

## 2023-06-23 ENCOUNTER — TELEPHONE (OUTPATIENT)
Dept: PRIMARY CARE CLINIC | Age: 88
End: 2023-06-23

## 2023-06-29 ENCOUNTER — TELEPHONE (OUTPATIENT)
Dept: PRIMARY CARE CLINIC | Age: 88
End: 2023-06-29

## 2023-06-29 DIAGNOSIS — I10 HTN (HYPERTENSION), BENIGN: Chronic | ICD-10-CM

## 2023-06-29 DIAGNOSIS — N39.498 OTHER URINARY INCONTINENCE: ICD-10-CM

## 2023-06-29 DIAGNOSIS — Z76.0 MEDICATION REFILL: ICD-10-CM

## 2023-06-29 DIAGNOSIS — E03.9 ACQUIRED HYPOTHYROIDISM: ICD-10-CM

## 2023-06-29 DIAGNOSIS — J45.20 MILD INTERMITTENT ASTHMA WITHOUT COMPLICATION: ICD-10-CM

## 2023-07-03 RX ORDER — CHOLECALCIFEROL (VITAMIN D3) 50 MCG
2000 TABLET ORAL DAILY
Qty: 30 TABLET | Refills: 11 | Status: SHIPPED | OUTPATIENT
Start: 2023-07-03

## 2023-07-03 RX ORDER — FLUTICASONE PROPIONATE 50 MCG
1 SPRAY, SUSPENSION (ML) NASAL DAILY
Qty: 16 G | Refills: 11 | Status: SHIPPED | OUTPATIENT
Start: 2023-07-03

## 2023-07-03 RX ORDER — METOPROLOL SUCCINATE 25 MG/1
TABLET, EXTENDED RELEASE ORAL
Qty: 90 TABLET | Refills: 3 | Status: SHIPPED | OUTPATIENT
Start: 2023-07-03

## 2023-07-03 RX ORDER — IPRATROPIUM BROMIDE 42 UG/1
2 SPRAY, METERED NASAL 4 TIMES DAILY PRN
Qty: 1 EACH | Refills: 5 | Status: SHIPPED | OUTPATIENT
Start: 2023-07-03

## 2023-07-03 RX ORDER — ESCITALOPRAM OXALATE 5 MG/1
5 TABLET ORAL DAILY
Qty: 30 TABLET | Refills: 11 | Status: SHIPPED | OUTPATIENT
Start: 2023-07-03

## 2023-07-03 RX ORDER — ASPIRIN 81 MG/1
TABLET ORAL
Qty: 30 TABLET | Refills: 11 | Status: SHIPPED | OUTPATIENT
Start: 2023-07-03

## 2023-07-03 RX ORDER — SPIRONOLACTONE 25 MG/1
TABLET ORAL
Qty: 30 TABLET | Refills: 11 | Status: SHIPPED | OUTPATIENT
Start: 2023-07-03

## 2023-07-03 RX ORDER — SIMVASTATIN 10 MG
TABLET ORAL
Qty: 30 TABLET | Refills: 11 | Status: SHIPPED | OUTPATIENT
Start: 2023-07-03

## 2023-07-03 RX ORDER — GUAIFENESIN AND DEXTROMETHORPHAN HYDROBROMIDE 600; 30 MG/1; MG/1
2 TABLET, EXTENDED RELEASE ORAL 2 TIMES DAILY
Qty: 120 TABLET | Refills: 5 | Status: SHIPPED | OUTPATIENT
Start: 2023-07-03

## 2023-07-03 RX ORDER — POLYETHYLENE GLYCOL 3350 17 G/17G
17 POWDER, FOR SOLUTION ORAL DAILY PRN
Qty: 510 G | Refills: 5 | Status: SHIPPED | OUTPATIENT
Start: 2023-07-03

## 2023-07-03 RX ORDER — AMLODIPINE BESYLATE 5 MG/1
TABLET ORAL
Qty: 30 TABLET | Refills: 11 | Status: SHIPPED | OUTPATIENT
Start: 2023-07-03

## 2023-07-03 RX ORDER — LANOLIN ALCOHOL/MO/W.PET/CERES
CREAM (GRAM) TOPICAL
Qty: 30 TABLET | Refills: 11 | Status: SHIPPED | OUTPATIENT
Start: 2023-07-03

## 2023-07-03 RX ORDER — ACETAMINOPHEN 500 MG
TABLET ORAL
Qty: 120 TABLET | Refills: 11 | Status: SHIPPED | OUTPATIENT
Start: 2023-07-03

## 2023-07-03 RX ORDER — LORATADINE 10 MG/1
TABLET ORAL
Qty: 90 TABLET | Refills: 3 | Status: SHIPPED | OUTPATIENT
Start: 2023-07-03

## 2023-07-03 RX ORDER — AMOXICILLIN 500 MG
CAPSULE ORAL
Qty: 30 CAPSULE | Refills: 11 | Status: SHIPPED | OUTPATIENT
Start: 2023-07-03

## 2023-07-03 RX ORDER — LEVOTHYROXINE SODIUM 0.1 MG/1
100 TABLET ORAL DAILY
Qty: 90 TABLET | Refills: 3 | Status: SHIPPED | OUTPATIENT
Start: 2023-07-03

## 2023-07-03 NOTE — TELEPHONE ENCOUNTER
Sent eRx's to [facility]. Please let them know when she is moving in. Thanks.

## 2023-07-10 NOTE — PROGRESS NOTES
7/11/2023    Home visit medically necessary in lieu of an office visit due to: [facility] resident, uses walker, difficult to get out. HPI:  Patient says she has been doing okay. She is moving to [facility] on 7/15/23. Her breathing has been fine. She still has occasional cough but it is better on Mucinex DM. Her knees, hands and feet aren't bother her much. She gets relief with BioFreeze and takes Tylenol which help. Her legs swell if she doesn't elevate them. She says she is not depressed, on Lexapro. She has urinary incontinence and wears Depends. Her overactive bladder bothers her sometimes. She is not doing her Kegel exercises much. She moves her bowels well. She has not had any falls in this month. REVIEW OF SYSTEMS:  GENERAL: Appetite good. No weight change, generally healthy, no change in strength or exercise tolerance. CARDIOVASCULAR: No chest pains, no murmurs, no palpitations, no syncope, no orthopnea. LEGS HAVE BEEN SWELLING. RESPIRATORY: No shortness of breath, no pain with breathing, no wheezing, no hemoptysis, no cough. GASTROINTESTINAL: No change in appetite, no dysphagia, no heartburn, no abdominal pains, no diarrhea, no bowel habit changes, no emesis, no melena, no hemorrhoids. CONSTIPATION SOMETIMES. GENITOURINARY: No incontinence or retention, no urinary urgency, no frequent UTIs, no dysuria, no change in nature of urine. STRESS INCONTINENCE AT TIMES. MUSCULOSKELETAL: No swelling or redness of joints, no limitation of range of motion, no weakness or numbness. HAS HAD JT PAINS BUT NOT NOW. NEURO/PSYCH: No weakness, no tremor, no seizures, no changes in mentation, no ataxia. No depressive symptoms, no changes in sleep habits, no changes in thought content. MEMORY PROBLEMS. All other systems negative.     Allergies   Allergen Reactions    Flomax [Tamsulosin Hcl] Other (See Comments)     Causes chest pain    Ace Inhibitors Swelling     Causes lip swelling    Alendronate Sodium      Other

## 2023-07-11 ENCOUNTER — OFFICE VISIT (OUTPATIENT)
Dept: PRIMARY CARE CLINIC | Age: 88
End: 2023-07-11
Payer: COMMERCIAL

## 2023-07-11 VITALS
BODY MASS INDEX: 32.98 KG/M2 | HEIGHT: 64 IN | TEMPERATURE: 97.6 F | SYSTOLIC BLOOD PRESSURE: 124 MMHG | HEART RATE: 64 BPM | DIASTOLIC BLOOD PRESSURE: 72 MMHG | OXYGEN SATURATION: 94 % | RESPIRATION RATE: 20 BRPM | WEIGHT: 193.2 LBS

## 2023-07-11 DIAGNOSIS — N18.32 STAGE 3B CHRONIC KIDNEY DISEASE (HCC): ICD-10-CM

## 2023-07-11 DIAGNOSIS — E11.9 DIABETES MELLITUS TYPE 2, DIET-CONTROLLED (HCC): Chronic | ICD-10-CM

## 2023-07-11 DIAGNOSIS — M15.9 PRIMARY OSTEOARTHRITIS INVOLVING MULTIPLE JOINTS: ICD-10-CM

## 2023-07-11 DIAGNOSIS — Z95.0 PACEMAKER: Chronic | ICD-10-CM

## 2023-07-11 DIAGNOSIS — I10 PRIMARY HYPERTENSION: Primary | ICD-10-CM

## 2023-07-11 DIAGNOSIS — N32.81 OVERACTIVE BLADDER: ICD-10-CM

## 2023-07-11 PROCEDURE — 99349 HOME/RES VST EST MOD MDM 40: CPT | Performed by: FAMILY MEDICINE

## 2023-07-11 PROCEDURE — 1090F PRES/ABSN URINE INCON ASSESS: CPT | Performed by: FAMILY MEDICINE

## 2023-07-11 PROCEDURE — 3044F HG A1C LEVEL LT 7.0%: CPT | Performed by: FAMILY MEDICINE

## 2023-07-11 PROCEDURE — 1036F TOBACCO NON-USER: CPT | Performed by: FAMILY MEDICINE

## 2023-07-11 PROCEDURE — 1123F ACP DISCUSS/DSCN MKR DOCD: CPT | Performed by: FAMILY MEDICINE

## 2023-07-11 PROCEDURE — G8417 CALC BMI ABV UP PARAM F/U: HCPCS | Performed by: FAMILY MEDICINE

## 2023-08-06 NOTE — PROGRESS NOTES
rhinitis; Chronic cough; and Stage 3b chronic kidney disease ([address]) on their problem list.     Donnie was seen today for hypertension and skin problem. Diagnoses and all orders for this visit:    Primary hypertension  -     CBC with Auto Differential; Future  -     Comprehensive Metabolic Panel; Future  -     TSH; Future    Primary osteoarthritis involving multiple joints    Stage 3b chronic kidney disease (HCC)    Overactive bladder    Difficulty walking    Diabetes mellitus type 2, diet-controlled (HCC)  -     Hemoglobin A1C; Future      PLAN:    Check yearly labs. Continue Mucinex DM 2 tabs BID. Discussed diet and activity level. Encouraged to elevate legs and use compression stockings. Continue to use BioFreeze on painful hand joints. Continue Tylenol for pain as needed. Encouraged to drink plenty of fluids. Check BMP & A1c every 3 months, next in November. Continue other meds as per Rx List. Recheck 1 month. 40 minutes spent on visit, 25 minutes involved education/counseling regarding persistent cough, HTN, DM, weight, urinary, and DJD disease processes, treatment options, meds and coordination of care. Current Outpatient Medications   Medication Sig Dispense Refill    folic acid (FOLVITE) 162 MCG tablet TAKE 1 TABLET BY MOUTH IN  THE MORNING. 30 tablet 11    aspirin (ASPIR-LOW) 81 MG EC tablet TAKE 1 TABLET BY MOUTH ONCE DAILY 30 tablet 11    amLODIPine (NORVASC) 5 MG tablet TAKE 1 TABLET BY MOUTH AT  BEDTIME. 30 tablet 11    loratadine (CLARITIN) 10 MG tablet TAKE 1 TABLET BY MOUTH ONCE A DAY 90 tablet 3    metoprolol succinate (TOPROL XL) 25 MG extended release tablet TAKE 1 TABLET BY MOUTH AT  BEDTIME. 90 tablet 3    Omega-3 Fatty Acids (FISH OIL) 1200 MG CAPS TAKE 1 CAPSULE BY MOUTH IN THE MORNING. 30 capsule 11    simvastatin (ZOCOR) 10 MG tablet TAKE 1 TABLET BY MOUTH AT  BEDTIME.  30 tablet 11    spironolactone (ALDACTONE) 25 MG tablet TAKE 1 TABLET BY MOUTH DAILY 30 tablet 11    fluticasone

## 2023-08-07 ENCOUNTER — OFFICE VISIT (OUTPATIENT)
Dept: PRIMARY CARE CLINIC | Age: 88
End: 2023-08-07
Payer: COMMERCIAL

## 2023-08-07 ENCOUNTER — TELEPHONE (OUTPATIENT)
Dept: PRIMARY CARE CLINIC | Age: 88
End: 2023-08-07

## 2023-08-07 VITALS
HEART RATE: 77 BPM | SYSTOLIC BLOOD PRESSURE: 134 MMHG | BODY MASS INDEX: 32.68 KG/M2 | TEMPERATURE: 97.2 F | HEIGHT: 64 IN | DIASTOLIC BLOOD PRESSURE: 64 MMHG | WEIGHT: 191.4 LBS | OXYGEN SATURATION: 94 % | RESPIRATION RATE: 18 BRPM

## 2023-08-07 DIAGNOSIS — I10 PRIMARY HYPERTENSION: Primary | ICD-10-CM

## 2023-08-07 DIAGNOSIS — N18.32 STAGE 3B CHRONIC KIDNEY DISEASE (HCC): ICD-10-CM

## 2023-08-07 DIAGNOSIS — M15.9 PRIMARY OSTEOARTHRITIS INVOLVING MULTIPLE JOINTS: ICD-10-CM

## 2023-08-07 DIAGNOSIS — R26.2 DIFFICULTY WALKING: ICD-10-CM

## 2023-08-07 DIAGNOSIS — N32.81 OVERACTIVE BLADDER: ICD-10-CM

## 2023-08-07 DIAGNOSIS — E11.9 DIABETES MELLITUS TYPE 2, DIET-CONTROLLED (HCC): Chronic | ICD-10-CM

## 2023-08-07 PROCEDURE — 1036F TOBACCO NON-USER: CPT | Performed by: FAMILY MEDICINE

## 2023-08-07 PROCEDURE — 1123F ACP DISCUSS/DSCN MKR DOCD: CPT | Performed by: FAMILY MEDICINE

## 2023-08-07 PROCEDURE — 3044F HG A1C LEVEL LT 7.0%: CPT | Performed by: FAMILY MEDICINE

## 2023-08-07 PROCEDURE — 1090F PRES/ABSN URINE INCON ASSESS: CPT | Performed by: FAMILY MEDICINE

## 2023-08-07 PROCEDURE — G8417 CALC BMI ABV UP PARAM F/U: HCPCS | Performed by: FAMILY MEDICINE

## 2023-08-07 PROCEDURE — 99349 HOME/RES VST EST MOD MDM 40: CPT | Performed by: FAMILY MEDICINE

## 2023-08-09 DIAGNOSIS — Z76.0 MEDICATION REFILL: ICD-10-CM

## 2023-08-09 LAB
ALBUMIN SERPL-MCNC: 3.1 G/DL (ref 3.5–5.2)
ALP SERPL-CCNC: 107 U/L (ref 35–104)
ALT SERPL-CCNC: 18 U/L (ref 0–32)
ANION GAP SERPL CALCULATED.3IONS-SCNC: 14 MMOL/L (ref 7–16)
AST SERPL-CCNC: 23 U/L (ref 0–31)
BASOPHILS # BLD: 0.1 K/UL (ref 0–0.2)
BASOPHILS NFR BLD: 1 % (ref 0–2)
BILIRUB SERPL-MCNC: 0.5 MG/DL (ref 0–1.2)
BUN SERPL-MCNC: 25 MG/DL (ref 6–23)
CALCIUM SERPL-MCNC: 9.1 MG/DL (ref 8.6–10.2)
CHLORIDE SERPL-SCNC: 97 MMOL/L (ref 98–107)
CO2 SERPL-SCNC: 21 MMOL/L (ref 22–29)
CREAT SERPL-MCNC: 1.1 MG/DL (ref 0.5–1)
EOSINOPHIL # BLD: 0.27 K/UL (ref 0.05–0.5)
EOSINOPHILS RELATIVE PERCENT: 4 % (ref 0–6)
ERYTHROCYTE [DISTWIDTH] IN BLOOD BY AUTOMATED COUNT: 14.3 % (ref 11.5–15)
GFR SERPL CREATININE-BSD FRML MDRD: 49 ML/MIN/1.73M2
GLUCOSE SERPL-MCNC: 80 MG/DL (ref 74–99)
HBA1C MFR BLD: 5.9 % (ref 4–5.6)
HCT VFR BLD AUTO: 38.9 % (ref 34–48)
HGB BLD-MCNC: 12.3 G/DL (ref 11.5–15.5)
IMM GRANULOCYTES # BLD AUTO: <0.03 K/UL (ref 0–0.58)
IMM GRANULOCYTES NFR BLD: 0 % (ref 0–5)
LYMPHOCYTES NFR BLD: 2.71 K/UL (ref 1.5–4)
LYMPHOCYTES RELATIVE PERCENT: 36 % (ref 20–42)
MCH RBC QN AUTO: 28.7 PG (ref 26–35)
MCHC RBC AUTO-ENTMCNC: 31.6 G/DL (ref 32–34.5)
MCV RBC AUTO: 90.7 FL (ref 80–99.9)
MONOCYTES NFR BLD: 1.07 K/UL (ref 0.1–0.95)
MONOCYTES NFR BLD: 14 % (ref 2–12)
NEUTROPHILS NFR BLD: 45 % (ref 43–80)
NEUTS SEG NFR BLD: 3.35 K/UL (ref 1.8–7.3)
PLATELET # BLD AUTO: 339 K/UL (ref 130–450)
PMV BLD AUTO: 9.8 FL (ref 7–12)
POTASSIUM SERPL-SCNC: 4.7 MMOL/L (ref 3.5–5)
PROT SERPL-MCNC: 7.6 G/DL (ref 6.4–8.3)
RBC # BLD AUTO: 4.29 M/UL (ref 3.5–5.5)
SODIUM SERPL-SCNC: 132 MMOL/L (ref 132–146)
TSH SERPL DL<=0.05 MIU/L-ACNC: 6.35 UIU/ML (ref 0.27–4.2)
WBC OTHER # BLD: 7.5 K/UL (ref 4.5–11.5)

## 2023-08-09 RX ORDER — LEVOTHYROXINE SODIUM 112 UG/1
112 TABLET ORAL DAILY
Qty: 30 TABLET | Refills: 5 | Status: SHIPPED | OUTPATIENT
Start: 2023-08-09

## 2023-08-10 LAB — HBA1C MFR BLD: 6 % (ref 4–5.6)

## 2023-09-05 ENCOUNTER — OFFICE VISIT (OUTPATIENT)
Dept: PRIMARY CARE CLINIC | Age: 88
End: 2023-09-05
Payer: COMMERCIAL

## 2023-09-05 VITALS
DIASTOLIC BLOOD PRESSURE: 66 MMHG | WEIGHT: 191.8 LBS | OXYGEN SATURATION: 96 % | BODY MASS INDEX: 32.74 KG/M2 | SYSTOLIC BLOOD PRESSURE: 114 MMHG | HEIGHT: 64 IN | HEART RATE: 80 BPM | RESPIRATION RATE: 18 BRPM | TEMPERATURE: 96 F

## 2023-09-05 VITALS
BODY MASS INDEX: 32.74 KG/M2 | DIASTOLIC BLOOD PRESSURE: 66 MMHG | HEART RATE: 80 BPM | TEMPERATURE: 96 F | SYSTOLIC BLOOD PRESSURE: 114 MMHG | OXYGEN SATURATION: 96 % | HEIGHT: 64 IN | WEIGHT: 191.8 LBS | RESPIRATION RATE: 18 BRPM

## 2023-09-05 DIAGNOSIS — J30.2 SEASONAL ALLERGIC RHINITIS, UNSPECIFIED TRIGGER: ICD-10-CM

## 2023-09-05 DIAGNOSIS — J45.20 MILD INTERMITTENT ASTHMA WITHOUT COMPLICATION: ICD-10-CM

## 2023-09-05 DIAGNOSIS — E11.9 DIABETES MELLITUS TYPE 2, DIET-CONTROLLED (HCC): Chronic | ICD-10-CM

## 2023-09-05 DIAGNOSIS — M15.9 PRIMARY OSTEOARTHRITIS INVOLVING MULTIPLE JOINTS: ICD-10-CM

## 2023-09-05 DIAGNOSIS — I35.1 NONRHEUMATIC AORTIC VALVE INSUFFICIENCY: ICD-10-CM

## 2023-09-05 DIAGNOSIS — R26.2 DIFFICULTY WALKING: ICD-10-CM

## 2023-09-05 DIAGNOSIS — I34.0 NON-RHEUMATIC MITRAL REGURGITATION: ICD-10-CM

## 2023-09-05 DIAGNOSIS — R05.3 CHRONIC COUGH: ICD-10-CM

## 2023-09-05 DIAGNOSIS — R53.82 CHRONIC FATIGUE: ICD-10-CM

## 2023-09-05 DIAGNOSIS — N18.32 STAGE 3B CHRONIC KIDNEY DISEASE (HCC): ICD-10-CM

## 2023-09-05 DIAGNOSIS — I10 PRIMARY HYPERTENSION: Primary | ICD-10-CM

## 2023-09-05 DIAGNOSIS — Z86.79 HISTORY OF COMPLETE HEART BLOCK: ICD-10-CM

## 2023-09-05 DIAGNOSIS — N32.81 OVERACTIVE BLADDER: ICD-10-CM

## 2023-09-05 DIAGNOSIS — F32.89 OTHER DEPRESSION: ICD-10-CM

## 2023-09-05 DIAGNOSIS — N39.45 CONTINUOUS LEAKAGE OF URINE: ICD-10-CM

## 2023-09-05 DIAGNOSIS — K59.00 CONSTIPATION, UNSPECIFIED CONSTIPATION TYPE: ICD-10-CM

## 2023-09-05 DIAGNOSIS — M50.30 DDD (DEGENERATIVE DISC DISEASE), CERVICAL: ICD-10-CM

## 2023-09-05 DIAGNOSIS — E78.5 HYPERLIPIDEMIA LDL GOAL <100: Chronic | ICD-10-CM

## 2023-09-05 DIAGNOSIS — R55 SYNCOPE AND COLLAPSE: ICD-10-CM

## 2023-09-05 DIAGNOSIS — Z95.0 PACEMAKER: Chronic | ICD-10-CM

## 2023-09-05 DIAGNOSIS — Z00.00 MEDICARE ANNUAL WELLNESS VISIT, SUBSEQUENT: ICD-10-CM

## 2023-09-05 DIAGNOSIS — E03.9 ACQUIRED HYPOTHYROIDISM: Chronic | ICD-10-CM

## 2023-09-05 DIAGNOSIS — E87.1 HYPONATREMIA: ICD-10-CM

## 2023-09-05 PROCEDURE — 1090F PRES/ABSN URINE INCON ASSESS: CPT | Performed by: FAMILY MEDICINE

## 2023-09-05 PROCEDURE — 1036F TOBACCO NON-USER: CPT | Performed by: FAMILY MEDICINE

## 2023-09-05 PROCEDURE — 1123F ACP DISCUSS/DSCN MKR DOCD: CPT | Performed by: FAMILY MEDICINE

## 2023-09-05 PROCEDURE — 99349 HOME/RES VST EST MOD MDM 40: CPT | Performed by: FAMILY MEDICINE

## 2023-09-05 PROCEDURE — G8417 CALC BMI ABV UP PARAM F/U: HCPCS | Performed by: FAMILY MEDICINE

## 2023-09-05 PROCEDURE — G0439 PPPS, SUBSEQ VISIT: HCPCS | Performed by: FAMILY MEDICINE

## 2023-09-05 PROCEDURE — 3044F HG A1C LEVEL LT 7.0%: CPT | Performed by: FAMILY MEDICINE

## 2023-09-05 RX ORDER — IPRATROPIUM BROMIDE 42 UG/1
2 SPRAY, METERED NASAL 4 TIMES DAILY PRN
Qty: 1 EACH | Refills: 5 | Status: SHIPPED | OUTPATIENT
Start: 2023-09-05

## 2023-09-05 ASSESSMENT — PATIENT HEALTH QUESTIONNAIRE - PHQ9
4. FEELING TIRED OR HAVING LITTLE ENERGY: 0
2. FEELING DOWN, DEPRESSED OR HOPELESS: 1
10. IF YOU CHECKED OFF ANY PROBLEMS, HOW DIFFICULT HAVE THESE PROBLEMS MADE IT FOR YOU TO DO YOUR WORK, TAKE CARE OF THINGS AT HOME, OR GET ALONG WITH OTHER PEOPLE: 0
3. TROUBLE FALLING OR STAYING ASLEEP: 1
SUM OF ALL RESPONSES TO PHQ9 QUESTIONS 1 & 2: 2
8. MOVING OR SPEAKING SO SLOWLY THAT OTHER PEOPLE COULD HAVE NOTICED. OR THE OPPOSITE, BEING SO FIGETY OR RESTLESS THAT YOU HAVE BEEN MOVING AROUND A LOT MORE THAN USUAL: 0
1. LITTLE INTEREST OR PLEASURE IN DOING THINGS: 1
SUM OF ALL RESPONSES TO PHQ QUESTIONS 1-9: 3
SUM OF ALL RESPONSES TO PHQ QUESTIONS 1-9: 3
9. THOUGHTS THAT YOU WOULD BE BETTER OFF DEAD, OR OF HURTING YOURSELF: 0
SUM OF ALL RESPONSES TO PHQ QUESTIONS 1-9: 3
SUM OF ALL RESPONSES TO PHQ QUESTIONS 1-9: 3
6. FEELING BAD ABOUT YOURSELF - OR THAT YOU ARE A FAILURE OR HAVE LET YOURSELF OR YOUR FAMILY DOWN: 0
7. TROUBLE CONCENTRATING ON THINGS, SUCH AS READING THE NEWSPAPER OR WATCHING TELEVISION: 0
5. POOR APPETITE OR OVEREATING: 0

## 2023-09-05 ASSESSMENT — LIFESTYLE VARIABLES
HOW OFTEN DO YOU HAVE A DRINK CONTAINING ALCOHOL: NEVER
HOW MANY STANDARD DRINKS CONTAINING ALCOHOL DO YOU HAVE ON A TYPICAL DAY: PATIENT DOES NOT DRINK

## 2023-09-05 NOTE — PROGRESS NOTES
IN THE MORNING. 30 capsule 11    simvastatin (ZOCOR) 10 MG tablet TAKE 1 TABLET BY MOUTH AT  BEDTIME. 30 tablet 11    spironolactone (ALDACTONE) 25 MG tablet TAKE 1 TABLET BY MOUTH DAILY 30 tablet 11    fluticasone (FLONASE) 50 MCG/ACT nasal spray 1 spray by Each Nostril route daily 16 g 11    Calcium-Vitamin D 600-5 MG-MCG TABS Take 1 tablet by mouth daily 30 tablet 11    polyethylene glycol (GLYCOLAX) 17 GM/SCOOP powder Take 17 g by mouth daily as needed (constipation) 510 g 5    Incontinence Supply Disposable MISC 2 each by Does not apply route as needed (Incontinence) Use prn. Dispense 2 cases. 11 Refills. Size large 2 each 11    Dextromethorphan-guaiFENesin (MUCINEX DM)  MG TB12 Take 2 tablets by mouth in the morning and at bedtime And 1 tab in afternoon as needed. 120 tablet 5    acetaminophen (ACETAMINOPHEN EXTRA STRENGTH) 500 MG tablet TAKE 1 TABLET BY MOUTH FOUR TIMES DAILY AS NEEDED FOR PAIN. 120 tablet 11    vitamin D (CHOLECALCIFEROL) 50 MCG (2000 UT) TABS tablet Take 1 tablet by mouth daily 30 tablet 11    escitalopram (LEXAPRO) 5 MG tablet Take 1 tablet by mouth daily 30 tablet 11     No current facility-administered medications for this visit. Return in about 1 month (around 10/5/2023). An electronic signature was used to authenticate this note.     --Clovis Cuadra MD on 9/5/2023 at 10:29 AM

## 2023-09-05 NOTE — PROGRESS NOTES
Medicare Annual Wellness Visit    Petr Bull is here for Medicare AWV    Assessment & Plan   Primary hypertension  Pacemaker  Nonrheumatic aortic valve insufficiency  Non-rheumatic mitral regurgitation  Mild intermittent asthma without complication  Seasonal allergic rhinitis, unspecified trigger  Acquired hypothyroidism  Diabetes mellitus type 2, diet-controlled ([address])  DDD (degenerative disc disease), cervical  Primary osteoarthritis involving multiple joints  Stage 3b chronic kidney disease (HCC)  Overactive bladder  Chronic cough  Hyperlipidemia LDL goal <100  Syncope and collapse  Hyponatremia  History of complete heart block  Other depression  Difficulty walking  Chronic fatigue  Constipation, unspecified constipation type  Continuous leakage of urine  Medicare annual wellness visit, subsequent    Recommendations for Preventive Services Due: see orders and patient instructions/AVS.  Recommended screening schedule for the next 5-10 years is provided to the patient in written form: see Patient Instructions/AVS.     Return for Medicare Annual Wellness Visit in 1 year. Subjective     Patient's complete Health Risk Assessment and screening values have been reviewed and are found in Flowsheets. The following problems were reviewed today and where indicated follow up appointments were made and/or referrals ordered. Positive Risk Factor Screenings with Interventions:    Fall Risk:  Do you feel unsteady or are you worried about falling? : (!) yes  2 or more falls in past year?: no  Fall with injury in past year?: no     Interventions:    Patient lives at [address] [place] [address] and uses wheeled walker at all times. She has not had any falls.                Weight and Activity:  Physical Activity: Insufficiently Active    Days of Exercise per Week: 5 days    Minutes of Exercise per Session: 10 min     On average, how many days per week do you engage in moderate to strenuous exercise (like a brisk walk)?: 5 days  Have you lost

## 2023-10-01 NOTE — PROGRESS NOTES
10/2/2023    Home visit medically necessary in lieu of an office visit due to: [facility] resident, uses walker, difficult to get out. HPI:  Patient says she is doing great this month. Her nose is much better and not running since she is using nose spray. Her breathing has been good and she has occasional cough on Mucinex DM. Her knees, hands and feet aren't bothering her much. She gets relief with BioFreeze and takes Tylenol which help. Her legs swell if she doesn't elevate them. She is not depressed on Lexapro. She is moving her bowels well. She has urinary incontinence and wears Depends. Her overactive bladder bothers her sometimes. She has not had any falls in this month. REVIEW OF SYSTEMS:  GENERAL: Appetite good. No weight change, generally healthy, no change in strength or exercise tolerance. CARDIOVASCULAR: No chest pains, no murmurs, no palpitations, no syncope, no orthopnea. LEGS HAVE BEEN SWELLING. RESPIRATORY: No shortness of breath, no pain with breathing, no wheezing, no hemoptysis, no cough. GASTROINTESTINAL: No change in appetite, no dysphagia, no heartburn, no abdominal pains, no diarrhea, no bowel habit changes, no emesis, no melena, no hemorrhoids. CONSTIPATION SOMETIMES. GENITOURINARY: No incontinence or retention, no urinary urgency, no frequent UTIs, no dysuria, no change in nature of urine. STRESS INCONTINENCE AT TIMES. MUSCULOSKELETAL: No swelling or redness of joints, no limitation of range of motion, no weakness or numbness. HAS HAD JT PAINS BUT NOT NOW. NEURO/PSYCH: No weakness, no tremor, no seizures, no changes in mentation, no ataxia. No depressive symptoms, no changes in sleep habits, no changes in thought content. MEMORY PROBLEMS. All other systems negative.     Allergies   Allergen Reactions    Flomax [Tamsulosin Hcl] Other (See Comments)     Causes chest pain    Ace Inhibitors Swelling     Causes lip swelling    Alendronate Sodium      Other reaction(s): Unknown    Flomax

## 2023-10-02 ENCOUNTER — OFFICE VISIT (OUTPATIENT)
Dept: PRIMARY CARE CLINIC | Age: 88
End: 2023-10-02
Payer: COMMERCIAL

## 2023-10-02 VITALS
HEIGHT: 64 IN | BODY MASS INDEX: 32.68 KG/M2 | OXYGEN SATURATION: 97 % | SYSTOLIC BLOOD PRESSURE: 120 MMHG | TEMPERATURE: 96.3 F | HEART RATE: 70 BPM | DIASTOLIC BLOOD PRESSURE: 62 MMHG | WEIGHT: 191.4 LBS | RESPIRATION RATE: 18 BRPM

## 2023-10-02 DIAGNOSIS — M15.9 PRIMARY OSTEOARTHRITIS INVOLVING MULTIPLE JOINTS: ICD-10-CM

## 2023-10-02 DIAGNOSIS — K59.00 CONSTIPATION, UNSPECIFIED CONSTIPATION TYPE: ICD-10-CM

## 2023-10-02 DIAGNOSIS — I10 PRIMARY HYPERTENSION: Primary | ICD-10-CM

## 2023-10-02 DIAGNOSIS — N18.32 STAGE 3B CHRONIC KIDNEY DISEASE (HCC): ICD-10-CM

## 2023-10-02 DIAGNOSIS — N32.81 OVERACTIVE BLADDER: ICD-10-CM

## 2023-10-02 DIAGNOSIS — Z95.0 PACEMAKER: Chronic | ICD-10-CM

## 2023-10-02 PROCEDURE — G8417 CALC BMI ABV UP PARAM F/U: HCPCS | Performed by: FAMILY MEDICINE

## 2023-10-02 PROCEDURE — 1123F ACP DISCUSS/DSCN MKR DOCD: CPT | Performed by: FAMILY MEDICINE

## 2023-10-02 PROCEDURE — 99349 HOME/RES VST EST MOD MDM 40: CPT | Performed by: FAMILY MEDICINE

## 2023-10-02 PROCEDURE — 1036F TOBACCO NON-USER: CPT | Performed by: FAMILY MEDICINE

## 2023-10-02 PROCEDURE — G8484 FLU IMMUNIZE NO ADMIN: HCPCS | Performed by: FAMILY MEDICINE

## 2023-10-02 PROCEDURE — 1090F PRES/ABSN URINE INCON ASSESS: CPT | Performed by: FAMILY MEDICINE

## 2023-10-02 RX ORDER — GUAIFENESIN AND DEXTROMETHORPHAN HYDROBROMIDE 600; 30 MG/1; MG/1
1 TABLET, EXTENDED RELEASE ORAL 2 TIMES DAILY PRN
Qty: 120 TABLET | Refills: 5
Start: 2023-10-02

## 2023-10-26 DIAGNOSIS — Z23 NEED FOR INFLUENZA VACCINATION: Primary | ICD-10-CM

## 2023-10-26 PROCEDURE — 90694 VACC AIIV4 NO PRSRV 0.5ML IM: CPT | Performed by: PHYSICIAN ASSISTANT

## 2023-10-26 PROCEDURE — G0008 ADMIN INFLUENZA VIRUS VAC: HCPCS | Performed by: PHYSICIAN ASSISTANT

## 2023-10-29 NOTE — PROGRESS NOTES
10/30/2023    Home visit medically necessary in lieu of an office visit due to: [facility] resident, uses walker, difficult to get out. HPI:  Patient says she is doing good this month. Her knees, hands and feet are not bothering her much using BioFreeze and she also takes Tylenol which help. Her nose is much better and not running since she is using nose spray. Her breathing has been good and she is not coughing anymore on Mucinex DM. Her legs swell only if she doesn't elevate them. She is not depressed on Lexapro. She is moving her bowels well. She has urinary incontinence and wears Depends. Her overactive bladder bothers her sometimes. She has not had any recent falls in this month. REVIEW OF SYSTEMS:  GENERAL: Appetite good. No weight change, generally healthy, no change in strength or exercise tolerance. CARDIOVASCULAR: No chest pains, no murmurs, no palpitations, no syncope, no orthopnea. LEGS HAVE BEEN SWELLING. RESPIRATORY: No shortness of breath, no pain with breathing, no wheezing, no hemoptysis, no cough. GASTROINTESTINAL: No change in appetite, no dysphagia, no heartburn, no abdominal pains, no diarrhea, no bowel habit changes, no emesis, no melena, no hemorrhoids. CONSTIPATION SOMETIMES. GENITOURINARY: No incontinence or retention, no urinary urgency, no frequent UTIs, no dysuria, no change in nature of urine. STRESS INCONTINENCE AT TIMES. MUSCULOSKELETAL: No swelling or redness of joints, no limitation of range of motion, no weakness or numbness. HAS HAD JT PAINS BUT NOT NOW. NEURO/PSYCH: No weakness, no tremor, no seizures, no changes in mentation, no ataxia. No depressive symptoms, no changes in sleep habits, no changes in thought content. MEMORY PROBLEMS. All other systems negative.     Allergies   Allergen Reactions    Flomax [Tamsulosin Hcl] Other (See Comments)     Causes chest pain    Ace Inhibitors Swelling     Causes lip swelling    Alendronate Sodium      Other reaction(s): Unknown

## 2023-10-30 ENCOUNTER — OFFICE VISIT (OUTPATIENT)
Dept: PRIMARY CARE CLINIC | Age: 88
End: 2023-10-30
Payer: COMMERCIAL

## 2023-10-30 VITALS
RESPIRATION RATE: 18 BRPM | OXYGEN SATURATION: 97 % | SYSTOLIC BLOOD PRESSURE: 128 MMHG | HEIGHT: 64 IN | HEART RATE: 78 BPM | BODY MASS INDEX: 32.68 KG/M2 | DIASTOLIC BLOOD PRESSURE: 74 MMHG | WEIGHT: 191.4 LBS | TEMPERATURE: 97.5 F

## 2023-10-30 DIAGNOSIS — N18.32 STAGE 3B CHRONIC KIDNEY DISEASE (HCC): ICD-10-CM

## 2023-10-30 DIAGNOSIS — I10 PRIMARY HYPERTENSION: Primary | ICD-10-CM

## 2023-10-30 DIAGNOSIS — K59.00 CONSTIPATION, UNSPECIFIED CONSTIPATION TYPE: ICD-10-CM

## 2023-10-30 DIAGNOSIS — M15.9 PRIMARY OSTEOARTHRITIS INVOLVING MULTIPLE JOINTS: ICD-10-CM

## 2023-10-30 DIAGNOSIS — N32.81 OVERACTIVE BLADDER: ICD-10-CM

## 2023-10-30 DIAGNOSIS — Z95.0 PACEMAKER: Chronic | ICD-10-CM

## 2023-10-30 DIAGNOSIS — R26.2 DIFFICULTY WALKING: ICD-10-CM

## 2023-10-30 PROCEDURE — 1036F TOBACCO NON-USER: CPT | Performed by: FAMILY MEDICINE

## 2023-10-30 PROCEDURE — G8417 CALC BMI ABV UP PARAM F/U: HCPCS | Performed by: FAMILY MEDICINE

## 2023-10-30 PROCEDURE — G8484 FLU IMMUNIZE NO ADMIN: HCPCS | Performed by: FAMILY MEDICINE

## 2023-10-30 PROCEDURE — 99349 HOME/RES VST EST MOD MDM 40: CPT | Performed by: FAMILY MEDICINE

## 2023-10-30 PROCEDURE — 1123F ACP DISCUSS/DSCN MKR DOCD: CPT | Performed by: FAMILY MEDICINE

## 2023-10-30 PROCEDURE — 1090F PRES/ABSN URINE INCON ASSESS: CPT | Performed by: FAMILY MEDICINE

## 2023-11-28 NOTE — PROGRESS NOTES
11/29/2023    Home visit medically necessary in lieu of an office visit due to: [facility] resident, uses walker, difficult to get out. HPI:  Patient says she is doing pretty good this month. Her knees, hands and feet are not bothering her much using BioFreeze and she also takes Tylenol which help. Her nose is much better and not running using nose spray. Her breathing has been good and she is not coughing much on Mucinex DM. Her legs swell only if she doesn't elevate them. She is not depressed on Lexapro. She is moving her bowels well. She has urinary incontinence and wears Depends. Her overactive bladder bothers her sometimes. She has not had any recent falls. REVIEW OF SYSTEMS:  GENERAL: Appetite good. No weight change, generally healthy, no change in strength or exercise tolerance. CARDIOVASCULAR: No chest pains, no murmurs, no palpitations, no syncope, no orthopnea. LEGS HAVE BEEN SWELLING. RESPIRATORY: No shortness of breath, no pain with breathing, no wheezing, no hemoptysis, no cough. GASTROINTESTINAL: No change in appetite, no dysphagia, no heartburn, no abdominal pains, no diarrhea, no bowel habit changes, no emesis, no melena, no hemorrhoids. CONSTIPATION SOMETIMES. GENITOURINARY: No incontinence or retention, no urinary urgency, no frequent UTIs, no dysuria, no change in nature of urine. STRESS INCONTINENCE AT TIMES. MUSCULOSKELETAL: No swelling or redness of joints, no limitation of range of motion, no weakness or numbness. HAS HAD JT PAINS BUT NOT NOW. NEURO/PSYCH: No weakness, no tremor, no seizures, no changes in mentation, no ataxia. No depressive symptoms, no changes in sleep habits, no changes in thought content. MEMORY PROBLEMS. All other systems negative.     Allergies   Allergen Reactions    Flomax [Tamsulosin Hcl] Other (See Comments)     Causes chest pain    Ace Inhibitors Swelling     Causes lip swelling    Alendronate Sodium      Other reaction(s): Unknown    Flomax [Tamsulosin]

## 2023-11-29 ENCOUNTER — OFFICE VISIT (OUTPATIENT)
Dept: PRIMARY CARE CLINIC | Age: 88
End: 2023-11-29
Payer: COMMERCIAL

## 2023-11-29 VITALS
BODY MASS INDEX: 32.94 KG/M2 | SYSTOLIC BLOOD PRESSURE: 126 MMHG | HEART RATE: 60 BPM | RESPIRATION RATE: 18 BRPM | DIASTOLIC BLOOD PRESSURE: 63 MMHG | OXYGEN SATURATION: 92 % | WEIGHT: 191.9 LBS | TEMPERATURE: 97.3 F

## 2023-11-29 DIAGNOSIS — N18.32 STAGE 3B CHRONIC KIDNEY DISEASE (HCC): ICD-10-CM

## 2023-11-29 DIAGNOSIS — Z95.0 PACEMAKER: Chronic | ICD-10-CM

## 2023-11-29 DIAGNOSIS — I10 PRIMARY HYPERTENSION: Primary | ICD-10-CM

## 2023-11-29 DIAGNOSIS — M15.9 PRIMARY OSTEOARTHRITIS INVOLVING MULTIPLE JOINTS: ICD-10-CM

## 2023-11-29 DIAGNOSIS — E11.9 DIABETES MELLITUS TYPE 2, DIET-CONTROLLED (HCC): Chronic | ICD-10-CM

## 2023-11-29 DIAGNOSIS — N32.81 OVERACTIVE BLADDER: ICD-10-CM

## 2023-11-29 PROCEDURE — G8417 CALC BMI ABV UP PARAM F/U: HCPCS | Performed by: FAMILY MEDICINE

## 2023-11-29 PROCEDURE — G8484 FLU IMMUNIZE NO ADMIN: HCPCS | Performed by: FAMILY MEDICINE

## 2023-11-29 PROCEDURE — 1123F ACP DISCUSS/DSCN MKR DOCD: CPT | Performed by: FAMILY MEDICINE

## 2023-11-29 PROCEDURE — 1090F PRES/ABSN URINE INCON ASSESS: CPT | Performed by: FAMILY MEDICINE

## 2023-11-29 PROCEDURE — 1036F TOBACCO NON-USER: CPT | Performed by: FAMILY MEDICINE

## 2023-11-29 PROCEDURE — 3044F HG A1C LEVEL LT 7.0%: CPT | Performed by: FAMILY MEDICINE

## 2023-11-29 PROCEDURE — 99349 HOME/RES VST EST MOD MDM 40: CPT | Performed by: FAMILY MEDICINE

## 2023-11-30 LAB
ANION GAP SERPL CALCULATED.3IONS-SCNC: 13 MMOL/L (ref 7–16)
BUN SERPL-MCNC: 23 MG/DL (ref 6–23)
CALCIUM SERPL-MCNC: 8.7 MG/DL (ref 8.6–10.2)
CHLORIDE SERPL-SCNC: 99 MMOL/L (ref 98–107)
CO2 SERPL-SCNC: 21 MMOL/L (ref 22–29)
CREAT SERPL-MCNC: 1.1 MG/DL (ref 0.5–1)
GFR SERPL CREATININE-BSD FRML MDRD: 50 ML/MIN/1.73M2
GLUCOSE SERPL-MCNC: 79 MG/DL (ref 74–99)
HBA1C MFR BLD: 6.1 % (ref 4–5.6)
POTASSIUM SERPL-SCNC: 4.2 MMOL/L (ref 3.5–5)
SODIUM SERPL-SCNC: 133 MMOL/L (ref 132–146)

## 2024-01-04 NOTE — PROGRESS NOTES
1/5/2024    Home visit medically necessary in lieu of an office visit due to:  [facility] resident, uses walker, difficult to get out.    HPI:  Patient says she is doing good this month. Her knees, hands and feet have not been bothering her much using BioFreeze. She takes Tylenol also which help. Her nose is not running using nose spray. Her breathing has been good and she is not coughing very often on Mucinex DM. Her legs swell only if she doesn't elevate them. She is not depressed on Lexapro. She is moving her bowels well. She has urinary incontinence and wears Depends. Her overactive bladder bothers her sometimes. She has not had any recent falls.     REVIEW OF SYSTEMS:  GENERAL: Appetite good.  No weight change, generally healthy, no change in strength or exercise tolerance. CARDIOVASCULAR: No chest pains, no murmurs, no palpitations, no syncope, no orthopnea.  LEGS HAVE BEEN SWELLING.RESPIRATORY: No shortness of breath, no pain with breathing, no wheezing, no hemoptysis, no cough. GASTROINTESTINAL: No change in appetite, no dysphagia, no heartburn, no abdominal pains, no diarrhea, no bowel habit changes, no emesis, no melena, no hemorrhoids. CONSTIPATION SOMETIMES. GENITOURINARY: No incontinence or retention, no urinary urgency, no frequent UTIs, no dysuria, no change in nature of urine. STRESS INCONTINENCE AT TIMES. MUSCULOSKELETAL: No swelling or redness of joints, no limitation of range of motion, no weakness or numbness.  HAS HAD JT PAINS BUT NOT NOW. NEURO/PSYCH: No weakness, no tremor, no seizures, no changes in mentation, no ataxia. No depressive symptoms, no changes in sleep habits, no changes in thought content.  MEMORY PROBLEMS. All other systems negative.    Allergies   Allergen Reactions    Flomax [Tamsulosin Hcl] Other (See Comments)     Causes chest pain    Ace Inhibitors Swelling     Causes lip swelling    Alendronate Sodium      Other reaction(s): Unknown    Flomax [Tamsulosin]     Alendronate Sodium

## 2024-01-05 ENCOUNTER — OFFICE VISIT (OUTPATIENT)
Dept: PRIMARY CARE CLINIC | Age: 89
End: 2024-01-05
Payer: COMMERCIAL

## 2024-01-05 VITALS
RESPIRATION RATE: 18 BRPM | TEMPERATURE: 97.6 F | HEIGHT: 64 IN | OXYGEN SATURATION: 94 % | BODY MASS INDEX: 32.74 KG/M2 | SYSTOLIC BLOOD PRESSURE: 131 MMHG | DIASTOLIC BLOOD PRESSURE: 74 MMHG | WEIGHT: 191.8 LBS | HEART RATE: 73 BPM

## 2024-01-05 DIAGNOSIS — I10 PRIMARY HYPERTENSION: Primary | ICD-10-CM

## 2024-01-05 DIAGNOSIS — R26.2 DIFFICULTY WALKING: ICD-10-CM

## 2024-01-05 DIAGNOSIS — M15.9 PRIMARY OSTEOARTHRITIS INVOLVING MULTIPLE JOINTS: ICD-10-CM

## 2024-01-05 DIAGNOSIS — N18.32 STAGE 3B CHRONIC KIDNEY DISEASE (HCC): ICD-10-CM

## 2024-01-05 PROCEDURE — 99349 HOME/RES VST EST MOD MDM 40: CPT | Performed by: FAMILY MEDICINE

## 2024-01-05 PROCEDURE — G8417 CALC BMI ABV UP PARAM F/U: HCPCS | Performed by: FAMILY MEDICINE

## 2024-01-05 PROCEDURE — 1036F TOBACCO NON-USER: CPT | Performed by: FAMILY MEDICINE

## 2024-01-05 PROCEDURE — G8484 FLU IMMUNIZE NO ADMIN: HCPCS | Performed by: FAMILY MEDICINE

## 2024-01-05 PROCEDURE — 1090F PRES/ABSN URINE INCON ASSESS: CPT | Performed by: FAMILY MEDICINE

## 2024-01-05 PROCEDURE — 1123F ACP DISCUSS/DSCN MKR DOCD: CPT | Performed by: FAMILY MEDICINE

## 2024-01-05 ASSESSMENT — PATIENT HEALTH QUESTIONNAIRE - PHQ9
6. FEELING BAD ABOUT YOURSELF - OR THAT YOU ARE A FAILURE OR HAVE LET YOURSELF OR YOUR FAMILY DOWN: 0
5. POOR APPETITE OR OVEREATING: 0
8. MOVING OR SPEAKING SO SLOWLY THAT OTHER PEOPLE COULD HAVE NOTICED. OR THE OPPOSITE, BEING SO FIGETY OR RESTLESS THAT YOU HAVE BEEN MOVING AROUND A LOT MORE THAN USUAL: 0
7. TROUBLE CONCENTRATING ON THINGS, SUCH AS READING THE NEWSPAPER OR WATCHING TELEVISION: 0
SUM OF ALL RESPONSES TO PHQ QUESTIONS 1-9: 1
10. IF YOU CHECKED OFF ANY PROBLEMS, HOW DIFFICULT HAVE THESE PROBLEMS MADE IT FOR YOU TO DO YOUR WORK, TAKE CARE OF THINGS AT HOME, OR GET ALONG WITH OTHER PEOPLE: 0
SUM OF ALL RESPONSES TO PHQ QUESTIONS 1-9: 1
SUM OF ALL RESPONSES TO PHQ QUESTIONS 1-9: 1
1. LITTLE INTEREST OR PLEASURE IN DOING THINGS: 0
4. FEELING TIRED OR HAVING LITTLE ENERGY: 1
SUM OF ALL RESPONSES TO PHQ QUESTIONS 1-9: 1
9. THOUGHTS THAT YOU WOULD BE BETTER OFF DEAD, OR OF HURTING YOURSELF: 0
2. FEELING DOWN, DEPRESSED OR HOPELESS: 0
3. TROUBLE FALLING OR STAYING ASLEEP: 0
SUM OF ALL RESPONSES TO PHQ9 QUESTIONS 1 & 2: 0

## 2024-01-18 RX ORDER — LEVOTHYROXINE SODIUM 112 UG/1
112 TABLET ORAL DAILY
Qty: 31 TABLET | Refills: 10 | Status: SHIPPED | OUTPATIENT
Start: 2024-01-18

## 2024-01-18 RX ORDER — LEVOTHYROXINE SODIUM 112 UG/1
112 TABLET ORAL DAILY
Qty: 31 TABLET | Refills: 11 | Status: SHIPPED | OUTPATIENT
Start: 2024-01-18

## 2024-02-01 NOTE — PROGRESS NOTES
2/2/2024    Home visit medically necessary in lieu of an office visit due to:  [facility] resident, uses walker, difficult to get out.    HPI:  Patient says she is doing pretty good this month. Her knees, hands and feet have not been bothering her much. She is using BioFreeze. She takes Tylenol also which help. Her nose has not been running with Atrovent nasal spray. Her breathing has been good. She is not coughing very often on Mucinex DM. Her legs swell if she doesn't elevate them. She is not depressed on Lexapro. She is moving her bowels well. She has urinary incontinence and wears Depends. Her overactive bladder bothers her sometimes. She has not had any recent falls.     REVIEW OF SYSTEMS:  GENERAL: Appetite good.  No weight change, generally healthy, no change in strength or exercise tolerance. CARDIOVASCULAR: No chest pains, no murmurs, no palpitations, no syncope, no orthopnea.  LEGS HAVE BEEN SWELLING.RESPIRATORY: No shortness of breath, no pain with breathing, no wheezing, no hemoptysis, no cough. GASTROINTESTINAL: No change in appetite, no dysphagia, no heartburn, no abdominal pains, no diarrhea, no bowel habit changes, no emesis, no melena, no hemorrhoids. CONSTIPATION SOMETIMES. GENITOURINARY: No incontinence or retention, no urinary urgency, no frequent UTIs, no dysuria, no change in nature of urine. STRESS INCONTINENCE AT TIMES. MUSCULOSKELETAL: No swelling or redness of joints, no limitation of range of motion, no weakness or numbness.  HAS HAD JT PAINS BUT NOT NOW. NEURO/PSYCH: No weakness, no tremor, no seizures, no changes in mentation, no ataxia. No depressive symptoms, no changes in sleep habits, no changes in thought content.  MEMORY PROBLEMS. All other systems negative.    Allergies   Allergen Reactions    Flomax [Tamsulosin Hcl] Other (See Comments)     Causes chest pain    Ace Inhibitors Swelling     Causes lip swelling    Alendronate Sodium      Other reaction(s): Unknown    Flomax [Tamsulosin]

## 2024-02-02 ENCOUNTER — OFFICE VISIT (OUTPATIENT)
Dept: PRIMARY CARE CLINIC | Age: 89
End: 2024-02-02
Payer: COMMERCIAL

## 2024-02-02 VITALS
WEIGHT: 193 LBS | OXYGEN SATURATION: 100 % | RESPIRATION RATE: 18 BRPM | HEART RATE: 70 BPM | DIASTOLIC BLOOD PRESSURE: 56 MMHG | TEMPERATURE: 97 F | BODY MASS INDEX: 32.95 KG/M2 | HEIGHT: 64 IN | SYSTOLIC BLOOD PRESSURE: 131 MMHG

## 2024-02-02 DIAGNOSIS — M15.9 PRIMARY OSTEOARTHRITIS INVOLVING MULTIPLE JOINTS: ICD-10-CM

## 2024-02-02 DIAGNOSIS — R26.2 DIFFICULTY WALKING: ICD-10-CM

## 2024-02-02 DIAGNOSIS — I10 PRIMARY HYPERTENSION: Primary | ICD-10-CM

## 2024-02-02 DIAGNOSIS — E11.9 DIABETES MELLITUS TYPE 2, DIET-CONTROLLED (HCC): Chronic | ICD-10-CM

## 2024-02-02 DIAGNOSIS — N18.32 STAGE 3B CHRONIC KIDNEY DISEASE (HCC): ICD-10-CM

## 2024-02-02 PROCEDURE — 1036F TOBACCO NON-USER: CPT | Performed by: FAMILY MEDICINE

## 2024-02-02 PROCEDURE — 99349 HOME/RES VST EST MOD MDM 40: CPT | Performed by: FAMILY MEDICINE

## 2024-02-02 PROCEDURE — G8484 FLU IMMUNIZE NO ADMIN: HCPCS | Performed by: FAMILY MEDICINE

## 2024-02-02 PROCEDURE — 1123F ACP DISCUSS/DSCN MKR DOCD: CPT | Performed by: FAMILY MEDICINE

## 2024-02-02 PROCEDURE — G8417 CALC BMI ABV UP PARAM F/U: HCPCS | Performed by: FAMILY MEDICINE

## 2024-02-02 PROCEDURE — 1090F PRES/ABSN URINE INCON ASSESS: CPT | Performed by: FAMILY MEDICINE

## 2024-02-26 ENCOUNTER — OFFICE VISIT (OUTPATIENT)
Dept: CARDIOLOGY CLINIC | Age: 89
End: 2024-02-26
Payer: COMMERCIAL

## 2024-02-26 ENCOUNTER — NURSE ONLY (OUTPATIENT)
Dept: CARDIOLOGY CLINIC | Age: 89
End: 2024-02-26
Payer: COMMERCIAL

## 2024-02-26 VITALS
DIASTOLIC BLOOD PRESSURE: 60 MMHG | SYSTOLIC BLOOD PRESSURE: 120 MMHG | BODY MASS INDEX: 33.05 KG/M2 | WEIGHT: 193.6 LBS | HEART RATE: 60 BPM | HEIGHT: 64 IN | OXYGEN SATURATION: 94 %

## 2024-02-26 DIAGNOSIS — I10 ESSENTIAL HYPERTENSION: ICD-10-CM

## 2024-02-26 DIAGNOSIS — M15.9 PRIMARY OSTEOARTHRITIS INVOLVING MULTIPLE JOINTS: ICD-10-CM

## 2024-02-26 DIAGNOSIS — E04.9 NODULAR GOITER: ICD-10-CM

## 2024-02-26 DIAGNOSIS — N18.31 STAGE 3A CHRONIC KIDNEY DISEASE (HCC): ICD-10-CM

## 2024-02-26 DIAGNOSIS — E11.9 DIABETES MELLITUS TYPE 2, DIET-CONTROLLED (HCC): ICD-10-CM

## 2024-02-26 DIAGNOSIS — Z86.79 HISTORY OF COMPLETE HEART BLOCK: Primary | ICD-10-CM

## 2024-02-26 DIAGNOSIS — Z79.899 ON STATIN THERAPY: ICD-10-CM

## 2024-02-26 DIAGNOSIS — M50.30 DDD (DEGENERATIVE DISC DISEASE), CERVICAL: ICD-10-CM

## 2024-02-26 DIAGNOSIS — Z87.898 H/O SYNCOPE: ICD-10-CM

## 2024-02-26 DIAGNOSIS — I38 VHD (VALVULAR HEART DISEASE): ICD-10-CM

## 2024-02-26 DIAGNOSIS — Z91.81 H/O FALL: ICD-10-CM

## 2024-02-26 DIAGNOSIS — E03.9 HYPOTHYROIDISM, UNSPECIFIED TYPE: ICD-10-CM

## 2024-02-26 DIAGNOSIS — Z95.0 PACEMAKER: ICD-10-CM

## 2024-02-26 DIAGNOSIS — E66.09 NON MORBID OBESITY DUE TO EXCESS CALORIES: ICD-10-CM

## 2024-02-26 DIAGNOSIS — J45.20 MILD INTERMITTENT ASTHMA WITHOUT COMPLICATION: ICD-10-CM

## 2024-02-26 DIAGNOSIS — R29.898 WEAKNESS OF BOTH LOWER EXTREMITIES: ICD-10-CM

## 2024-02-26 DIAGNOSIS — R26.9 GAIT DIFFICULTY: ICD-10-CM

## 2024-02-26 DIAGNOSIS — Z86.39 H/O HYPERLIPIDEMIA: ICD-10-CM

## 2024-02-26 PROCEDURE — G8484 FLU IMMUNIZE NO ADMIN: HCPCS | Performed by: INTERNAL MEDICINE

## 2024-02-26 PROCEDURE — 1036F TOBACCO NON-USER: CPT | Performed by: INTERNAL MEDICINE

## 2024-02-26 PROCEDURE — 93000 ELECTROCARDIOGRAM COMPLETE: CPT | Performed by: INTERNAL MEDICINE

## 2024-02-26 PROCEDURE — 1090F PRES/ABSN URINE INCON ASSESS: CPT | Performed by: INTERNAL MEDICINE

## 2024-02-26 PROCEDURE — G8427 DOCREV CUR MEDS BY ELIG CLIN: HCPCS | Performed by: INTERNAL MEDICINE

## 2024-02-26 PROCEDURE — G8417 CALC BMI ABV UP PARAM F/U: HCPCS | Performed by: INTERNAL MEDICINE

## 2024-02-26 PROCEDURE — 1123F ACP DISCUSS/DSCN MKR DOCD: CPT | Performed by: INTERNAL MEDICINE

## 2024-02-26 PROCEDURE — 99213 OFFICE O/P EST LOW 20 MIN: CPT | Performed by: INTERNAL MEDICINE

## 2024-02-26 PROCEDURE — 93280 PM DEVICE PROGR EVAL DUAL: CPT | Performed by: INTERNAL MEDICINE

## 2024-02-26 RX ORDER — ACETAMINOPHEN 325 MG/1
325 TABLET ORAL 2 TIMES DAILY
COMMUNITY

## 2024-02-29 NOTE — PROGRESS NOTES
OFFICE VISIT        PRIMARY CARE PHYSICIAN:    Venkatesh Haile MD     ALLERGIES / SENSITIVITIES:    Allergies   Allergen Reactions    Flomax [Tamsulosin Hcl] Other (See Comments)     Causes chest pain    Ace Inhibitors Swelling     Causes lip swelling    Alendronate Sodium      Other reaction(s): Unknown    Flomax [Tamsulosin]     Alendronate Sodium Other (See Comments)     Pt unsure of allergies    Lasix [Furosemide] Other (See Comments)     weakness    Lisinopril Rash and Swelling    Penicillins Rash     Rash    Propolis Other (See Comments)    Vioxx [Rofecoxib] Other (See Comments)      REVIEWED MEDICATIONS:      Current Outpatient Medications:     acetaminophen (TYLENOL) 325 MG tablet, Take 1 tablet by mouth in the morning and at bedtime, Disp: , Rfl:     levothyroxine (SYNTHROID) 112 MCG tablet, Take 1 tablet by mouth daily, Disp: 31 tablet, Rfl: 11    Dextromethorphan-guaiFENesin (MUCINEX DM)  MG TB12, Take 1 tablet by mouth 2 times daily as needed (cough), Disp: 120 tablet, Rfl: 5    ipratropium (ATROVENT) 0.06 % nasal spray, 2 sprays by Each Nostril route 4 times daily as needed for Rhinitis, Disp: 1 each, Rfl: 5    folic acid (FOLVITE) 400 MCG tablet, TAKE 1 TABLET BY MOUTH IN  THE MORNING., Disp: 30 tablet, Rfl: 11    aspirin (ASPIR-LOW) 81 MG EC tablet, TAKE 1 TABLET BY MOUTH ONCE DAILY, Disp: 30 tablet, Rfl: 11    amLODIPine (NORVASC) 5 MG tablet, TAKE 1 TABLET BY MOUTH AT  BEDTIME., Disp: 30 tablet, Rfl: 11    loratadine (CLARITIN) 10 MG tablet, TAKE 1 TABLET BY MOUTH ONCE A DAY, Disp: 90 tablet, Rfl: 3    metoprolol succinate (TOPROL XL) 25 MG extended release tablet, TAKE 1 TABLET BY MOUTH AT  BEDTIME., Disp: 90 tablet, Rfl: 3    Omega-3 Fatty Acids (FISH OIL) 1200 MG CAPS, TAKE 1 CAPSULE BY MOUTH IN THE MORNING., Disp: 30 capsule, Rfl: 11    simvastatin (ZOCOR) 10 MG tablet, TAKE 1 TABLET BY MOUTH AT  BEDTIME., Disp: 30 tablet, Rfl: 11    spironolactone (ALDACTONE) 25 MG tablet, TAKE 1 TABLET BY

## 2024-03-13 LAB
ANION GAP SERPL CALCULATED.3IONS-SCNC: 13 MMOL/L (ref 7–16)
BUN SERPL-MCNC: 27 MG/DL (ref 6–23)
CALCIUM SERPL-MCNC: 8.9 MG/DL (ref 8.6–10.2)
CHLORIDE SERPL-SCNC: 96 MMOL/L (ref 98–107)
CO2 SERPL-SCNC: 20 MMOL/L (ref 22–29)
CREAT SERPL-MCNC: 1.1 MG/DL (ref 0.5–1)
GFR SERPL CREATININE-BSD FRML MDRD: 50 ML/MIN/1.73M2
GLUCOSE SERPL-MCNC: 78 MG/DL (ref 74–99)
HBA1C MFR BLD: 6 % (ref 4–5.6)
POTASSIUM SERPL-SCNC: 5.1 MMOL/L (ref 3.5–5)
SODIUM SERPL-SCNC: 129 MMOL/L (ref 132–146)

## 2024-04-11 ENCOUNTER — NURSE ONLY (OUTPATIENT)
Dept: NON INVASIVE DIAGNOSTICS | Age: 89
End: 2024-04-11

## 2024-04-11 ENCOUNTER — OFFICE VISIT (OUTPATIENT)
Dept: NON INVASIVE DIAGNOSTICS | Age: 89
End: 2024-04-11
Payer: COMMERCIAL

## 2024-04-11 VITALS
HEIGHT: 64 IN | RESPIRATION RATE: 16 BRPM | DIASTOLIC BLOOD PRESSURE: 70 MMHG | BODY MASS INDEX: 33.67 KG/M2 | OXYGEN SATURATION: 96 % | HEART RATE: 63 BPM | WEIGHT: 197.2 LBS | SYSTOLIC BLOOD PRESSURE: 124 MMHG

## 2024-04-11 DIAGNOSIS — Z95.0 PACEMAKER: Primary | Chronic | ICD-10-CM

## 2024-04-11 DIAGNOSIS — E78.5 HYPERLIPIDEMIA, UNSPECIFIED HYPERLIPIDEMIA TYPE: Primary | ICD-10-CM

## 2024-04-11 DIAGNOSIS — Z86.79 HISTORY OF COMPLETE HEART BLOCK: ICD-10-CM

## 2024-04-11 PROCEDURE — 99204 OFFICE O/P NEW MOD 45 MIN: CPT | Performed by: INTERNAL MEDICINE

## 2024-04-11 PROCEDURE — 93000 ELECTROCARDIOGRAM COMPLETE: CPT | Performed by: INTERNAL MEDICINE

## 2024-04-11 PROCEDURE — 1123F ACP DISCUSS/DSCN MKR DOCD: CPT | Performed by: INTERNAL MEDICINE

## 2024-04-11 PROCEDURE — 1090F PRES/ABSN URINE INCON ASSESS: CPT | Performed by: INTERNAL MEDICINE

## 2024-04-11 PROCEDURE — 1036F TOBACCO NON-USER: CPT | Performed by: INTERNAL MEDICINE

## 2024-04-11 PROCEDURE — G8427 DOCREV CUR MEDS BY ELIG CLIN: HCPCS | Performed by: INTERNAL MEDICINE

## 2024-04-11 PROCEDURE — G8417 CALC BMI ABV UP PARAM F/U: HCPCS | Performed by: INTERNAL MEDICINE

## 2024-04-11 NOTE — PROGRESS NOTES
[facility] PHYSICIANS- [facility] Elect[facility]iology  Consultation Report  PATIENT: Donnie Chow  MEDICAL RECORD NUMBER: [id number]  DATE OF SERVICE:  4/11/2024  ATTENDING ELECTROPHYSIOLOGIST: Fátima Barlow MD  REFERRING PHYSICIAN: Sanjuanita Andrade MD and Venkatesh Haile MD  CHIEF COMPLAINT:   Chief Complaint   Patient presents with    Pacemaker Problem     Gove Device Nearing...    Complete Heart Bblock    Hyperlipidemia         HPI: This is a 89 y.o. female with a history of hypertension, hyperlipidemia, chronic kidney disease, complete heart block and a permanent pacemaker implant in 2016 who presents to cardiac electrophysiology clinic for consultation since her pacemaker generator is reaching [facility].  The patient has been followed by Dr. Andrade annually.  The patient is pacemaker dependent and needs a generator change but has been refusing due to her age.  She is fairly active but has ambulated with a walker.  In addition she is mentally clear and is of the opinion that she is too old and does not want to undergo any surgeries.  No recent history of syncope or near syncope.  She denies any symptoms of chest pain at rest or on exertion.  No symptoms of paroxysmal nocturnal dyspnea, orthopnea or leg edema at the present time.  She has not been seen in the electrophysiology office for more than 5 years.    Patient Active Problem List    Diagnosis Date Noted    Chronic cough 03/16/2023     Priority: Medium    Primary hypertension 10/29/2013     Priority: Low    Diabetes mellitus type 2, diet-controlled (HCC) 10/29/2013     Priority: Low    Stage 3b chronic kidney disease (HCC) 05/11/2023    Seasonal allergic rhinitis 04/05/2022    Urinary incontinence 08/18/2021    Constipation 04/15/2021    Overactive bladder 08/27/2020    Chronic fatigue 12/10/2019    History of complete heart block 03/07/2019    Depression 03/07/2019    Difficulty walking 03/07/2019    Primary osteoarthritis involving

## 2024-04-27 NOTE — TELEPHONE ENCOUNTER
----- Message from Dru Dill MD sent at 4/26/2024  4:36 PM CDT -----  Urine culture no growth recommend patient to follow-up with primary care physician      Writer called pt to convey the above and noted per MD - no answer - writer left message for a return call   Fax from [phone]. Donnie was previously seeing Psych services. [address] [facility] for psych eval and treat?

## 2024-07-12 LAB
ALBUMIN SERPL-MCNC: 3.5 G/DL (ref 3.5–5.2)
ALP SERPL-CCNC: 93 U/L (ref 35–104)
ALT SERPL-CCNC: 15 U/L (ref 0–32)
ANION GAP SERPL CALCULATED.3IONS-SCNC: 12 MMOL/L (ref 7–16)
AST SERPL-CCNC: 20 U/L (ref 0–31)
BASOPHILS # BLD: 0.17 K/UL (ref 0–0.2)
BASOPHILS NFR BLD: 2 % (ref 0–2)
BILIRUB SERPL-MCNC: 0.3 MG/DL (ref 0–1.2)
BUN SERPL-MCNC: 23 MG/DL (ref 6–23)
CALCIUM SERPL-MCNC: 9.1 MG/DL (ref 8.6–10.2)
CHLORIDE SERPL-SCNC: 95 MMOL/L (ref 98–107)
CO2 SERPL-SCNC: 21 MMOL/L (ref 22–29)
CREAT SERPL-MCNC: 1 MG/DL (ref 0.5–1)
EOSINOPHIL # BLD: 0.6 K/UL (ref 0.05–0.5)
EOSINOPHILS RELATIVE PERCENT: 7 % (ref 0–6)
ERYTHROCYTE [DISTWIDTH] IN BLOOD BY AUTOMATED COUNT: 13.6 % (ref 11.5–15)
GFR, ESTIMATED: 53 ML/MIN/1.73M2
GLUCOSE SERPL-MCNC: 84 MG/DL (ref 74–99)
HCT VFR BLD AUTO: 35.4 % (ref 34–48)
HGB BLD-MCNC: 11.7 G/DL (ref 11.5–15.5)
IMM GRANULOCYTES # BLD AUTO: 0.03 K/UL (ref 0–0.58)
IMM GRANULOCYTES NFR BLD: 0 % (ref 0–5)
LYMPHOCYTES NFR BLD: 3.72 K/UL (ref 1.5–4)
LYMPHOCYTES RELATIVE PERCENT: 45 % (ref 20–42)
MCH RBC QN AUTO: 29.8 PG (ref 26–35)
MCHC RBC AUTO-ENTMCNC: 33.1 G/DL (ref 32–34.5)
MCV RBC AUTO: 90.3 FL (ref 80–99.9)
MONOCYTES NFR BLD: 0.91 K/UL (ref 0.1–0.95)
MONOCYTES NFR BLD: 11 % (ref 2–12)
NEUTROPHILS NFR BLD: 35 % (ref 43–80)
NEUTS SEG NFR BLD: 2.9 K/UL (ref 1.8–7.3)
PLATELET # BLD AUTO: 309 K/UL (ref 130–450)
PMV BLD AUTO: 10 FL (ref 7–12)
POTASSIUM SERPL-SCNC: 4.6 MMOL/L (ref 3.5–5)
PROT SERPL-MCNC: 7.7 G/DL (ref 6.4–8.3)
RBC # BLD AUTO: 3.92 M/UL (ref 3.5–5.5)
SODIUM SERPL-SCNC: 128 MMOL/L (ref 132–146)
WBC OTHER # BLD: 8.3 K/UL (ref 4.5–11.5)

## 2024-07-25 ENCOUNTER — TELEPHONE (OUTPATIENT)
Dept: NON INVASIVE DIAGNOSTICS | Age: 89
End: 2024-07-25

## 2024-07-25 LAB
ANION GAP SERPL CALCULATED.3IONS-SCNC: 11 MMOL/L (ref 7–16)
BUN SERPL-MCNC: 22 MG/DL (ref 6–23)
CALCIUM SERPL-MCNC: 9.7 MG/DL (ref 8.6–10.2)
CHLORIDE SERPL-SCNC: 90 MMOL/L (ref 98–107)
CO2 SERPL-SCNC: 25 MMOL/L (ref 22–29)
CREAT SERPL-MCNC: 1.1 MG/DL (ref 0.5–1)
GFR, ESTIMATED: 50 ML/MIN/1.73M2
GLUCOSE SERPL-MCNC: 81 MG/DL (ref 74–99)
POTASSIUM SERPL-SCNC: 4.6 MMOL/L (ref 3.5–5)
SODIUM SERPL-SCNC: 126 MMOL/L (ref 132–146)

## 2024-07-25 NOTE — TELEPHONE ENCOUNTER
Pt's niece called today to cancel Donnie's appointment. She states that Donnie is refusing further treatment and refuses to replace the battery in her pacemaker.     Dr. Barlow's note from 4/11/24 does document Donnie was against invasive procedures but would think about it and get back to us. Pt's niece states she called us before to notify us that Donnie is asking us to \"leave her alone\".     Cancelled today's appt per request of Donnie's niece.     Donnie is currently in assisted living/nursing home and apparently the niece (POA) had to sign documents about this at the nursing home ([facility])

## 2024-09-23 ENCOUNTER — TELEPHONE (OUTPATIENT)
Dept: ADMINISTRATIVE | Age: 89
End: 2024-09-23

## 2024-09-23 NOTE — TELEPHONE ENCOUNTER
Patient's nurse is calling regarding her pacemaker battery. It is currently yellow and since Donnie refused all treatment they want to know if they can just shut the monitor off or what they need to do with it? Just looking for some guidance on what to do.

## 2024-09-24 ENCOUNTER — TELEPHONE (OUTPATIENT)
Dept: NON INVASIVE DIAGNOSTICS | Age: 89
End: 2024-09-24

## 2024-09-24 ENCOUNTER — PREP FOR PROCEDURE (OUTPATIENT)
Dept: NON INVASIVE DIAGNOSTICS | Age: 89
End: 2024-09-24

## 2024-09-24 RX ORDER — SODIUM CHLORIDE 0.9 % (FLUSH) 0.9 %
5-40 SYRINGE (ML) INJECTION EVERY 12 HOURS SCHEDULED
Status: CANCELLED | OUTPATIENT
Start: 2024-10-02

## 2024-09-24 RX ORDER — SODIUM CHLORIDE 0.9 % (FLUSH) 0.9 %
5-40 SYRINGE (ML) INJECTION PRN
Status: CANCELLED | OUTPATIENT
Start: 2024-10-02

## 2024-09-24 RX ORDER — SODIUM CHLORIDE 9 MG/ML
INJECTION, SOLUTION INTRAVENOUS PRN
Status: CANCELLED | OUTPATIENT
Start: 2024-10-02

## 2024-10-02 ENCOUNTER — ANESTHESIA EVENT (OUTPATIENT)
Age: 89
End: 2024-10-02
Payer: COMMERCIAL

## 2024-10-02 ENCOUNTER — ANESTHESIA (OUTPATIENT)
Age: 89
End: 2024-10-02
Payer: COMMERCIAL

## 2024-10-02 ENCOUNTER — HOSPITAL ENCOUNTER (OUTPATIENT)
Age: 89
Setting detail: OUTPATIENT SURGERY
Discharge: NURSING, REHAB OR LONG-TERM CARE | End: 2024-10-02
Attending: INTERNAL MEDICINE | Admitting: INTERNAL MEDICINE
Payer: COMMERCIAL

## 2024-10-02 VITALS
HEART RATE: 72 BPM | DIASTOLIC BLOOD PRESSURE: 72 MMHG | SYSTOLIC BLOOD PRESSURE: 120 MMHG | RESPIRATION RATE: 16 BRPM | WEIGHT: 192 LBS | HEIGHT: 64 IN | OXYGEN SATURATION: 94 % | BODY MASS INDEX: 32.78 KG/M2 | TEMPERATURE: 96.5 F

## 2024-10-02 DIAGNOSIS — Z45.010 ENCOUNTER FOR PACEMAKER AT END OF BATTERY LIFE: ICD-10-CM

## 2024-10-02 PROBLEM — I44.2 CHB (COMPLETE HEART BLOCK) (HCC): Status: ACTIVE | Noted: 2024-10-02

## 2024-10-02 LAB
ALBUMIN SERPL-MCNC: 3.3 G/DL (ref 3.5–5.2)
ALP SERPL-CCNC: 109 U/L (ref 35–104)
ALT SERPL-CCNC: 13 U/L (ref 0–32)
ANION GAP SERPL CALCULATED.3IONS-SCNC: 11 MMOL/L (ref 7–16)
AST SERPL-CCNC: 18 U/L (ref 0–31)
BASOPHILS # BLD: 0.15 K/UL (ref 0–0.2)
BASOPHILS NFR BLD: 2 % (ref 0–2)
BILIRUB SERPL-MCNC: 0.4 MG/DL (ref 0–1.2)
BUN SERPL-MCNC: 30 MG/DL (ref 6–23)
CALCIUM SERPL-MCNC: 8.9 MG/DL (ref 8.6–10.2)
CHLORIDE SERPL-SCNC: 96 MMOL/L (ref 98–107)
CO2 SERPL-SCNC: 24 MMOL/L (ref 22–29)
CREAT SERPL-MCNC: 1.3 MG/DL (ref 0.5–1)
ECHO BSA: 1.98 M2
EOSINOPHIL # BLD: 0.23 K/UL (ref 0.05–0.5)
EOSINOPHILS RELATIVE PERCENT: 3 % (ref 0–6)
ERYTHROCYTE [DISTWIDTH] IN BLOOD BY AUTOMATED COUNT: 13.6 % (ref 11.5–15)
GFR, ESTIMATED: 39 ML/MIN/1.73M2
GLUCOSE SERPL-MCNC: 80 MG/DL (ref 74–99)
HCT VFR BLD AUTO: 37.4 % (ref 34–48)
HGB BLD-MCNC: 11.9 G/DL (ref 11.5–15.5)
IMM GRANULOCYTES # BLD AUTO: <0.03 K/UL (ref 0–0.58)
IMM GRANULOCYTES NFR BLD: 0 % (ref 0–5)
LYMPHOCYTES NFR BLD: 2.65 K/UL (ref 1.5–4)
LYMPHOCYTES RELATIVE PERCENT: 38 % (ref 20–42)
MAGNESIUM SERPL-MCNC: 1.9 MG/DL (ref 1.6–2.6)
MCH RBC QN AUTO: 29.6 PG (ref 26–35)
MCHC RBC AUTO-ENTMCNC: 31.8 G/DL (ref 32–34.5)
MCV RBC AUTO: 93 FL (ref 80–99.9)
MONOCYTES NFR BLD: 0.93 K/UL (ref 0.1–0.95)
MONOCYTES NFR BLD: 14 % (ref 2–12)
NEUTROPHILS NFR BLD: 43 % (ref 43–80)
NEUTS SEG NFR BLD: 2.93 K/UL (ref 1.8–7.3)
PLATELET # BLD AUTO: 337 K/UL (ref 130–450)
PMV BLD AUTO: 9.1 FL (ref 7–12)
POTASSIUM SERPL-SCNC: 5.1 MMOL/L (ref 3.5–5)
PROT SERPL-MCNC: 8.4 G/DL (ref 6.4–8.3)
RBC # BLD AUTO: 4.02 M/UL (ref 3.5–5.5)
SODIUM SERPL-SCNC: 131 MMOL/L (ref 132–146)
TSH SERPL DL<=0.05 MIU/L-ACNC: 6.73 UIU/ML (ref 0.27–4.2)
WBC OTHER # BLD: 6.9 K/UL (ref 4.5–11.5)

## 2024-10-02 PROCEDURE — 85025 COMPLETE CBC W/AUTO DIFF WBC: CPT

## 2024-10-02 PROCEDURE — 2709999900 HC NON-CHARGEABLE SUPPLY: Performed by: INTERNAL MEDICINE

## 2024-10-02 PROCEDURE — 7100000010 HC PHASE II RECOVERY - FIRST 15 MIN: Performed by: INTERNAL MEDICINE

## 2024-10-02 PROCEDURE — C1785 PMKR, DUAL, RATE-RESP: HCPCS | Performed by: INTERNAL MEDICINE

## 2024-10-02 PROCEDURE — 84443 ASSAY THYROID STIM HORMONE: CPT

## 2024-10-02 PROCEDURE — 2580000003 HC RX 258

## 2024-10-02 PROCEDURE — 2580000003 HC RX 258: Performed by: NURSE ANESTHETIST, CERTIFIED REGISTERED

## 2024-10-02 PROCEDURE — 7100000011 HC PHASE II RECOVERY - ADDTL 15 MIN: Performed by: INTERNAL MEDICINE

## 2024-10-02 PROCEDURE — 6360000002 HC RX W HCPCS: Performed by: NURSE ANESTHETIST, CERTIFIED REGISTERED

## 2024-10-02 PROCEDURE — 2720000010 HC SURG SUPPLY STERILE: Performed by: INTERNAL MEDICINE

## 2024-10-02 PROCEDURE — 3700000000 HC ANESTHESIA ATTENDED CARE: Performed by: INTERNAL MEDICINE

## 2024-10-02 PROCEDURE — 3700000001 HC ADD 15 MINUTES (ANESTHESIA): Performed by: INTERNAL MEDICINE

## 2024-10-02 PROCEDURE — 33228 REMV&REPLC PM GEN DUAL LEAD: CPT | Performed by: INTERNAL MEDICINE

## 2024-10-02 PROCEDURE — 83735 ASSAY OF MAGNESIUM: CPT

## 2024-10-02 PROCEDURE — C1894 INTRO/SHEATH, NON-LASER: HCPCS | Performed by: INTERNAL MEDICINE

## 2024-10-02 PROCEDURE — 80053 COMPREHEN METABOLIC PANEL: CPT

## 2024-10-02 PROCEDURE — C1889 IMPLANT/INSERT DEVICE, NOC: HCPCS | Performed by: INTERNAL MEDICINE

## 2024-10-02 DEVICE — DR PACEMAKER DDDR
Type: IMPLANTABLE DEVICE | Status: FUNCTIONAL
Brand: ASSURITY MRI™

## 2024-10-02 DEVICE — ENVELOPE CMRM6122 ABSORB MED MR
Type: IMPLANTABLE DEVICE | Status: FUNCTIONAL
Brand: TYRX™

## 2024-10-02 RX ORDER — VANCOMYCIN HYDROCHLORIDE 1 G/20ML
INJECTION, POWDER, LYOPHILIZED, FOR SOLUTION INTRAVENOUS
Status: DISCONTINUED | OUTPATIENT
Start: 2024-10-02 | End: 2024-10-02 | Stop reason: SDUPTHER

## 2024-10-02 RX ORDER — CALCIUM CARBONATE 500 MG/1
1 TABLET, CHEWABLE ORAL 2 TIMES DAILY PRN
COMMUNITY

## 2024-10-02 RX ORDER — HYOSCYAMINE SULFATE 0.125 MG
0.12 TABLET ORAL EVERY 4 HOURS PRN
COMMUNITY

## 2024-10-02 RX ORDER — SIMETHICONE 80 MG
125 TABLET,CHEWABLE ORAL EVERY 8 HOURS PRN
COMMUNITY

## 2024-10-02 RX ORDER — SODIUM CHLORIDE 0.9 % (FLUSH) 0.9 %
5-40 SYRINGE (ML) INJECTION EVERY 12 HOURS SCHEDULED
Status: DISCONTINUED | OUTPATIENT
Start: 2024-10-02 | End: 2024-10-02 | Stop reason: HOSPADM

## 2024-10-02 RX ORDER — FENTANYL CITRATE 50 UG/ML
INJECTION, SOLUTION INTRAMUSCULAR; INTRAVENOUS
Status: DISCONTINUED | OUTPATIENT
Start: 2024-10-02 | End: 2024-10-02 | Stop reason: SDUPTHER

## 2024-10-02 RX ORDER — MORPHINE SULFATE 10 MG/5ML
20 SOLUTION ORAL EVERY 4 HOURS PRN
COMMUNITY

## 2024-10-02 RX ORDER — FAMOTIDINE 20 MG/1
20 TABLET, FILM COATED ORAL 2 TIMES DAILY
COMMUNITY

## 2024-10-02 RX ORDER — SODIUM CHLORIDE 0.9 % (FLUSH) 0.9 %
5-40 SYRINGE (ML) INJECTION PRN
Status: DISCONTINUED | OUTPATIENT
Start: 2024-10-02 | End: 2024-10-02 | Stop reason: HOSPADM

## 2024-10-02 RX ORDER — PROPOFOL 10 MG/ML
INJECTION, EMULSION INTRAVENOUS
Status: DISCONTINUED | OUTPATIENT
Start: 2024-10-02 | End: 2024-10-02 | Stop reason: SDUPTHER

## 2024-10-02 RX ORDER — SODIUM CHLORIDE 9 MG/ML
INJECTION, SOLUTION INTRAVENOUS
Status: DISCONTINUED | OUTPATIENT
Start: 2024-10-02 | End: 2024-10-02 | Stop reason: SDUPTHER

## 2024-10-02 RX ORDER — LORAZEPAM 0.5 MG/1
0.5 TABLET ORAL EVERY 4 HOURS PRN
COMMUNITY

## 2024-10-02 RX ORDER — DOXYCYCLINE HYCLATE 100 MG
100 TABLET ORAL 2 TIMES DAILY
Qty: 14 TABLET | Refills: 0 | Status: SHIPPED | OUTPATIENT
Start: 2024-10-02 | End: 2024-10-09

## 2024-10-02 RX ORDER — SODIUM CHLORIDE 9 MG/ML
INJECTION, SOLUTION INTRAVENOUS PRN
Status: DISCONTINUED | OUTPATIENT
Start: 2024-10-02 | End: 2024-10-02 | Stop reason: HOSPADM

## 2024-10-02 RX ADMIN — SODIUM CHLORIDE: 9 INJECTION, SOLUTION INTRAVENOUS at 12:45

## 2024-10-02 RX ADMIN — SODIUM CHLORIDE: 9 INJECTION, SOLUTION INTRAVENOUS at 12:50

## 2024-10-02 RX ADMIN — PROPOFOL 20 MCG/KG/MIN: 10 INJECTION, EMULSION INTRAVENOUS at 12:55

## 2024-10-02 RX ADMIN — VANCOMYCIN HYDROCHLORIDE 1000 MG: 1 INJECTION, POWDER, LYOPHILIZED, FOR SOLUTION INTRAVENOUS at 12:50

## 2024-10-02 RX ADMIN — PROPOFOL 20 MG: 10 INJECTION, EMULSION INTRAVENOUS at 12:59

## 2024-10-02 RX ADMIN — FENTANYL CITRATE 50 MCG: 50 INJECTION, SOLUTION INTRAMUSCULAR; INTRAVENOUS at 13:00

## 2024-10-02 NOTE — PROGRESS NOTES
Discharge instructions provided to patient and niece, both verbalized understanding. IV's removed, right femoral site and right chest without bleeding or hematomas. VSS. Patient discharged back to facility in stable condition.

## 2024-10-02 NOTE — ANESTHESIA PRE PROCEDURE
Department of Anesthesiology  Preprocedure Note       Name:  Donnie Chow   Age:  89 y.o.  :  1933                                          MRN:  [id number]         Date:  10/2/2024      Surgeon: Surgeon(s):  Fátima Barlow MD    Procedure: Procedure(s):  Remove & replace PPM gen dual lead, TVP PLACEMENT AND REMOVAL    Medications prior to admission:   Prior to Admission medications    Medication Sig Start Date End Date Taking? Authorizing Provider   acetaminophen (TYLENOL) 325 MG tablet Take 1 tablet by mouth in the morning and at bedtime    Provider, MD Zelda   levothyroxine (SYNTHROID) 112 MCG tablet Take 1 tablet by mouth daily 24   Venkatesh Haile MD   Dextromethorphan-guaiFENesin (MUCINEX DM)  MG TB12 Take 1 tablet by mouth 2 times daily as needed (cough) 10/2/23   Venkatesh Haile MD   ipratropium (ATROVENT) 0.06 % nasal spray 2 sprays by Each Nostril route 4 times daily as needed for Rhinitis 23   Venkatesh Haile MD   folic acid (FOLVITE) 400 MCG tablet TAKE 1 TABLET BY MOUTH IN  THE MORNING. 7/3/23   Venkatesh Haile MD   aspirin (ASPIR-LOW) 81 MG EC tablet TAKE 1 TABLET BY MOUTH ONCE DAILY 7/3/23   Venkatesh Haile MD   amLODIPine (NORVASC) 5 MG tablet TAKE 1 TABLET BY MOUTH AT  BEDTIME. 7/3/23   Venkatesh Haile MD   loratadine (CLARITIN) 10 MG tablet TAKE 1 TABLET BY MOUTH ONCE A DAY 7/3/23   Venkatesh Haile MD   metoprolol succinate (TOPROL XL) 25 MG extended release tablet TAKE 1 TABLET BY MOUTH AT  BEDTIME. 7/3/23   Venkatesh Haile MD   Omega-3 Fatty Acids (FISH OIL) 1200 MG CAPS TAKE 1 CAPSULE BY MOUTH IN THE MORNING. 7/3/23   Venkatesh Haile MD   simvastatin (ZOCOR) 10 MG tablet TAKE 1 TABLET BY MOUTH AT  BEDTIME. 7/3/23   Venkatesh Haile MD   spironolactone (ALDACTONE) 25 MG tablet TAKE 1 TABLET BY MOUTH DAILY 7/3/23   Venkatesh Haile MD   Calcium-Vitamin D 600-5 MG-MCG TABS Take 1 tablet by mouth daily 7/3/23   Venkatesh Haile MD   polyethylene glycol (GLYCOLAX) 17 GM/SCOOP powder Take 17 g by mouth daily as needed

## 2024-10-02 NOTE — H&P
delivery off the appropriate therapy and to verify function of the device.    I have independently reviewed all of the ECGs and rhythm strips per above     Assessment/Plan:    1.  Dual-chamber pacemaker in situ--DOI 2016, [employer] Accolade  Appropriate function but generator has reached [place]  The patient has Abbott leads with a [employer] pacemaker generator.    2.  Complete heart block-led to the pacemaker implant in 2016  Patient is currently pacemaker dependent    3.  Hypertension  Hypertensive heart disease with left ventricular hypertrophy on echocardiography  Currently well-controlled on metoprolol , amlodipine , Aldactone    4.  Coronary artery disease--risk factors  Negative stress tests so far.    5.  CKD--stage IIIA    6.  Diabetes, hyperlipidemia    Recommendations:    Pacemaker generator change today.  We shall use an Abbott generator since this would match her leads and make her system MRI compatible.  This was explained to the patient's daughter    Risks, benefits, and alternatives of pacemaker generator replacement were discussed in detail today. These risks include but are not limited to bleeding,infection, lead damage or discovery of a non-functional lead which would require lead revision and risks of blood clot, pneumothorax, hemothorax, cardiac perforation and tamponade, lead dislodgement, contrast induced nephropathy leading to short or even long term dialysis, vascular injury requiring emergent surgical repair, stroke and even death. The patient understands these risks and agrees to proceed today.  Thank you for allowing me to participate in your patient's care.  Please call me if there are any questions or concerns.      Fátima Barlow MD, [facility]  Cardiac Electrophysiology  [facility]  [facility] [place]: [facility]

## 2024-10-03 NOTE — ANESTHESIA POSTPROCEDURE EVALUATION
Department of Anesthesiology  Postprocedure Note    Patient: Donnie Chow  MRN: [id number]  YOB: 1933  Date of evaluation: 10/3/2024    Procedure Summary       Date: 10/02/24 [place] / [place] [facility] [place] [facility] [place]    Anesthesia Start: 1242 Anesthesia Stop: 1357    Procedure: Remove & replace PPM gen dual lead Diagnosis:       Pacemaker at end of battery life      (Pacemaker generator at end of battery life  Complete heart block)    Providers: Fátima Barlow MD Responsible Provider: Jorge Hart DO    Anesthesia Type: MAC ASA Status: 4            Anesthesia Type: No value filed.    Yakelin Phase I: Yakelin Score: 10    Yakelin Phase II:      Anesthesia Post Evaluation    Patient location during evaluation: bedside  Patient participation: complete - patient cannot participate  Level of consciousness: awake and alert  Airway patency: patent  Nausea & Vomiting: no nausea and no vomiting  Cardiovascular status: blood pressure returned to baseline  Respiratory status: acceptable  Hydration status: euvolemic  Multimodal analgesia pain management approach    There were no known notable events for this encounter.

## 2024-10-16 ENCOUNTER — NURSE ONLY (OUTPATIENT)
Dept: NON INVASIVE DIAGNOSTICS | Age: 89
End: 2024-10-16

## 2024-10-16 DIAGNOSIS — Z95.0 PACEMAKER: Primary | ICD-10-CM

## 2024-10-16 DIAGNOSIS — Z86.79 HISTORY OF COMPLETE HEART BLOCK: ICD-10-CM

## (undated) DEVICE — PLASMABLADE PS210-030S 3.0S LOCK: Brand: PLASMABLADE™

## (undated) DEVICE — WRENCH TORQ NO2

## (undated) DEVICE — PAD, DEFIB, ADULT, RADIOTRAN, PHYSIO, LO: Brand: MEDLINE

## (undated) DEVICE — INTRODUCER SHTH 6FR L12CM DIA0.038IN HEMSTAS CLOSE TOL

## (undated) DEVICE — CABLE PACE L12FT ALGTR CLP DISP FOR PACE SYS ANALZR MERLIN

## (undated) DEVICE — CATHETER PACE 5FR L110CM 6FR INTRO D10CM 1MM SPACE POLYUR

## (undated) DEVICE — Device

## (undated) DEVICE — DISPOSABLE ADAPTER
Type: IMPLANTABLE DEVICE | Status: NON-FUNCTIONAL
Removed: 2024-10-02